# Patient Record
Sex: MALE | Race: WHITE | Employment: FULL TIME | URBAN - METROPOLITAN AREA
[De-identification: names, ages, dates, MRNs, and addresses within clinical notes are randomized per-mention and may not be internally consistent; named-entity substitution may affect disease eponyms.]

---

## 2018-01-01 ENCOUNTER — HOSPITAL ENCOUNTER (OUTPATIENT)
Dept: GENERAL RADIOLOGY | Age: 48
Discharge: HOME | End: 2018-05-14
Payer: COMMERCIAL

## 2018-01-01 DIAGNOSIS — R05.9 COUGH: ICD-10-CM

## 2018-01-01 PROCEDURE — 71046 X-RAY EXAM CHEST 2 VIEWS: CPT

## 2018-02-26 ENCOUNTER — HOSPITAL ENCOUNTER (OUTPATIENT)
Dept: CT IMAGING | Age: 48
Discharge: HOME | End: 2018-02-26
Attending: INTERNAL MEDICINE
Payer: COMMERCIAL

## 2018-02-26 DIAGNOSIS — R13.19 CONSTANT LOW-GRADE DYSPHAGIA: ICD-10-CM

## 2018-02-26 DIAGNOSIS — C16.0 ADENOCARCINOMA OF GASTROESOPHAGEAL JUNCTION (HCC): ICD-10-CM

## 2018-02-26 DIAGNOSIS — R63.4 LOSS OF WEIGHT: ICD-10-CM

## 2018-02-26 LAB — CREAT BLD-MCNC: 1.8 MG/DL (ref 0.6–1.3)

## 2018-02-26 PROCEDURE — 71260 CT THORAX DX C+: CPT

## 2018-02-26 PROCEDURE — 74177 CT ABD & PELVIS W/CONTRAST: CPT

## 2018-02-26 PROCEDURE — 82565 ASSAY OF CREATININE: CPT

## 2018-02-26 PROCEDURE — 74011636320 HC RX REV CODE- 636/320: Performed by: RADIOLOGY

## 2018-02-26 RX ADMIN — IOPAMIDOL 80 ML: 755 INJECTION, SOLUTION INTRAVENOUS at 16:28

## 2019-01-01 ENCOUNTER — HOSPITAL ENCOUNTER (INPATIENT)
Age: 49
LOS: 17 days | Discharge: HOSPICE | DRG: 329 | End: 2019-04-29
Attending: SPECIALIST | Admitting: INTERNAL MEDICINE
Payer: COMMERCIAL

## 2019-01-01 ENCOUNTER — HOME CARE VISIT (OUTPATIENT)
Dept: HOSPICE | Facility: HOSPICE | Age: 49
End: 2019-01-01
Payer: COMMERCIAL

## 2019-01-01 ENCOUNTER — ANESTHESIA (OUTPATIENT)
Dept: SURGERY | Age: 49
DRG: 329 | End: 2019-01-01
Payer: COMMERCIAL

## 2019-01-01 ENCOUNTER — APPOINTMENT (OUTPATIENT)
Dept: GENERAL RADIOLOGY | Age: 49
DRG: 329 | End: 2019-01-01
Attending: SURGERY
Payer: COMMERCIAL

## 2019-01-01 ENCOUNTER — ANESTHESIA EVENT (OUTPATIENT)
Dept: ENDOSCOPY | Age: 49
DRG: 329 | End: 2019-01-01
Payer: COMMERCIAL

## 2019-01-01 ENCOUNTER — APPOINTMENT (OUTPATIENT)
Dept: CT IMAGING | Age: 49
DRG: 329 | End: 2019-01-01
Attending: INTERNAL MEDICINE
Payer: COMMERCIAL

## 2019-01-01 ENCOUNTER — HOSPICE ADMISSION (OUTPATIENT)
Dept: HOSPICE | Facility: HOSPICE | Age: 49
End: 2019-01-01
Payer: COMMERCIAL

## 2019-01-01 ENCOUNTER — APPOINTMENT (OUTPATIENT)
Dept: GENERAL RADIOLOGY | Age: 49
DRG: 329 | End: 2019-01-01
Attending: INTERNAL MEDICINE
Payer: COMMERCIAL

## 2019-01-01 ENCOUNTER — ANESTHESIA (OUTPATIENT)
Dept: ENDOSCOPY | Age: 49
DRG: 329 | End: 2019-01-01
Payer: COMMERCIAL

## 2019-01-01 ENCOUNTER — HOSPITAL ENCOUNTER (INPATIENT)
Age: 49
LOS: 2 days | DRG: 951 | End: 2019-05-01
Attending: FAMILY MEDICINE | Admitting: FAMILY MEDICINE
Payer: OTHER MISCELLANEOUS

## 2019-01-01 ENCOUNTER — ANESTHESIA EVENT (OUTPATIENT)
Dept: SURGERY | Age: 49
DRG: 329 | End: 2019-01-01
Payer: COMMERCIAL

## 2019-01-01 ENCOUNTER — APPOINTMENT (OUTPATIENT)
Dept: ULTRASOUND IMAGING | Age: 49
DRG: 329 | End: 2019-01-01
Attending: INTERNAL MEDICINE
Payer: COMMERCIAL

## 2019-01-01 ENCOUNTER — APPOINTMENT (OUTPATIENT)
Dept: INTERVENTIONAL RADIOLOGY/VASCULAR | Age: 49
DRG: 329 | End: 2019-01-01
Attending: INTERNAL MEDICINE
Payer: COMMERCIAL

## 2019-01-01 VITALS
BODY MASS INDEX: 21.33 KG/M2 | SYSTOLIC BLOOD PRESSURE: 93 MMHG | RESPIRATION RATE: 18 BRPM | DIASTOLIC BLOOD PRESSURE: 63 MMHG | HEART RATE: 94 BPM | WEIGHT: 144 LBS | OXYGEN SATURATION: 98 % | HEIGHT: 69 IN | TEMPERATURE: 97.4 F

## 2019-01-01 VITALS
RESPIRATION RATE: 20 BRPM | OXYGEN SATURATION: 99 % | HEART RATE: 114 BPM | SYSTOLIC BLOOD PRESSURE: 144 MMHG | DIASTOLIC BLOOD PRESSURE: 91 MMHG | TEMPERATURE: 97.7 F

## 2019-01-01 DIAGNOSIS — R45.1 RESTLESSNESS AND AGITATION: ICD-10-CM

## 2019-01-01 DIAGNOSIS — Z71.89 ADVANCED CARE PLANNING/COUNSELING DISCUSSION: ICD-10-CM

## 2019-01-01 DIAGNOSIS — C18.9 ADENOCARCINOMA OF COLON (HCC): ICD-10-CM

## 2019-01-01 DIAGNOSIS — C80.0 CARCINOMATOSIS (HCC): ICD-10-CM

## 2019-01-01 DIAGNOSIS — R10.84 GENERALIZED ABDOMINAL PAIN: ICD-10-CM

## 2019-01-01 DIAGNOSIS — Z71.89 DNR (DO NOT RESUSCITATE) DISCUSSION: ICD-10-CM

## 2019-01-01 DIAGNOSIS — R06.02 SHORTNESS OF BREATH: ICD-10-CM

## 2019-01-01 DIAGNOSIS — K62.89 RECTAL OR ANAL PAIN: ICD-10-CM

## 2019-01-01 DIAGNOSIS — R06.02 SOB (SHORTNESS OF BREATH): ICD-10-CM

## 2019-01-01 DIAGNOSIS — G89.3 CANCER ASSOCIATED PAIN: ICD-10-CM

## 2019-01-01 DIAGNOSIS — Z71.89 GOALS OF CARE, COUNSELING/DISCUSSION: ICD-10-CM

## 2019-01-01 LAB
ALBUMIN SERPL-MCNC: 1.9 G/DL (ref 3.5–5)
ALBUMIN SERPL-MCNC: 2.3 G/DL (ref 3.5–5)
ALBUMIN SERPL-MCNC: 2.5 G/DL (ref 3.5–5)
ALBUMIN SERPL-MCNC: 2.8 G/DL (ref 3.5–5)
ALBUMIN/GLOB SERPL: 0.7 {RATIO} (ref 1.1–2.2)
ALBUMIN/GLOB SERPL: 0.8 {RATIO} (ref 1.1–2.2)
ALBUMIN/GLOB SERPL: 0.8 {RATIO} (ref 1.1–2.2)
ALP SERPL-CCNC: 56 U/L (ref 45–117)
ALP SERPL-CCNC: 69 U/L (ref 45–117)
ALP SERPL-CCNC: 75 U/L (ref 45–117)
ALT SERPL-CCNC: 12 U/L (ref 12–78)
ALT SERPL-CCNC: 8 U/L (ref 12–78)
ALT SERPL-CCNC: 8 U/L (ref 12–78)
ANION GAP SERPL CALC-SCNC: 12 MMOL/L (ref 5–15)
ANION GAP SERPL CALC-SCNC: 13 MMOL/L (ref 5–15)
ANION GAP SERPL CALC-SCNC: 6 MMOL/L (ref 5–15)
ANION GAP SERPL CALC-SCNC: 7 MMOL/L (ref 5–15)
ANION GAP SERPL CALC-SCNC: 8 MMOL/L (ref 5–15)
APPEARANCE FLD: ABNORMAL
APPEARANCE UR: ABNORMAL
AST SERPL-CCNC: 12 U/L (ref 15–37)
AST SERPL-CCNC: 13 U/L (ref 15–37)
AST SERPL-CCNC: 15 U/L (ref 15–37)
ATRIAL RATE: 124 BPM
BACTERIA SPEC CULT: NORMAL
BACTERIA URNS QL MICRO: ABNORMAL /HPF
BASOPHILS # BLD: 0 K/UL (ref 0–0.1)
BASOPHILS NFR BLD: 0 % (ref 0–1)
BILIRUB SERPL-MCNC: 0.3 MG/DL (ref 0.2–1)
BILIRUB SERPL-MCNC: 0.3 MG/DL (ref 0.2–1)
BILIRUB SERPL-MCNC: 0.4 MG/DL (ref 0.2–1)
BILIRUB UR QL CFM: NEGATIVE
BODY FLD TYPE: NORMAL
BUN SERPL-MCNC: 26 MG/DL (ref 6–20)
BUN SERPL-MCNC: 30 MG/DL (ref 6–20)
BUN SERPL-MCNC: 37 MG/DL (ref 6–20)
BUN SERPL-MCNC: 38 MG/DL (ref 6–20)
BUN SERPL-MCNC: 43 MG/DL (ref 6–20)
BUN SERPL-MCNC: 48 MG/DL (ref 6–20)
BUN SERPL-MCNC: 49 MG/DL (ref 6–20)
BUN SERPL-MCNC: 49 MG/DL (ref 6–20)
BUN SERPL-MCNC: 59 MG/DL (ref 6–20)
BUN/CREAT SERPL: 12 (ref 12–20)
BUN/CREAT SERPL: 13 (ref 12–20)
BUN/CREAT SERPL: 14 (ref 12–20)
BUN/CREAT SERPL: 15 (ref 12–20)
BUN/CREAT SERPL: 15 (ref 12–20)
BUN/CREAT SERPL: 18 (ref 12–20)
BUN/CREAT SERPL: 18 (ref 12–20)
BUN/CREAT SERPL: 19 (ref 12–20)
BUN/CREAT SERPL: 20 (ref 12–20)
BUN/CREAT SERPL: 20 (ref 12–20)
BUN/CREAT SERPL: 21 (ref 12–20)
BUN/CREAT SERPL: 24 (ref 12–20)
BUN/CREAT SERPL: 25 (ref 12–20)
BUN/CREAT SERPL: 26 (ref 12–20)
CALCIUM SERPL-MCNC: 8 MG/DL (ref 8.5–10.1)
CALCIUM SERPL-MCNC: 8.3 MG/DL (ref 8.5–10.1)
CALCIUM SERPL-MCNC: 8.4 MG/DL (ref 8.5–10.1)
CALCIUM SERPL-MCNC: 8.5 MG/DL (ref 8.5–10.1)
CALCIUM SERPL-MCNC: 8.7 MG/DL (ref 8.5–10.1)
CALCIUM SERPL-MCNC: 8.8 MG/DL (ref 8.5–10.1)
CALCIUM SERPL-MCNC: 8.9 MG/DL (ref 8.5–10.1)
CALCIUM SERPL-MCNC: 9 MG/DL (ref 8.5–10.1)
CALCIUM SERPL-MCNC: 9 MG/DL (ref 8.5–10.1)
CALCIUM SERPL-MCNC: 9.1 MG/DL (ref 8.5–10.1)
CALCIUM SERPL-MCNC: 9.5 MG/DL (ref 8.5–10.1)
CALCIUM SERPL-MCNC: 9.9 MG/DL (ref 8.5–10.1)
CALCULATED P AXIS, ECG09: 23 DEGREES
CALCULATED R AXIS, ECG10: 12 DEGREES
CALCULATED T AXIS, ECG11: 96 DEGREES
CHLORIDE SERPL-SCNC: 106 MMOL/L (ref 97–108)
CHLORIDE SERPL-SCNC: 109 MMOL/L (ref 97–108)
CHLORIDE SERPL-SCNC: 110 MMOL/L (ref 97–108)
CHLORIDE SERPL-SCNC: 111 MMOL/L (ref 97–108)
CHLORIDE SERPL-SCNC: 112 MMOL/L (ref 97–108)
CHLORIDE SERPL-SCNC: 113 MMOL/L (ref 97–108)
CHLORIDE SERPL-SCNC: 114 MMOL/L (ref 97–108)
CHLORIDE SERPL-SCNC: 115 MMOL/L (ref 97–108)
CO2 SERPL-SCNC: 19 MMOL/L (ref 21–32)
CO2 SERPL-SCNC: 20 MMOL/L (ref 21–32)
CO2 SERPL-SCNC: 22 MMOL/L (ref 21–32)
CO2 SERPL-SCNC: 23 MMOL/L (ref 21–32)
CO2 SERPL-SCNC: 25 MMOL/L (ref 21–32)
CO2 SERPL-SCNC: 27 MMOL/L (ref 21–32)
COLOR FLD: YELLOW
COLOR UR: ABNORMAL
CREAT SERPL-MCNC: 1.84 MG/DL (ref 0.7–1.3)
CREAT SERPL-MCNC: 1.91 MG/DL (ref 0.7–1.3)
CREAT SERPL-MCNC: 1.97 MG/DL (ref 0.7–1.3)
CREAT SERPL-MCNC: 1.97 MG/DL (ref 0.7–1.3)
CREAT SERPL-MCNC: 2.01 MG/DL (ref 0.7–1.3)
CREAT SERPL-MCNC: 2.09 MG/DL (ref 0.7–1.3)
CREAT SERPL-MCNC: 2.11 MG/DL (ref 0.7–1.3)
CREAT SERPL-MCNC: 2.31 MG/DL (ref 0.7–1.3)
CREAT SERPL-MCNC: 2.33 MG/DL (ref 0.7–1.3)
CREAT SERPL-MCNC: 2.38 MG/DL (ref 0.7–1.3)
CREAT SERPL-MCNC: 2.44 MG/DL (ref 0.7–1.3)
CREAT SERPL-MCNC: 2.44 MG/DL (ref 0.7–1.3)
CREAT SERPL-MCNC: 2.46 MG/DL (ref 0.7–1.3)
CREAT SERPL-MCNC: 2.58 MG/DL (ref 0.7–1.3)
DIAGNOSIS, 93000: NORMAL
DIFFERENTIAL METHOD BLD: ABNORMAL
EOSINOPHIL # BLD: 0 K/UL (ref 0–0.4)
EOSINOPHIL # BLD: 0.1 K/UL (ref 0–0.4)
EOSINOPHIL # BLD: 0.1 K/UL (ref 0–0.4)
EOSINOPHIL NFR BLD: 0 % (ref 0–7)
EOSINOPHIL NFR BLD: 1 % (ref 0–7)
EOSINOPHIL NFR BLD: 1 % (ref 0–7)
EPITH CASTS URNS QL MICRO: ABNORMAL /LPF
ERYTHROCYTE [DISTWIDTH] IN BLOOD BY AUTOMATED COUNT: 16.3 % (ref 11.5–14.5)
ERYTHROCYTE [DISTWIDTH] IN BLOOD BY AUTOMATED COUNT: 16.3 % (ref 11.5–14.5)
ERYTHROCYTE [DISTWIDTH] IN BLOOD BY AUTOMATED COUNT: 16.5 % (ref 11.5–14.5)
ERYTHROCYTE [DISTWIDTH] IN BLOOD BY AUTOMATED COUNT: 16.6 % (ref 11.5–14.5)
ERYTHROCYTE [DISTWIDTH] IN BLOOD BY AUTOMATED COUNT: 16.7 % (ref 11.5–14.5)
EST. AVERAGE GLUCOSE BLD GHB EST-MCNC: 140 MG/DL
GLOBULIN SER CALC-MCNC: 2.9 G/DL (ref 2–4)
GLOBULIN SER CALC-MCNC: 3.1 G/DL (ref 2–4)
GLOBULIN SER CALC-MCNC: 3.3 G/DL (ref 2–4)
GLUCOSE BLD STRIP.AUTO-MCNC: 130 MG/DL (ref 65–100)
GLUCOSE BLD STRIP.AUTO-MCNC: 133 MG/DL (ref 65–100)
GLUCOSE BLD STRIP.AUTO-MCNC: 150 MG/DL (ref 65–100)
GLUCOSE FLD-MCNC: 61 MG/DL
GLUCOSE SERPL-MCNC: 108 MG/DL (ref 65–100)
GLUCOSE SERPL-MCNC: 116 MG/DL (ref 65–100)
GLUCOSE SERPL-MCNC: 138 MG/DL (ref 65–100)
GLUCOSE SERPL-MCNC: 141 MG/DL (ref 65–100)
GLUCOSE SERPL-MCNC: 141 MG/DL (ref 65–100)
GLUCOSE SERPL-MCNC: 145 MG/DL (ref 65–100)
GLUCOSE SERPL-MCNC: 150 MG/DL (ref 65–100)
GLUCOSE SERPL-MCNC: 151 MG/DL (ref 65–100)
GLUCOSE SERPL-MCNC: 152 MG/DL (ref 65–100)
GLUCOSE SERPL-MCNC: 166 MG/DL (ref 65–100)
GLUCOSE SERPL-MCNC: 175 MG/DL (ref 65–100)
GLUCOSE SERPL-MCNC: 177 MG/DL (ref 65–100)
GLUCOSE SERPL-MCNC: 180 MG/DL (ref 65–100)
GLUCOSE SERPL-MCNC: 187 MG/DL (ref 65–100)
GLUCOSE UR STRIP.AUTO-MCNC: NEGATIVE MG/DL
GRAM STN SPEC: NORMAL
GRAM STN SPEC: NORMAL
HBA1C MFR BLD: 6.5 % (ref 4.2–6.3)
HCT VFR BLD AUTO: 29.4 % (ref 36.6–50.3)
HCT VFR BLD AUTO: 31.1 % (ref 36.6–50.3)
HCT VFR BLD AUTO: 32.7 % (ref 36.6–50.3)
HCT VFR BLD AUTO: 32.9 % (ref 36.6–50.3)
HCT VFR BLD AUTO: 33.5 % (ref 36.6–50.3)
HCT VFR BLD AUTO: 33.9 % (ref 36.6–50.3)
HCT VFR BLD AUTO: 33.9 % (ref 36.6–50.3)
HCT VFR BLD AUTO: 34.3 % (ref 36.6–50.3)
HCT VFR BLD AUTO: 34.4 % (ref 36.6–50.3)
HCT VFR BLD AUTO: 34.8 % (ref 36.6–50.3)
HCT VFR BLD AUTO: 35.8 % (ref 36.6–50.3)
HCT VFR BLD AUTO: 37.6 % (ref 36.6–50.3)
HCT VFR BLD AUTO: 39.7 % (ref 36.6–50.3)
HGB BLD-MCNC: 10.2 G/DL (ref 12.1–17)
HGB BLD-MCNC: 10.3 G/DL (ref 12.1–17)
HGB BLD-MCNC: 10.9 G/DL (ref 12.1–17)
HGB BLD-MCNC: 10.9 G/DL (ref 12.1–17)
HGB BLD-MCNC: 11.1 G/DL (ref 12.1–17)
HGB BLD-MCNC: 11.3 G/DL (ref 12.1–17)
HGB BLD-MCNC: 11.3 G/DL (ref 12.1–17)
HGB BLD-MCNC: 11.6 G/DL (ref 12.1–17)
HGB BLD-MCNC: 11.6 G/DL (ref 12.1–17)
HGB BLD-MCNC: 12.6 G/DL (ref 12.1–17)
HGB BLD-MCNC: 12.6 G/DL (ref 12.1–17)
HGB BLD-MCNC: 9.3 G/DL (ref 12.1–17)
HGB BLD-MCNC: 9.9 G/DL (ref 12.1–17)
HGB UR QL STRIP: ABNORMAL
HYALINE CASTS URNS QL MICRO: ABNORMAL /LPF (ref 0–5)
IMM GRANULOCYTES # BLD AUTO: 0 K/UL
IMM GRANULOCYTES # BLD AUTO: 0 K/UL (ref 0–0.04)
IMM GRANULOCYTES # BLD AUTO: 0.1 K/UL (ref 0–0.04)
IMM GRANULOCYTES # BLD AUTO: 0.1 K/UL (ref 0–0.04)
IMM GRANULOCYTES # BLD AUTO: 0.4 K/UL (ref 0–0.04)
IMM GRANULOCYTES NFR BLD AUTO: 0 %
IMM GRANULOCYTES NFR BLD AUTO: 0 % (ref 0–0.5)
IMM GRANULOCYTES NFR BLD AUTO: 1 % (ref 0–0.5)
IMM GRANULOCYTES NFR BLD AUTO: 1 % (ref 0–0.5)
IMM GRANULOCYTES NFR BLD AUTO: 2 % (ref 0–0.5)
KETONES UR QL STRIP.AUTO: 15 MG/DL
LDH FLD L TO P-CCNC: 2209 U/L
LEUKOCYTE ESTERASE UR QL STRIP.AUTO: ABNORMAL
LYMPHOCYTES # BLD: 0.2 K/UL (ref 0.8–3.5)
LYMPHOCYTES # BLD: 0.3 K/UL (ref 0.8–3.5)
LYMPHOCYTES # BLD: 0.3 K/UL (ref 0.8–3.5)
LYMPHOCYTES # BLD: 0.4 K/UL (ref 0.8–3.5)
LYMPHOCYTES # BLD: 0.6 K/UL (ref 0.8–3.5)
LYMPHOCYTES NFR BLD: 2 % (ref 12–49)
LYMPHOCYTES NFR BLD: 3 % (ref 12–49)
LYMPHOCYTES NFR BLD: 3 % (ref 12–49)
LYMPHOCYTES NFR BLD: 4 % (ref 12–49)
LYMPHOCYTES NFR BLD: 4 % (ref 12–49)
LYMPHOCYTES NFR FLD: 6 %
MAGNESIUM SERPL-MCNC: 1.7 MG/DL (ref 1.6–2.4)
MAGNESIUM SERPL-MCNC: 1.8 MG/DL (ref 1.6–2.4)
MCH RBC QN AUTO: 28.6 PG (ref 26–34)
MCH RBC QN AUTO: 28.9 PG (ref 26–34)
MCH RBC QN AUTO: 29.1 PG (ref 26–34)
MCH RBC QN AUTO: 29.2 PG (ref 26–34)
MCH RBC QN AUTO: 29.4 PG (ref 26–34)
MCH RBC QN AUTO: 29.4 PG (ref 26–34)
MCH RBC QN AUTO: 29.5 PG (ref 26–34)
MCH RBC QN AUTO: 29.5 PG (ref 26–34)
MCH RBC QN AUTO: 29.6 PG (ref 26–34)
MCH RBC QN AUTO: 29.8 PG (ref 26–34)
MCH RBC QN AUTO: 30 PG (ref 26–34)
MCHC RBC AUTO-ENTMCNC: 31.2 G/DL (ref 30–36.5)
MCHC RBC AUTO-ENTMCNC: 31.3 G/DL (ref 30–36.5)
MCHC RBC AUTO-ENTMCNC: 31.6 G/DL (ref 30–36.5)
MCHC RBC AUTO-ENTMCNC: 31.7 G/DL (ref 30–36.5)
MCHC RBC AUTO-ENTMCNC: 31.7 G/DL (ref 30–36.5)
MCHC RBC AUTO-ENTMCNC: 31.8 G/DL (ref 30–36.5)
MCHC RBC AUTO-ENTMCNC: 31.8 G/DL (ref 30–36.5)
MCHC RBC AUTO-ENTMCNC: 32.4 G/DL (ref 30–36.5)
MCHC RBC AUTO-ENTMCNC: 33.1 G/DL (ref 30–36.5)
MCHC RBC AUTO-ENTMCNC: 33.3 G/DL (ref 30–36.5)
MCHC RBC AUTO-ENTMCNC: 33.5 G/DL (ref 30–36.5)
MCV RBC AUTO: 88.3 FL (ref 80–99)
MCV RBC AUTO: 88.5 FL (ref 80–99)
MCV RBC AUTO: 88.5 FL (ref 80–99)
MCV RBC AUTO: 89.5 FL (ref 80–99)
MCV RBC AUTO: 89.9 FL (ref 80–99)
MCV RBC AUTO: 90.2 FL (ref 80–99)
MCV RBC AUTO: 90.5 FL (ref 80–99)
MCV RBC AUTO: 91.7 FL (ref 80–99)
MCV RBC AUTO: 91.7 FL (ref 80–99)
MCV RBC AUTO: 92 FL (ref 80–99)
MCV RBC AUTO: 92.4 FL (ref 80–99)
MCV RBC AUTO: 93 FL (ref 80–99)
MCV RBC AUTO: 94.5 FL (ref 80–99)
MESOTHL CELL NFR FLD: 34 %
MONOCYTES # BLD: 0.5 K/UL (ref 0–1)
MONOCYTES # BLD: 0.8 K/UL (ref 0–1)
MONOCYTES # BLD: 0.9 K/UL (ref 0–1)
MONOCYTES # BLD: 1 K/UL (ref 0–1)
MONOCYTES # BLD: 1.5 K/UL (ref 0–1)
MONOCYTES NFR BLD: 10 % (ref 5–13)
MONOCYTES NFR BLD: 10 % (ref 5–13)
MONOCYTES NFR BLD: 6 % (ref 5–13)
MONOCYTES NFR BLD: 7 % (ref 5–13)
MONOCYTES NFR BLD: 9 % (ref 5–13)
MONOS+MACROS NFR FLD: 44 %
NEUTROPHILS NFR FLD: 12 %
NEUTS BAND NFR BLD MANUAL: 2 % (ref 0–6)
NEUTS SEG # BLD: 10.1 K/UL (ref 1.8–8)
NEUTS SEG # BLD: 18.4 K/UL (ref 1.8–8)
NEUTS SEG # BLD: 6.9 K/UL (ref 1.8–8)
NEUTS SEG # BLD: 7.4 K/UL (ref 1.8–8)
NEUTS SEG # BLD: 8 K/UL (ref 1.8–8)
NEUTS SEG NFR BLD: 84 % (ref 32–75)
NEUTS SEG NFR BLD: 84 % (ref 32–75)
NEUTS SEG NFR BLD: 88 % (ref 32–75)
NEUTS SEG NFR BLD: 88 % (ref 32–75)
NEUTS SEG NFR BLD: 90 % (ref 32–75)
NITRITE UR QL STRIP.AUTO: POSITIVE
NRBC # BLD: 0 K/UL (ref 0–0.01)
NRBC BLD-RTO: 0 PER 100 WBC
NUC CELL # FLD: 2001 /CU MM
OTHER CELL,FOTHC: 4 %
PATH REV BLD -IMP: ABNORMAL
PH FLD: 7.2 [PH]
PH UR STRIP: 5.5 [PH] (ref 5–8)
PHOSPHATE SERPL-MCNC: 2.2 MG/DL (ref 2.6–4.7)
PHOSPHATE SERPL-MCNC: 2.5 MG/DL (ref 2.6–4.7)
PHOSPHATE SERPL-MCNC: 2.6 MG/DL (ref 2.6–4.7)
PHOSPHATE SERPL-MCNC: 3 MG/DL (ref 2.6–4.7)
PHOSPHATE SERPL-MCNC: 3.2 MG/DL (ref 2.6–4.7)
PLATELET # BLD AUTO: 182 K/UL (ref 150–400)
PLATELET # BLD AUTO: 189 K/UL (ref 150–400)
PLATELET # BLD AUTO: 193 K/UL (ref 150–400)
PLATELET # BLD AUTO: 212 K/UL (ref 150–400)
PLATELET # BLD AUTO: 224 K/UL (ref 150–400)
PLATELET # BLD AUTO: 227 K/UL (ref 150–400)
PLATELET # BLD AUTO: 231 K/UL (ref 150–400)
PLATELET # BLD AUTO: 233 K/UL (ref 150–400)
PLATELET # BLD AUTO: 243 K/UL (ref 150–400)
PLATELET # BLD AUTO: 273 K/UL (ref 150–400)
PLATELET # BLD AUTO: 274 K/UL (ref 150–400)
PLATELET # BLD AUTO: 302 K/UL (ref 150–400)
PLATELET # BLD AUTO: 317 K/UL (ref 150–400)
PMV BLD AUTO: 10 FL (ref 8.9–12.9)
PMV BLD AUTO: 10.1 FL (ref 8.9–12.9)
PMV BLD AUTO: 8.8 FL (ref 8.9–12.9)
PMV BLD AUTO: 8.9 FL (ref 8.9–12.9)
PMV BLD AUTO: 9 FL (ref 8.9–12.9)
PMV BLD AUTO: 9.1 FL (ref 8.9–12.9)
PMV BLD AUTO: 9.2 FL (ref 8.9–12.9)
PMV BLD AUTO: 9.3 FL (ref 8.9–12.9)
PMV BLD AUTO: 9.5 FL (ref 8.9–12.9)
PMV BLD AUTO: 9.5 FL (ref 8.9–12.9)
PMV BLD AUTO: 9.6 FL (ref 8.9–12.9)
PMV BLD AUTO: 9.7 FL (ref 8.9–12.9)
PMV BLD AUTO: 9.7 FL (ref 8.9–12.9)
POTASSIUM SERPL-SCNC: 2.8 MMOL/L (ref 3.5–5.1)
POTASSIUM SERPL-SCNC: 3.5 MMOL/L (ref 3.5–5.1)
POTASSIUM SERPL-SCNC: 3.8 MMOL/L (ref 3.5–5.1)
POTASSIUM SERPL-SCNC: 4 MMOL/L (ref 3.5–5.1)
POTASSIUM SERPL-SCNC: 4.1 MMOL/L (ref 3.5–5.1)
POTASSIUM SERPL-SCNC: 4.1 MMOL/L (ref 3.5–5.1)
POTASSIUM SERPL-SCNC: 4.2 MMOL/L (ref 3.5–5.1)
POTASSIUM SERPL-SCNC: 4.2 MMOL/L (ref 3.5–5.1)
POTASSIUM SERPL-SCNC: 4.3 MMOL/L (ref 3.5–5.1)
POTASSIUM SERPL-SCNC: 4.4 MMOL/L (ref 3.5–5.1)
POTASSIUM SERPL-SCNC: 4.7 MMOL/L (ref 3.5–5.1)
POTASSIUM SERPL-SCNC: 5.2 MMOL/L (ref 3.5–5.1)
POTASSIUM SERPL-SCNC: 5.3 MMOL/L (ref 3.5–5.1)
POTASSIUM SERPL-SCNC: 5.4 MMOL/L (ref 3.5–5.1)
PROT FLD-MCNC: 4.5 G/DL
PROT SERPL-MCNC: 4.8 G/DL (ref 6.4–8.2)
PROT SERPL-MCNC: 5.6 G/DL (ref 6.4–8.2)
PROT SERPL-MCNC: 6.1 G/DL (ref 6.4–8.2)
PROT UR STRIP-MCNC: 300 MG/DL
Q-T INTERVAL, ECG07: 306 MS
QRS DURATION, ECG06: 74 MS
QTC CALCULATION (BEZET), ECG08: 439 MS
RBC # BLD AUTO: 3.16 M/UL (ref 4.1–5.7)
RBC # BLD AUTO: 3.46 M/UL (ref 4.1–5.7)
RBC # BLD AUTO: 3.46 M/UL (ref 4.1–5.7)
RBC # BLD AUTO: 3.56 M/UL (ref 4.1–5.7)
RBC # BLD AUTO: 3.7 M/UL (ref 4.1–5.7)
RBC # BLD AUTO: 3.74 M/UL (ref 4.1–5.7)
RBC # BLD AUTO: 3.74 M/UL (ref 4.1–5.7)
RBC # BLD AUTO: 3.83 M/UL (ref 4.1–5.7)
RBC # BLD AUTO: 3.84 M/UL (ref 4.1–5.7)
RBC # BLD AUTO: 3.89 M/UL (ref 4.1–5.7)
RBC # BLD AUTO: 3.97 M/UL (ref 4.1–5.7)
RBC # BLD AUTO: 4.25 M/UL (ref 4.1–5.7)
RBC # BLD AUTO: 4.33 M/UL (ref 4.1–5.7)
RBC # FLD: >100 /CU MM
RBC #/AREA URNS HPF: >100 /HPF (ref 0–5)
RBC MORPH BLD: ABNORMAL
RBC MORPH BLD: ABNORMAL
SERVICE CMNT-IMP: ABNORMAL
SERVICE CMNT-IMP: NORMAL
SODIUM SERPL-SCNC: 137 MMOL/L (ref 136–145)
SODIUM SERPL-SCNC: 138 MMOL/L (ref 136–145)
SODIUM SERPL-SCNC: 138 MMOL/L (ref 136–145)
SODIUM SERPL-SCNC: 139 MMOL/L (ref 136–145)
SODIUM SERPL-SCNC: 139 MMOL/L (ref 136–145)
SODIUM SERPL-SCNC: 140 MMOL/L (ref 136–145)
SODIUM SERPL-SCNC: 140 MMOL/L (ref 136–145)
SODIUM SERPL-SCNC: 141 MMOL/L (ref 136–145)
SODIUM SERPL-SCNC: 142 MMOL/L (ref 136–145)
SODIUM SERPL-SCNC: 143 MMOL/L (ref 136–145)
SODIUM SERPL-SCNC: 144 MMOL/L (ref 136–145)
SODIUM SERPL-SCNC: 146 MMOL/L (ref 136–145)
SP GR UR REFRACTOMETRY: 1.02 (ref 1–1.03)
SPECIMEN SOURCE FLD: ABNORMAL
SPECIMEN SOURCE FLD: NORMAL
UR CULT HOLD, URHOLD: NORMAL
UROBILINOGEN UR QL STRIP.AUTO: 1 EU/DL (ref 0.2–1)
VENTRICULAR RATE, ECG03: 124 BPM
WBC # BLD AUTO: 10.8 K/UL (ref 4.1–11.1)
WBC # BLD AUTO: 10.9 K/UL (ref 4.1–11.1)
WBC # BLD AUTO: 11.5 K/UL (ref 4.1–11.1)
WBC # BLD AUTO: 11.6 K/UL (ref 4.1–11.1)
WBC # BLD AUTO: 12.8 K/UL (ref 4.1–11.1)
WBC # BLD AUTO: 20.9 K/UL (ref 4.1–11.1)
WBC # BLD AUTO: 7.6 K/UL (ref 4.1–11.1)
WBC # BLD AUTO: 7.9 K/UL (ref 4.1–11.1)
WBC # BLD AUTO: 8 K/UL (ref 4.1–11.1)
WBC # BLD AUTO: 8.2 K/UL (ref 4.1–11.1)
WBC # BLD AUTO: 8.7 K/UL (ref 4.1–11.1)
WBC # BLD AUTO: 8.8 K/UL (ref 4.1–11.1)
WBC # BLD AUTO: 8.8 K/UL (ref 4.1–11.1)
WBC URNS QL MICRO: ABNORMAL /HPF (ref 0–4)

## 2019-01-01 PROCEDURE — 65270000029 HC RM PRIVATE

## 2019-01-01 PROCEDURE — 74011636320 HC RX REV CODE- 636/320

## 2019-01-01 PROCEDURE — 94760 N-INVAS EAR/PLS OXIMETRY 1: CPT

## 2019-01-01 PROCEDURE — 74011000250 HC RX REV CODE- 250: Performed by: INTERNAL MEDICINE

## 2019-01-01 PROCEDURE — 74011250637 HC RX REV CODE- 250/637: Performed by: SURGERY

## 2019-01-01 PROCEDURE — 85027 COMPLETE CBC AUTOMATED: CPT

## 2019-01-01 PROCEDURE — 74011250636 HC RX REV CODE- 250/636: Performed by: FAMILY MEDICINE

## 2019-01-01 PROCEDURE — G0299 HHS/HOSPICE OF RN EA 15 MIN: HCPCS

## 2019-01-01 PROCEDURE — 88108 CYTOPATH CONCENTRATE TECH: CPT

## 2019-01-01 PROCEDURE — 85025 COMPLETE CBC W/AUTO DIFF WBC: CPT

## 2019-01-01 PROCEDURE — 77030026438 HC STYL ET INTUB CARD -A: Performed by: ANESTHESIOLOGY

## 2019-01-01 PROCEDURE — 99152 MOD SED SAME PHYS/QHP 5/>YRS: CPT

## 2019-01-01 PROCEDURE — 94640 AIRWAY INHALATION TREATMENT: CPT

## 2019-01-01 PROCEDURE — 76770 US EXAM ABDO BACK WALL COMP: CPT

## 2019-01-01 PROCEDURE — 77030036554

## 2019-01-01 PROCEDURE — 84157 ASSAY OF PROTEIN OTHER: CPT

## 2019-01-01 PROCEDURE — C1713 ANCHOR/SCREW BN/BN,TIS/BN: HCPCS

## 2019-01-01 PROCEDURE — 83735 ASSAY OF MAGNESIUM: CPT

## 2019-01-01 PROCEDURE — 87015 SPECIMEN INFECT AGNT CONCNTJ: CPT

## 2019-01-01 PROCEDURE — 76942 ECHO GUIDE FOR BIOPSY: CPT

## 2019-01-01 PROCEDURE — 74011000258 HC RX REV CODE- 258: Performed by: INTERNAL MEDICINE

## 2019-01-01 PROCEDURE — 74011250636 HC RX REV CODE- 250/636: Performed by: INTERNAL MEDICINE

## 2019-01-01 PROCEDURE — 80069 RENAL FUNCTION PANEL: CPT

## 2019-01-01 PROCEDURE — 77030020782 HC GWN BAIR PAWS FLX 3M -B

## 2019-01-01 PROCEDURE — 77030018719 HC DRSG PTCH ANTIMIC J&J -A

## 2019-01-01 PROCEDURE — 80053 COMPREHEN METABOLIC PANEL: CPT

## 2019-01-01 PROCEDURE — 36415 COLL VENOUS BLD VENIPUNCTURE: CPT

## 2019-01-01 PROCEDURE — 77030011640 HC PAD GRND REM COVD -A: Performed by: SURGERY

## 2019-01-01 PROCEDURE — 74011250637 HC RX REV CODE- 250/637: Performed by: FAMILY MEDICINE

## 2019-01-01 PROCEDURE — 77030034850: Performed by: SURGERY

## 2019-01-01 PROCEDURE — 77030008603 HC TRCR ENDOSC EPATH J&J -C: Performed by: SURGERY

## 2019-01-01 PROCEDURE — 77030010522

## 2019-01-01 PROCEDURE — 0656 HSPC GENERAL INPATIENT

## 2019-01-01 PROCEDURE — 74011250636 HC RX REV CODE- 250/636: Performed by: SURGERY

## 2019-01-01 PROCEDURE — 74011250637 HC RX REV CODE- 250/637: Performed by: INTERNAL MEDICINE

## 2019-01-01 PROCEDURE — P9045 ALBUMIN (HUMAN), 5%, 250 ML: HCPCS

## 2019-01-01 PROCEDURE — 80048 BASIC METABOLIC PNL TOTAL CA: CPT

## 2019-01-01 PROCEDURE — 94664 DEMO&/EVAL PT USE INHALER: CPT

## 2019-01-01 PROCEDURE — 77030011641 HC PASTE OST ADH BMS -A

## 2019-01-01 PROCEDURE — 77030010541

## 2019-01-01 PROCEDURE — 88305 TISSUE EXAM BY PATHOLOGIST: CPT

## 2019-01-01 PROCEDURE — 0DP6XUZ REMOVAL OF FEEDING DEVICE FROM STOMACH, EXTERNAL APPROACH: ICD-10-PCS | Performed by: SPECIALIST

## 2019-01-01 PROCEDURE — 83986 ASSAY PH BODY FLUID NOS: CPT

## 2019-01-01 PROCEDURE — 77030002966 HC SUT PDS J&J -A: Performed by: SURGERY

## 2019-01-01 PROCEDURE — 74011000250 HC RX REV CODE- 250: Performed by: SURGERY

## 2019-01-01 PROCEDURE — 76060000031 HC ANESTHESIA FIRST 0.5 HR: Performed by: SPECIALIST

## 2019-01-01 PROCEDURE — 89050 BODY FLUID CELL COUNT: CPT

## 2019-01-01 PROCEDURE — 84100 ASSAY OF PHOSPHORUS: CPT

## 2019-01-01 PROCEDURE — 77002 NEEDLE LOCALIZATION BY XRAY: CPT

## 2019-01-01 PROCEDURE — 74011250636 HC RX REV CODE- 250/636

## 2019-01-01 PROCEDURE — 88341 IMHCHEM/IMCYTCHM EA ADD ANTB: CPT

## 2019-01-01 PROCEDURE — 71045 X-RAY EXAM CHEST 1 VIEW: CPT

## 2019-01-01 PROCEDURE — 74011000250 HC RX REV CODE- 250: Performed by: FAMILY MEDICINE

## 2019-01-01 PROCEDURE — 77030003029 HC SUT VCRL J&J -B: Performed by: SURGERY

## 2019-01-01 PROCEDURE — 0DH63UZ INSERTION OF FEEDING DEVICE INTO STOMACH, PERCUTANEOUS APPROACH: ICD-10-PCS | Performed by: SPECIALIST

## 2019-01-01 PROCEDURE — 77030009403 HC ELECTRD ENDO MEGA -B: Performed by: SURGERY

## 2019-01-01 PROCEDURE — 74011000258 HC RX REV CODE- 258: Performed by: SURGERY

## 2019-01-01 PROCEDURE — 77030029684 HC NEB SM VOL KT MONA -A

## 2019-01-01 PROCEDURE — 0DBQ8ZX EXCISION OF ANUS, VIA NATURAL OR ARTIFICIAL OPENING ENDOSCOPIC, DIAGNOSTIC: ICD-10-PCS | Performed by: SPECIALIST

## 2019-01-01 PROCEDURE — 87205 SMEAR GRAM STAIN: CPT

## 2019-01-01 PROCEDURE — C9290 INJ, BUPIVACAINE LIPOSOME: HCPCS | Performed by: SURGERY

## 2019-01-01 PROCEDURE — 76040000007: Performed by: INTERNAL MEDICINE

## 2019-01-01 PROCEDURE — 71250 CT THORAX DX C-: CPT

## 2019-01-01 PROCEDURE — 74250 X-RAY XM SM INT 1CNTRST STD: CPT

## 2019-01-01 PROCEDURE — 77030032490 HC SLV COMPR SCD KNE COVD -B

## 2019-01-01 PROCEDURE — 74018 RADEX ABDOMEN 1 VIEW: CPT

## 2019-01-01 PROCEDURE — 74011250636 HC RX REV CODE- 250/636: Performed by: ANESTHESIOLOGY

## 2019-01-01 PROCEDURE — 77030009426 HC FCPS BIOP ENDOSC BSC -B: Performed by: SPECIALIST

## 2019-01-01 PROCEDURE — 81001 URINALYSIS AUTO W/SCOPE: CPT

## 2019-01-01 PROCEDURE — 82962 GLUCOSE BLOOD TEST: CPT

## 2019-01-01 PROCEDURE — 77030018870 HC TY PARCNT BD -B: Performed by: INTERNAL MEDICINE

## 2019-01-01 PROCEDURE — 88112 CYTOPATH CELL ENHANCE TECH: CPT

## 2019-01-01 PROCEDURE — 88309 TISSUE EXAM BY PATHOLOGIST: CPT

## 2019-01-01 PROCEDURE — 0W993ZZ DRAINAGE OF RIGHT PLEURAL CAVITY, PERCUTANEOUS APPROACH: ICD-10-PCS | Performed by: INTERNAL MEDICINE

## 2019-01-01 PROCEDURE — 77030003560 HC NDL HUBR BARD -A

## 2019-01-01 PROCEDURE — 77010033678 HC OXYGEN DAILY

## 2019-01-01 PROCEDURE — 74011000250 HC RX REV CODE- 250

## 2019-01-01 PROCEDURE — 88342 IMHCHEM/IMCYTCHM 1ST ANTB: CPT

## 2019-01-01 PROCEDURE — 76210000016 HC OR PH I REC 1 TO 1.5 HR: Performed by: SURGERY

## 2019-01-01 PROCEDURE — C1729 CATH, DRAINAGE: HCPCS

## 2019-01-01 PROCEDURE — 74011250636 HC RX REV CODE- 250/636: Performed by: SPECIALIST

## 2019-01-01 PROCEDURE — 76060000034 HC ANESTHESIA 1.5 TO 2 HR: Performed by: SURGERY

## 2019-01-01 PROCEDURE — 93005 ELECTROCARDIOGRAM TRACING: CPT

## 2019-01-01 PROCEDURE — 76040000019: Performed by: SPECIALIST

## 2019-01-01 PROCEDURE — C1894 INTRO/SHEATH, NON-LASER: HCPCS

## 2019-01-01 PROCEDURE — 77030008602 HC TRCR ENDOSC EPATH J&J -B: Performed by: SURGERY

## 2019-01-01 PROCEDURE — 77030002933 HC SUT MCRYL J&J -A: Performed by: SURGERY

## 2019-01-01 PROCEDURE — 83615 LACTATE (LD) (LDH) ENZYME: CPT

## 2019-01-01 PROCEDURE — 76010000153 HC OR TIME 1.5 TO 2 HR: Performed by: SURGERY

## 2019-01-01 PROCEDURE — 3336500001 HSPC ELECTION

## 2019-01-01 PROCEDURE — 76450000000

## 2019-01-01 PROCEDURE — 77030018809 HC RETRCTR ALXSO DISP AMR -B: Performed by: SURGERY

## 2019-01-01 PROCEDURE — 77030005537 HC CATH URETH BARD -A: Performed by: SURGERY

## 2019-01-01 PROCEDURE — 77030039266 HC ADH SKN EXOFIN S2SG -A: Performed by: SURGERY

## 2019-01-01 PROCEDURE — 77030020263 HC SOL INJ SOD CL0.9% LFCR 1000ML: Performed by: SURGERY

## 2019-01-01 PROCEDURE — 77030002986 HC SUT PROL J&J -A

## 2019-01-01 PROCEDURE — 77030015927 HC DEV TISS SEAL SURX -F: Performed by: SURGERY

## 2019-01-01 PROCEDURE — 0D1B0Z4 BYPASS ILEUM TO CUTANEOUS, OPEN APPROACH: ICD-10-PCS | Performed by: SURGERY

## 2019-01-01 PROCEDURE — 83036 HEMOGLOBIN GLYCOSYLATED A1C: CPT

## 2019-01-01 PROCEDURE — 77030039266 HC ADH SKN EXOFIN S2SG -A

## 2019-01-01 PROCEDURE — 74011636320 HC RX REV CODE- 636/320: Performed by: INTERNAL MEDICINE

## 2019-01-01 PROCEDURE — 82945 GLUCOSE OTHER FLUID: CPT

## 2019-01-01 PROCEDURE — 77030010541: Performed by: SURGERY

## 2019-01-01 PROCEDURE — 77030020747 HC TU INSUF ENDOSC TELE -A: Performed by: SURGERY

## 2019-01-01 PROCEDURE — 88313 SPECIAL STAINS GROUP 2: CPT

## 2019-01-01 PROCEDURE — 74011250636 HC RX REV CODE- 250/636: Performed by: RADIOLOGY

## 2019-01-01 RX ORDER — ACETAMINOPHEN 325 MG/1
650 TABLET ORAL
Status: DISCONTINUED | OUTPATIENT
Start: 2019-01-01 | End: 2019-01-01

## 2019-01-01 RX ORDER — PROCHLORPERAZINE EDISYLATE 5 MG/ML
5 INJECTION INTRAMUSCULAR; INTRAVENOUS
Status: DISCONTINUED | OUTPATIENT
Start: 2019-01-01 | End: 2019-01-01 | Stop reason: HOSPADM

## 2019-01-01 RX ORDER — SODIUM CHLORIDE, SODIUM LACTATE, POTASSIUM CHLORIDE, CALCIUM CHLORIDE 600; 310; 30; 20 MG/100ML; MG/100ML; MG/100ML; MG/100ML
125 INJECTION, SOLUTION INTRAVENOUS CONTINUOUS
Status: DISCONTINUED | OUTPATIENT
Start: 2019-01-01 | End: 2019-01-01 | Stop reason: HOSPADM

## 2019-01-01 RX ORDER — ONDANSETRON 2 MG/ML
4 INJECTION INTRAMUSCULAR; INTRAVENOUS AS NEEDED
Status: DISCONTINUED | OUTPATIENT
Start: 2019-01-01 | End: 2019-01-01 | Stop reason: HOSPADM

## 2019-01-01 RX ORDER — SODIUM CHLORIDE 9 MG/ML
50 INJECTION, SOLUTION INTRAVENOUS CONTINUOUS
Status: DISPENSED | OUTPATIENT
Start: 2019-01-01 | End: 2019-01-01

## 2019-01-01 RX ORDER — LORAZEPAM 2 MG/ML
1 INJECTION INTRAMUSCULAR EVERY 4 HOURS
Status: DISCONTINUED | OUTPATIENT
Start: 2019-01-01 | End: 2019-01-01

## 2019-01-01 RX ORDER — GLYCOPYRROLATE 0.2 MG/ML
INJECTION INTRAMUSCULAR; INTRAVENOUS AS NEEDED
Status: DISCONTINUED | OUTPATIENT
Start: 2019-01-01 | End: 2019-01-01 | Stop reason: HOSPADM

## 2019-01-01 RX ORDER — HALOPERIDOL 5 MG/ML
1 INJECTION INTRAMUSCULAR
Status: DISCONTINUED | OUTPATIENT
Start: 2019-01-01 | End: 2019-01-01

## 2019-01-01 RX ORDER — LORAZEPAM 2 MG/ML
1 INJECTION INTRAMUSCULAR
Status: DISCONTINUED | OUTPATIENT
Start: 2019-01-01 | End: 2019-01-01 | Stop reason: HOSPADM

## 2019-01-01 RX ORDER — LIDOCAINE HYDROCHLORIDE 10 MG/ML
10-30 INJECTION INFILTRATION; PERINEURAL
Status: DISCONTINUED | OUTPATIENT
Start: 2019-01-01 | End: 2019-01-01 | Stop reason: HOSPADM

## 2019-01-01 RX ORDER — LIDOCAINE HYDROCHLORIDE 10 MG/ML
0.1 INJECTION, SOLUTION EPIDURAL; INFILTRATION; INTRACAUDAL; PERINEURAL AS NEEDED
Status: DISCONTINUED | OUTPATIENT
Start: 2019-01-01 | End: 2019-01-01 | Stop reason: HOSPADM

## 2019-01-01 RX ORDER — ALBUMIN HUMAN 50 G/1000ML
SOLUTION INTRAVENOUS AS NEEDED
Status: DISCONTINUED | OUTPATIENT
Start: 2019-01-01 | End: 2019-01-01 | Stop reason: HOSPADM

## 2019-01-01 RX ORDER — SODIUM CHLORIDE 9 MG/ML
100 INJECTION, SOLUTION INTRAVENOUS CONTINUOUS
Status: DISCONTINUED | OUTPATIENT
Start: 2019-01-01 | End: 2019-01-01

## 2019-01-01 RX ORDER — POLYETHYLENE GLYCOL 3350 17 G/17G
17 POWDER, FOR SOLUTION ORAL DAILY
Status: DISCONTINUED | OUTPATIENT
Start: 2019-01-01 | End: 2019-01-01

## 2019-01-01 RX ORDER — LEVALBUTEROL INHALATION SOLUTION 1.25 MG/3ML
1.25 SOLUTION RESPIRATORY (INHALATION)
Status: DISCONTINUED | OUTPATIENT
Start: 2019-01-01 | End: 2019-01-01 | Stop reason: HOSPADM

## 2019-01-01 RX ORDER — POTASSIUM CHLORIDE 1.5 G/1.77G
40 POWDER, FOR SOLUTION ORAL
Status: COMPLETED | OUTPATIENT
Start: 2019-01-01 | End: 2019-01-01

## 2019-01-01 RX ORDER — DIPHENHYDRAMINE HYDROCHLORIDE 50 MG/ML
12.5 INJECTION, SOLUTION INTRAMUSCULAR; INTRAVENOUS AS NEEDED
Status: DISCONTINUED | OUTPATIENT
Start: 2019-01-01 | End: 2019-01-01 | Stop reason: HOSPADM

## 2019-01-01 RX ORDER — DEXTROSE, SODIUM CHLORIDE, AND POTASSIUM CHLORIDE 5; .45; .15 G/100ML; G/100ML; G/100ML
75 INJECTION INTRAVENOUS CONTINUOUS
Status: DISCONTINUED | OUTPATIENT
Start: 2019-01-01 | End: 2019-01-01

## 2019-01-01 RX ORDER — IPRATROPIUM BROMIDE 0.5 MG/2.5ML
0.5 SOLUTION RESPIRATORY (INHALATION)
Status: DISCONTINUED | OUTPATIENT
Start: 2019-01-01 | End: 2019-01-01

## 2019-01-01 RX ORDER — FLUMAZENIL 0.1 MG/ML
0.2 INJECTION INTRAVENOUS
Status: ACTIVE | OUTPATIENT
Start: 2019-01-01 | End: 2019-01-01

## 2019-01-01 RX ORDER — SODIUM CHLORIDE 0.9 % (FLUSH) 0.9 %
5-40 SYRINGE (ML) INJECTION AS NEEDED
Status: DISCONTINUED | OUTPATIENT
Start: 2019-01-01 | End: 2019-01-01 | Stop reason: HOSPADM

## 2019-01-01 RX ORDER — MIDAZOLAM HYDROCHLORIDE 1 MG/ML
.25-5 INJECTION, SOLUTION INTRAMUSCULAR; INTRAVENOUS AS NEEDED
Status: DISCONTINUED | OUTPATIENT
Start: 2019-01-01 | End: 2019-01-01 | Stop reason: HOSPADM

## 2019-01-01 RX ORDER — ONDANSETRON 2 MG/ML
INJECTION INTRAMUSCULAR; INTRAVENOUS AS NEEDED
Status: DISCONTINUED | OUTPATIENT
Start: 2019-01-01 | End: 2019-01-01 | Stop reason: HOSPADM

## 2019-01-01 RX ORDER — HALOPERIDOL 5 MG/ML
1 INJECTION INTRAMUSCULAR
Status: DISCONTINUED | OUTPATIENT
Start: 2019-01-01 | End: 2019-01-01 | Stop reason: HOSPADM

## 2019-01-01 RX ORDER — GLYCOPYRROLATE 0.2 MG/ML
0.2 INJECTION INTRAMUSCULAR; INTRAVENOUS
Status: DISCONTINUED | OUTPATIENT
Start: 2019-01-01 | End: 2019-01-01 | Stop reason: HOSPADM

## 2019-01-01 RX ORDER — LEVALBUTEROL INHALATION SOLUTION 1.25 MG/3ML
1.25 SOLUTION RESPIRATORY (INHALATION)
Status: DISCONTINUED | OUTPATIENT
Start: 2019-01-01 | End: 2019-01-01

## 2019-01-01 RX ORDER — MIDAZOLAM HYDROCHLORIDE 1 MG/ML
.5-1 INJECTION, SOLUTION INTRAMUSCULAR; INTRAVENOUS
Status: DISCONTINUED | OUTPATIENT
Start: 2019-01-01 | End: 2019-01-01 | Stop reason: HOSPADM

## 2019-01-01 RX ORDER — FENTANYL CITRATE 50 UG/ML
25 INJECTION, SOLUTION INTRAMUSCULAR; INTRAVENOUS AS NEEDED
Status: DISCONTINUED | OUTPATIENT
Start: 2019-01-01 | End: 2019-01-01 | Stop reason: HOSPADM

## 2019-01-01 RX ORDER — POTASSIUM CHLORIDE 750 MG/1
10 TABLET, FILM COATED, EXTENDED RELEASE ORAL
Status: COMPLETED | OUTPATIENT
Start: 2019-01-01 | End: 2019-01-01

## 2019-01-01 RX ORDER — DIPHENHYDRAMINE HYDROCHLORIDE 50 MG/ML
50 INJECTION, SOLUTION INTRAMUSCULAR; INTRAVENOUS ONCE
Status: COMPLETED | OUTPATIENT
Start: 2019-01-01 | End: 2019-01-01

## 2019-01-01 RX ORDER — DEXTROSE MONOHYDRATE AND SODIUM CHLORIDE 5; .45 G/100ML; G/100ML
100 INJECTION, SOLUTION INTRAVENOUS CONTINUOUS
Status: DISCONTINUED | OUTPATIENT
Start: 2019-01-01 | End: 2019-01-01 | Stop reason: HOSPADM

## 2019-01-01 RX ORDER — ALBUTEROL SULFATE 0.83 MG/ML
2.5 SOLUTION RESPIRATORY (INHALATION) AS NEEDED
Status: DISCONTINUED | OUTPATIENT
Start: 2019-01-01 | End: 2019-01-01 | Stop reason: HOSPADM

## 2019-01-01 RX ORDER — DEXAMETHASONE SODIUM PHOSPHATE 4 MG/ML
4 INJECTION, SOLUTION INTRA-ARTICULAR; INTRALESIONAL; INTRAMUSCULAR; INTRAVENOUS; SOFT TISSUE EVERY 8 HOURS
Status: DISCONTINUED | OUTPATIENT
Start: 2019-01-01 | End: 2019-01-01

## 2019-01-01 RX ORDER — POTASSIUM CHLORIDE 7.45 MG/ML
10 INJECTION INTRAVENOUS
Status: DISPENSED | OUTPATIENT
Start: 2019-01-01 | End: 2019-01-01

## 2019-01-01 RX ORDER — PROPOFOL 10 MG/ML
INJECTION, EMULSION INTRAVENOUS AS NEEDED
Status: DISCONTINUED | OUTPATIENT
Start: 2019-01-01 | End: 2019-01-01 | Stop reason: HOSPADM

## 2019-01-01 RX ORDER — PROCHLORPERAZINE EDISYLATE 5 MG/ML
10 INJECTION INTRAMUSCULAR; INTRAVENOUS
Status: DISCONTINUED | OUTPATIENT
Start: 2019-01-01 | End: 2019-01-01 | Stop reason: HOSPADM

## 2019-01-01 RX ORDER — MIDAZOLAM HYDROCHLORIDE 1 MG/ML
INJECTION, SOLUTION INTRAMUSCULAR; INTRAVENOUS AS NEEDED
Status: DISCONTINUED | OUTPATIENT
Start: 2019-01-01 | End: 2019-01-01 | Stop reason: HOSPADM

## 2019-01-01 RX ORDER — HALOPERIDOL 5 MG/ML
2 INJECTION INTRAMUSCULAR
Status: DISCONTINUED | OUTPATIENT
Start: 2019-01-01 | End: 2019-01-01 | Stop reason: HOSPADM

## 2019-01-01 RX ORDER — SODIUM CHLORIDE 0.9 % (FLUSH) 0.9 %
5-40 SYRINGE (ML) INJECTION EVERY 8 HOURS
Status: DISCONTINUED | OUTPATIENT
Start: 2019-01-01 | End: 2019-01-01 | Stop reason: HOSPADM

## 2019-01-01 RX ORDER — SERTRALINE HYDROCHLORIDE 50 MG/1
50 TABLET, FILM COATED ORAL DAILY
Status: DISCONTINUED | OUTPATIENT
Start: 2019-01-01 | End: 2019-01-01

## 2019-01-01 RX ORDER — LEVOFLOXACIN 5 MG/ML
750 INJECTION, SOLUTION INTRAVENOUS EVERY 24 HOURS
Status: DISCONTINUED | OUTPATIENT
Start: 2019-01-01 | End: 2019-01-01

## 2019-01-01 RX ORDER — NALOXONE HYDROCHLORIDE 0.4 MG/ML
0.4 INJECTION, SOLUTION INTRAMUSCULAR; INTRAVENOUS; SUBCUTANEOUS
Status: ACTIVE | OUTPATIENT
Start: 2019-01-01 | End: 2019-01-01

## 2019-01-01 RX ORDER — NEOSTIGMINE METHYLSULFATE 1 MG/ML
INJECTION INTRAVENOUS AS NEEDED
Status: DISCONTINUED | OUTPATIENT
Start: 2019-01-01 | End: 2019-01-01 | Stop reason: HOSPADM

## 2019-01-01 RX ORDER — DEXAMETHASONE SODIUM PHOSPHATE 4 MG/ML
INJECTION, SOLUTION INTRA-ARTICULAR; INTRALESIONAL; INTRAMUSCULAR; INTRAVENOUS; SOFT TISSUE AS NEEDED
Status: DISCONTINUED | OUTPATIENT
Start: 2019-01-01 | End: 2019-01-01 | Stop reason: HOSPADM

## 2019-01-01 RX ORDER — HEPARIN SODIUM 200 [USP'U]/100ML
500 INJECTION, SOLUTION INTRAVENOUS ONCE
Status: COMPLETED | OUTPATIENT
Start: 2019-01-01 | End: 2019-01-01

## 2019-01-01 RX ORDER — PROPOFOL 10 MG/ML
INJECTION, EMULSION INTRAVENOUS
Status: DISCONTINUED | OUTPATIENT
Start: 2019-01-01 | End: 2019-01-01 | Stop reason: HOSPADM

## 2019-01-01 RX ORDER — HYDROMORPHONE HYDROCHLORIDE 2 MG/ML
1 INJECTION, SOLUTION INTRAMUSCULAR; INTRAVENOUS; SUBCUTANEOUS
Status: DISCONTINUED | OUTPATIENT
Start: 2019-01-01 | End: 2019-01-01 | Stop reason: HOSPADM

## 2019-01-01 RX ORDER — MORPHINE SULFATE 4 MG/ML
2 INJECTION INTRAVENOUS
Status: DISCONTINUED | OUTPATIENT
Start: 2019-01-01 | End: 2019-01-01

## 2019-01-01 RX ORDER — HEPARIN SODIUM 5000 [USP'U]/ML
5000 INJECTION, SOLUTION INTRAVENOUS; SUBCUTANEOUS EVERY 8 HOURS
Status: DISCONTINUED | OUTPATIENT
Start: 2019-01-01 | End: 2019-01-01

## 2019-01-01 RX ORDER — SODIUM CHLORIDE, SODIUM LACTATE, POTASSIUM CHLORIDE, CALCIUM CHLORIDE 600; 310; 30; 20 MG/100ML; MG/100ML; MG/100ML; MG/100ML
150 INJECTION, SOLUTION INTRAVENOUS CONTINUOUS
Status: DISCONTINUED | OUTPATIENT
Start: 2019-01-01 | End: 2019-01-01 | Stop reason: HOSPADM

## 2019-01-01 RX ORDER — ROCURONIUM BROMIDE 10 MG/ML
INJECTION, SOLUTION INTRAVENOUS AS NEEDED
Status: DISCONTINUED | OUTPATIENT
Start: 2019-01-01 | End: 2019-01-01 | Stop reason: HOSPADM

## 2019-01-01 RX ORDER — MORPHINE SULFATE 4 MG/ML
4 INJECTION INTRAVENOUS
Status: DISCONTINUED | OUTPATIENT
Start: 2019-01-01 | End: 2019-01-01 | Stop reason: HOSPADM

## 2019-01-01 RX ORDER — HYDROMORPHONE HYDROCHLORIDE 2 MG/ML
1 INJECTION, SOLUTION INTRAMUSCULAR; INTRAVENOUS; SUBCUTANEOUS EVERY 4 HOURS
Status: DISCONTINUED | OUTPATIENT
Start: 2019-01-01 | End: 2019-01-01

## 2019-01-01 RX ORDER — ONDANSETRON 2 MG/ML
4 INJECTION INTRAMUSCULAR; INTRAVENOUS
Status: DISCONTINUED | OUTPATIENT
Start: 2019-01-01 | End: 2019-01-01 | Stop reason: HOSPADM

## 2019-01-01 RX ORDER — CEFAZOLIN SODIUM/WATER 2 G/20 ML
2 SYRINGE (ML) INTRAVENOUS ONCE
Status: COMPLETED | OUTPATIENT
Start: 2019-01-01 | End: 2019-01-01

## 2019-01-01 RX ORDER — OXYCODONE HCL 20 MG/ML
5 CONCENTRATE, ORAL ORAL
Status: DISCONTINUED | OUTPATIENT
Start: 2019-01-01 | End: 2019-01-01 | Stop reason: HOSPADM

## 2019-01-01 RX ORDER — SENNOSIDES 8.6 MG/1
1 TABLET ORAL DAILY
Status: DISCONTINUED | OUTPATIENT
Start: 2019-01-01 | End: 2019-01-01

## 2019-01-01 RX ORDER — HYDROMORPHONE HYDROCHLORIDE 1 MG/ML
0.5 INJECTION, SOLUTION INTRAMUSCULAR; INTRAVENOUS; SUBCUTANEOUS
Status: DISCONTINUED | OUTPATIENT
Start: 2019-01-01 | End: 2019-01-01 | Stop reason: HOSPADM

## 2019-01-01 RX ORDER — SODIUM CHLORIDE 9 MG/ML
INJECTION, SOLUTION INTRAVENOUS
Status: DISCONTINUED | OUTPATIENT
Start: 2019-01-01 | End: 2019-01-01 | Stop reason: HOSPADM

## 2019-01-01 RX ORDER — HALOPERIDOL 5 MG/ML
2 INJECTION INTRAMUSCULAR
Status: DISCONTINUED | OUTPATIENT
Start: 2019-01-01 | End: 2019-01-01

## 2019-01-01 RX ORDER — METOCLOPRAMIDE HYDROCHLORIDE 5 MG/ML
5 INJECTION INTRAMUSCULAR; INTRAVENOUS ONCE
Status: COMPLETED | OUTPATIENT
Start: 2019-01-01 | End: 2019-01-01

## 2019-01-01 RX ORDER — SODIUM CHLORIDE, SODIUM LACTATE, POTASSIUM CHLORIDE, CALCIUM CHLORIDE 600; 310; 30; 20 MG/100ML; MG/100ML; MG/100ML; MG/100ML
INJECTION, SOLUTION INTRAVENOUS
Status: DISCONTINUED | OUTPATIENT
Start: 2019-01-01 | End: 2019-01-01 | Stop reason: HOSPADM

## 2019-01-01 RX ORDER — IPRATROPIUM BROMIDE AND ALBUTEROL SULFATE 2.5; .5 MG/3ML; MG/3ML
3 SOLUTION RESPIRATORY (INHALATION)
Status: DISCONTINUED | OUTPATIENT
Start: 2019-01-01 | End: 2019-01-01

## 2019-01-01 RX ORDER — HYDROMORPHONE HYDROCHLORIDE 2 MG/ML
2 INJECTION, SOLUTION INTRAMUSCULAR; INTRAVENOUS; SUBCUTANEOUS EVERY 4 HOURS
Status: DISCONTINUED | OUTPATIENT
Start: 2019-01-01 | End: 2019-01-01 | Stop reason: HOSPADM

## 2019-01-01 RX ORDER — SERTRALINE HYDROCHLORIDE 50 MG/1
50 TABLET, FILM COATED ORAL DAILY
COMMUNITY
End: 2019-01-01

## 2019-01-01 RX ORDER — POTASSIUM CHLORIDE 7.45 MG/ML
10 INJECTION INTRAVENOUS
Status: DISCONTINUED | OUTPATIENT
Start: 2019-01-01 | End: 2019-01-01

## 2019-01-01 RX ORDER — DEXTROMETHORPHAN/PSEUDOEPHED 2.5-7.5/.8
1.2 DROPS ORAL
Status: DISCONTINUED | OUTPATIENT
Start: 2019-01-01 | End: 2019-01-01 | Stop reason: HOSPADM

## 2019-01-01 RX ORDER — LORAZEPAM 2 MG/ML
2 INJECTION INTRAMUSCULAR ONCE
Status: COMPLETED | OUTPATIENT
Start: 2019-01-01 | End: 2019-01-01

## 2019-01-01 RX ORDER — LIDOCAINE HYDROCHLORIDE 20 MG/ML
INJECTION, SOLUTION EPIDURAL; INFILTRATION; INTRACAUDAL; PERINEURAL AS NEEDED
Status: DISCONTINUED | OUTPATIENT
Start: 2019-01-01 | End: 2019-01-01 | Stop reason: HOSPADM

## 2019-01-01 RX ORDER — FACIAL-BODY WIPES
10 EACH TOPICAL DAILY PRN
Status: DISCONTINUED | OUTPATIENT
Start: 2019-01-01 | End: 2019-01-01 | Stop reason: HOSPADM

## 2019-01-01 RX ORDER — LEVOFLOXACIN 5 MG/ML
750 INJECTION, SOLUTION INTRAVENOUS
Status: COMPLETED | OUTPATIENT
Start: 2019-01-01 | End: 2019-01-01

## 2019-01-01 RX ORDER — SODIUM CHLORIDE 9 MG/ML
75 INJECTION, SOLUTION INTRAVENOUS CONTINUOUS
Status: DISCONTINUED | OUTPATIENT
Start: 2019-01-01 | End: 2019-01-01

## 2019-01-01 RX ORDER — ENOXAPARIN SODIUM 100 MG/ML
40 INJECTION SUBCUTANEOUS EVERY 24 HOURS
Status: DISCONTINUED | OUTPATIENT
Start: 2019-01-01 | End: 2019-01-01

## 2019-01-01 RX ORDER — FENTANYL CITRATE 50 UG/ML
INJECTION, SOLUTION INTRAMUSCULAR; INTRAVENOUS AS NEEDED
Status: DISCONTINUED | OUTPATIENT
Start: 2019-01-01 | End: 2019-01-01 | Stop reason: HOSPADM

## 2019-01-01 RX ORDER — FENTANYL CITRATE 50 UG/ML
25-200 INJECTION, SOLUTION INTRAMUSCULAR; INTRAVENOUS
Status: DISCONTINUED | OUTPATIENT
Start: 2019-01-01 | End: 2019-01-01 | Stop reason: HOSPADM

## 2019-01-01 RX ORDER — KETOROLAC TROMETHAMINE 30 MG/ML
30 INJECTION, SOLUTION INTRAMUSCULAR; INTRAVENOUS
Status: DISCONTINUED | OUTPATIENT
Start: 2019-01-01 | End: 2019-01-01 | Stop reason: HOSPADM

## 2019-01-01 RX ADMIN — DEXAMETHASONE SODIUM PHOSPHATE 4 MG: 4 INJECTION, SOLUTION INTRAMUSCULAR; INTRAVENOUS at 15:24

## 2019-01-01 RX ADMIN — LORAZEPAM 1 MG: 2 INJECTION INTRAMUSCULAR; INTRAVENOUS at 05:42

## 2019-01-01 RX ADMIN — Medication 10 ML: at 15:25

## 2019-01-01 RX ADMIN — OXYCODONE HYDROCHLORIDE 5 MG: 100 SOLUTION ORAL at 05:48

## 2019-01-01 RX ADMIN — LEVALBUTEROL HYDROCHLORIDE 1.25 MG: 1.25 SOLUTION RESPIRATORY (INHALATION) at 01:22

## 2019-01-01 RX ADMIN — Medication 10 ML: at 14:00

## 2019-01-01 RX ADMIN — SERTRALINE HYDROCHLORIDE 50 MG: 50 TABLET ORAL at 08:00

## 2019-01-01 RX ADMIN — POLYETHYLENE GLYCOL 3350 17 G: 17 POWDER, FOR SOLUTION ORAL at 09:50

## 2019-01-01 RX ADMIN — MORPHINE SULFATE 4 MG: 4 INJECTION, SOLUTION INTRAMUSCULAR; INTRAVENOUS at 20:30

## 2019-01-01 RX ADMIN — ONDANSETRON 4 MG: 2 INJECTION INTRAMUSCULAR; INTRAVENOUS at 03:37

## 2019-01-01 RX ADMIN — ACETAMINOPHEN 650 MG: 325 TABLET ORAL at 10:00

## 2019-01-01 RX ADMIN — LORAZEPAM 1 MG: 2 INJECTION INTRAMUSCULAR; INTRAVENOUS at 17:00

## 2019-01-01 RX ADMIN — Medication 10 ML: at 16:31

## 2019-01-01 RX ADMIN — Medication 10 ML: at 19:32

## 2019-01-01 RX ADMIN — LORAZEPAM 1 MG: 2 INJECTION INTRAMUSCULAR; INTRAVENOUS at 14:10

## 2019-01-01 RX ADMIN — MIDAZOLAM HYDROCHLORIDE 1 MG: 1 INJECTION, SOLUTION INTRAMUSCULAR; INTRAVENOUS at 09:57

## 2019-01-01 RX ADMIN — LEVALBUTEROL HYDROCHLORIDE 1.25 MG: 1.25 SOLUTION RESPIRATORY (INHALATION) at 13:35

## 2019-01-01 RX ADMIN — LEVALBUTEROL HYDROCHLORIDE 1.25 MG: 1.25 SOLUTION RESPIRATORY (INHALATION) at 02:05

## 2019-01-01 RX ADMIN — LEVALBUTEROL HYDROCHLORIDE 1.25 MG: 1.25 SOLUTION RESPIRATORY (INHALATION) at 08:29

## 2019-01-01 RX ADMIN — ONDANSETRON 4 MG: 2 INJECTION INTRAMUSCULAR; INTRAVENOUS at 07:08

## 2019-01-01 RX ADMIN — ONDANSETRON 4 MG: 2 INJECTION INTRAMUSCULAR; INTRAVENOUS at 01:16

## 2019-01-01 RX ADMIN — IPRATROPIUM BROMIDE AND ALBUTEROL SULFATE 3 ML: .5; 3 SOLUTION RESPIRATORY (INHALATION) at 15:39

## 2019-01-01 RX ADMIN — LEVALBUTEROL HYDROCHLORIDE 1.25 MG: 1.25 SOLUTION RESPIRATORY (INHALATION) at 07:18

## 2019-01-01 RX ADMIN — ACETAMINOPHEN 650 MG: 325 TABLET ORAL at 04:40

## 2019-01-01 RX ADMIN — LEVALBUTEROL HYDROCHLORIDE 1.25 MG: 1.25 SOLUTION RESPIRATORY (INHALATION) at 20:20

## 2019-01-01 RX ADMIN — Medication 10 ML: at 03:38

## 2019-01-01 RX ADMIN — LEVALBUTEROL HYDROCHLORIDE 1.25 MG: 1.25 SOLUTION RESPIRATORY (INHALATION) at 13:28

## 2019-01-01 RX ADMIN — LORAZEPAM 1 MG: 2 INJECTION INTRAMUSCULAR; INTRAVENOUS at 20:41

## 2019-01-01 RX ADMIN — Medication 10 ML: at 21:25

## 2019-01-01 RX ADMIN — Medication 10 ML: at 14:52

## 2019-01-01 RX ADMIN — Medication 10 ML: at 21:58

## 2019-01-01 RX ADMIN — LEVALBUTEROL HYDROCHLORIDE 1.25 MG: 1.25 SOLUTION RESPIRATORY (INHALATION) at 13:26

## 2019-01-01 RX ADMIN — LORAZEPAM 1 MG: 2 INJECTION INTRAMUSCULAR; INTRAVENOUS at 10:27

## 2019-01-01 RX ADMIN — LEVALBUTEROL HYDROCHLORIDE 1.25 MG: 1.25 SOLUTION RESPIRATORY (INHALATION) at 02:08

## 2019-01-01 RX ADMIN — DEXTROSE MONOHYDRATE, SODIUM CHLORIDE, AND POTASSIUM CHLORIDE 75 ML/HR: 50; 4.5; 1.49 INJECTION, SOLUTION INTRAVENOUS at 19:56

## 2019-01-01 RX ADMIN — ONDANSETRON 4 MG: 2 INJECTION INTRAMUSCULAR; INTRAVENOUS at 07:36

## 2019-01-01 RX ADMIN — Medication 10 ML: at 05:24

## 2019-01-01 RX ADMIN — MORPHINE SULFATE 4 MG: 4 INJECTION, SOLUTION INTRAMUSCULAR; INTRAVENOUS at 14:51

## 2019-01-01 RX ADMIN — HALOPERIDOL LACTATE 1 MG: 5 INJECTION, SOLUTION INTRAMUSCULAR at 20:19

## 2019-01-01 RX ADMIN — ONDANSETRON 4 MG: 2 INJECTION INTRAMUSCULAR; INTRAVENOUS at 06:34

## 2019-01-01 RX ADMIN — Medication 10 ML: at 06:47

## 2019-01-01 RX ADMIN — OXYCODONE HYDROCHLORIDE 5 MG: 100 SOLUTION ORAL at 06:34

## 2019-01-01 RX ADMIN — Medication 10 ML: at 21:28

## 2019-01-01 RX ADMIN — ALUMINUM HYDROXIDE AND MAGNESIUM HYDROXIDE 30 ML: 200; 200 SUSPENSION ORAL at 03:04

## 2019-01-01 RX ADMIN — HYDROMORPHONE HYDROCHLORIDE 2 MG: 2 INJECTION INTRAMUSCULAR; INTRAVENOUS; SUBCUTANEOUS at 05:41

## 2019-01-01 RX ADMIN — Medication 2 G: at 10:45

## 2019-01-01 RX ADMIN — LEVALBUTEROL HYDROCHLORIDE 1.25 MG: 1.25 SOLUTION RESPIRATORY (INHALATION) at 13:38

## 2019-01-01 RX ADMIN — OXYCODONE HYDROCHLORIDE 5 MG: 100 SOLUTION ORAL at 12:49

## 2019-01-01 RX ADMIN — HYDROMORPHONE HYDROCHLORIDE 1 MG: 2 INJECTION INTRAMUSCULAR; INTRAVENOUS; SUBCUTANEOUS at 05:02

## 2019-01-01 RX ADMIN — DEXTROSE MONOHYDRATE AND SODIUM CHLORIDE 100 ML/HR: 5; .45 INJECTION, SOLUTION INTRAVENOUS at 15:24

## 2019-01-01 RX ADMIN — MORPHINE SULFATE 2 MG: 4 INJECTION, SOLUTION INTRAMUSCULAR; INTRAVENOUS at 00:31

## 2019-01-01 RX ADMIN — SODIUM CHLORIDE: 9 INJECTION, SOLUTION INTRAVENOUS at 08:13

## 2019-01-01 RX ADMIN — LORAZEPAM 1 MG: 2 INJECTION INTRAMUSCULAR; INTRAVENOUS at 20:02

## 2019-01-01 RX ADMIN — Medication 10 ML: at 21:37

## 2019-01-01 RX ADMIN — LEVALBUTEROL HYDROCHLORIDE 1.25 MG: 1.25 SOLUTION RESPIRATORY (INHALATION) at 20:45

## 2019-01-01 RX ADMIN — ONDANSETRON 4 MG: 2 INJECTION INTRAMUSCULAR; INTRAVENOUS at 21:37

## 2019-01-01 RX ADMIN — LEVALBUTEROL HYDROCHLORIDE 1.25 MG: 1.25 SOLUTION RESPIRATORY (INHALATION) at 14:12

## 2019-01-01 RX ADMIN — LEVALBUTEROL HYDROCHLORIDE 1.25 MG: 1.25 SOLUTION RESPIRATORY (INHALATION) at 21:10

## 2019-01-01 RX ADMIN — SERTRALINE HYDROCHLORIDE 50 MG: 50 TABLET ORAL at 10:10

## 2019-01-01 RX ADMIN — LEVALBUTEROL HYDROCHLORIDE 1.25 MG: 1.25 SOLUTION RESPIRATORY (INHALATION) at 13:54

## 2019-01-01 RX ADMIN — SODIUM CHLORIDE 75 ML/HR: 900 INJECTION, SOLUTION INTRAVENOUS at 16:43

## 2019-01-01 RX ADMIN — IPRATROPIUM BROMIDE AND ALBUTEROL SULFATE 3 ML: .5; 3 SOLUTION RESPIRATORY (INHALATION) at 19:49

## 2019-01-01 RX ADMIN — LEVALBUTEROL HYDROCHLORIDE 1.25 MG: 1.25 SOLUTION RESPIRATORY (INHALATION) at 13:43

## 2019-01-01 RX ADMIN — DEXTROSE MONOHYDRATE AND SODIUM CHLORIDE 100 ML/HR: 5; .45 INJECTION, SOLUTION INTRAVENOUS at 09:43

## 2019-01-01 RX ADMIN — Medication 10 ML: at 05:23

## 2019-01-01 RX ADMIN — HYDROMORPHONE HYDROCHLORIDE 1 MG: 2 INJECTION INTRAMUSCULAR; INTRAVENOUS; SUBCUTANEOUS at 00:27

## 2019-01-01 RX ADMIN — Medication 10 ML: at 05:04

## 2019-01-01 RX ADMIN — MORPHINE SULFATE 4 MG: 4 INJECTION, SOLUTION INTRAMUSCULAR; INTRAVENOUS at 01:30

## 2019-01-01 RX ADMIN — Medication 10 ML: at 13:05

## 2019-01-01 RX ADMIN — ONDANSETRON 4 MG: 2 INJECTION INTRAMUSCULAR; INTRAVENOUS at 18:48

## 2019-01-01 RX ADMIN — SERTRALINE HYDROCHLORIDE 50 MG: 50 TABLET ORAL at 09:52

## 2019-01-01 RX ADMIN — SERTRALINE HYDROCHLORIDE 50 MG: 50 TABLET ORAL at 09:17

## 2019-01-01 RX ADMIN — ONDANSETRON 4 MG: 2 INJECTION INTRAMUSCULAR; INTRAVENOUS at 11:09

## 2019-01-01 RX ADMIN — HEPARIN SODIUM 5000 UNITS: 5000 INJECTION INTRAVENOUS; SUBCUTANEOUS at 05:42

## 2019-01-01 RX ADMIN — LEVALBUTEROL HYDROCHLORIDE 1.25 MG: 1.25 SOLUTION RESPIRATORY (INHALATION) at 08:14

## 2019-01-01 RX ADMIN — DEXTROSE MONOHYDRATE, SODIUM CHLORIDE, AND POTASSIUM CHLORIDE 75 ML/HR: 50; 4.5; 1.49 INJECTION, SOLUTION INTRAVENOUS at 03:53

## 2019-01-01 RX ADMIN — GLYCOPYRROLATE 0.2 MG: 0.2 INJECTION, SOLUTION INTRAMUSCULAR; INTRAVENOUS at 01:07

## 2019-01-01 RX ADMIN — MORPHINE SULFATE 4 MG: 4 INJECTION, SOLUTION INTRAMUSCULAR; INTRAVENOUS at 22:17

## 2019-01-01 RX ADMIN — LIDOCAINE HYDROCHLORIDE 60 MG: 20 INJECTION, SOLUTION EPIDURAL; INFILTRATION; INTRACAUDAL; PERINEURAL at 10:37

## 2019-01-01 RX ADMIN — LORAZEPAM: 2 INJECTION INTRAMUSCULAR; INTRAVENOUS at 18:30

## 2019-01-01 RX ADMIN — LEVALBUTEROL HYDROCHLORIDE 1.25 MG: 1.25 SOLUTION RESPIRATORY (INHALATION) at 20:28

## 2019-01-01 RX ADMIN — Medication 10 ML: at 21:02

## 2019-01-01 RX ADMIN — OXYCODONE HYDROCHLORIDE 5 MG: 100 SOLUTION ORAL at 13:45

## 2019-01-01 RX ADMIN — SERTRALINE HYDROCHLORIDE 50 MG: 50 TABLET ORAL at 09:49

## 2019-01-01 RX ADMIN — ONDANSETRON 4 MG: 2 INJECTION INTRAMUSCULAR; INTRAVENOUS at 11:48

## 2019-01-01 RX ADMIN — LEVALBUTEROL HYDROCHLORIDE 1.25 MG: 1.25 SOLUTION RESPIRATORY (INHALATION) at 12:21

## 2019-01-01 RX ADMIN — Medication 10 ML: at 20:04

## 2019-01-01 RX ADMIN — ONDANSETRON 4 MG: 2 INJECTION INTRAMUSCULAR; INTRAVENOUS at 20:30

## 2019-01-01 RX ADMIN — DEXTROSE MONOHYDRATE AND SODIUM CHLORIDE 75 ML/HR: 5; .45 INJECTION, SOLUTION INTRAVENOUS at 01:16

## 2019-01-01 RX ADMIN — HYDROMORPHONE HYDROCHLORIDE 2 MG: 2 INJECTION INTRAMUSCULAR; INTRAVENOUS; SUBCUTANEOUS at 16:13

## 2019-01-01 RX ADMIN — Medication 10 ML: at 13:23

## 2019-01-01 RX ADMIN — DEXTROSE MONOHYDRATE, SODIUM CHLORIDE, AND POTASSIUM CHLORIDE 75 ML/HR: 50; 4.5; 1.49 INJECTION, SOLUTION INTRAVENOUS at 14:54

## 2019-01-01 RX ADMIN — ACETAMINOPHEN 650 MG: 325 TABLET ORAL at 11:11

## 2019-01-01 RX ADMIN — SODIUM CHLORIDE 500 ML: 900 INJECTION, SOLUTION INTRAVENOUS at 07:41

## 2019-01-01 RX ADMIN — OXYCODONE HYDROCHLORIDE 5 MG: 100 SOLUTION ORAL at 21:27

## 2019-01-01 RX ADMIN — ONDANSETRON 4 MG: 2 INJECTION INTRAMUSCULAR; INTRAVENOUS at 19:59

## 2019-01-01 RX ADMIN — Medication 10 ML: at 05:20

## 2019-01-01 RX ADMIN — OXYCODONE HYDROCHLORIDE 5 MG: 100 SOLUTION ORAL at 03:37

## 2019-01-01 RX ADMIN — LEVALBUTEROL HYDROCHLORIDE 1.25 MG: 1.25 SOLUTION RESPIRATORY (INHALATION) at 13:09

## 2019-01-01 RX ADMIN — PROCHLORPERAZINE EDISYLATE 5 MG: 5 INJECTION INTRAMUSCULAR; INTRAVENOUS at 15:24

## 2019-01-01 RX ADMIN — LEVOFLOXACIN 750 MG: 5 INJECTION, SOLUTION INTRAVENOUS at 15:11

## 2019-01-01 RX ADMIN — Medication 10 ML: at 20:31

## 2019-01-01 RX ADMIN — HYDROMORPHONE HYDROCHLORIDE 2 MG: 2 INJECTION INTRAMUSCULAR; INTRAVENOUS; SUBCUTANEOUS at 01:05

## 2019-01-01 RX ADMIN — OXYCODONE HYDROCHLORIDE 5 MG: 100 SOLUTION ORAL at 07:59

## 2019-01-01 RX ADMIN — OXYCODONE HYDROCHLORIDE 5 MG: 100 SOLUTION ORAL at 01:16

## 2019-01-01 RX ADMIN — DIPHENHYDRAMINE HYDROCHLORIDE 50 MG: 50 INJECTION, SOLUTION INTRAMUSCULAR; INTRAVENOUS at 09:34

## 2019-01-01 RX ADMIN — SENNOSIDES 8.6 MG: 8.6 TABLET, FILM COATED ORAL at 09:24

## 2019-01-01 RX ADMIN — ACETAMINOPHEN 650 MG: 325 TABLET ORAL at 03:45

## 2019-01-01 RX ADMIN — Medication 10 ML: at 21:20

## 2019-01-01 RX ADMIN — ONDANSETRON 4 MG: 2 INJECTION INTRAMUSCULAR; INTRAVENOUS at 01:09

## 2019-01-01 RX ADMIN — SODIUM CHLORIDE 1000 ML: 900 INJECTION, SOLUTION INTRAVENOUS at 16:00

## 2019-01-01 RX ADMIN — DEXTROSE MONOHYDRATE AND SODIUM CHLORIDE 100 ML/HR: 5; .45 INJECTION, SOLUTION INTRAVENOUS at 04:42

## 2019-01-01 RX ADMIN — ONDANSETRON 4 MG: 2 INJECTION INTRAMUSCULAR; INTRAVENOUS at 08:18

## 2019-01-01 RX ADMIN — HYDROMORPHONE HYDROCHLORIDE 1 MG: 2 INJECTION INTRAMUSCULAR; INTRAVENOUS; SUBCUTANEOUS at 08:31

## 2019-01-01 RX ADMIN — LEVALBUTEROL HYDROCHLORIDE 1.25 MG: 1.25 SOLUTION RESPIRATORY (INHALATION) at 01:26

## 2019-01-01 RX ADMIN — LIDOCAINE HYDROCHLORIDE 10 ML: 10 INJECTION, SOLUTION INFILTRATION; PERINEURAL at 09:57

## 2019-01-01 RX ADMIN — DEXTROSE MONOHYDRATE AND SODIUM CHLORIDE 100 ML/HR: 5; .45 INJECTION, SOLUTION INTRAVENOUS at 20:03

## 2019-01-01 RX ADMIN — LORAZEPAM 1 MG: 2 INJECTION INTRAMUSCULAR; INTRAVENOUS at 05:02

## 2019-01-01 RX ADMIN — Medication 10 ML: at 22:14

## 2019-01-01 RX ADMIN — LEVALBUTEROL HYDROCHLORIDE 1.25 MG: 1.25 SOLUTION RESPIRATORY (INHALATION) at 13:20

## 2019-01-01 RX ADMIN — LEVALBUTEROL HYDROCHLORIDE 1.25 MG: 1.25 SOLUTION RESPIRATORY (INHALATION) at 08:27

## 2019-01-01 RX ADMIN — MORPHINE SULFATE 4 MG: 4 INJECTION, SOLUTION INTRAMUSCULAR; INTRAVENOUS at 10:10

## 2019-01-01 RX ADMIN — Medication 10 ML: at 05:16

## 2019-01-01 RX ADMIN — LEVALBUTEROL HYDROCHLORIDE 1.25 MG: 1.25 SOLUTION RESPIRATORY (INHALATION) at 20:00

## 2019-01-01 RX ADMIN — Medication 10 ML: at 15:27

## 2019-01-01 RX ADMIN — MORPHINE SULFATE 4 MG: 4 INJECTION, SOLUTION INTRAMUSCULAR; INTRAVENOUS at 19:58

## 2019-01-01 RX ADMIN — SENNOSIDES 8.6 MG: 8.6 TABLET, FILM COATED ORAL at 09:52

## 2019-01-01 RX ADMIN — MIDAZOLAM HYDROCHLORIDE 5 MG: 1 INJECTION, SOLUTION INTRAMUSCULAR; INTRAVENOUS at 10:29

## 2019-01-01 RX ADMIN — FENTANYL CITRATE 50 MCG: 50 INJECTION, SOLUTION INTRAMUSCULAR; INTRAVENOUS at 11:15

## 2019-01-01 RX ADMIN — MORPHINE SULFATE 2 MG: 4 INJECTION, SOLUTION INTRAMUSCULAR; INTRAVENOUS at 16:27

## 2019-01-01 RX ADMIN — Medication 2 G: at 09:45

## 2019-01-01 RX ADMIN — ACETAMINOPHEN 650 MG: 325 TABLET ORAL at 21:53

## 2019-01-01 RX ADMIN — MORPHINE SULFATE 2 MG: 4 INJECTION, SOLUTION INTRAMUSCULAR; INTRAVENOUS at 14:32

## 2019-01-01 RX ADMIN — DEXTROSE MONOHYDRATE, SODIUM CHLORIDE, AND POTASSIUM CHLORIDE 75 ML/HR: 50; 4.5; 1.49 INJECTION, SOLUTION INTRAVENOUS at 20:49

## 2019-01-01 RX ADMIN — ONDANSETRON 4 MG: 2 INJECTION INTRAMUSCULAR; INTRAVENOUS at 20:05

## 2019-01-01 RX ADMIN — GLYCOPYRROLATE 0.3 MG: 0.2 INJECTION INTRAMUSCULAR; INTRAVENOUS at 11:51

## 2019-01-01 RX ADMIN — MORPHINE SULFATE 4 MG: 4 INJECTION, SOLUTION INTRAMUSCULAR; INTRAVENOUS at 09:17

## 2019-01-01 RX ADMIN — ACETAMINOPHEN 650 MG: 325 TABLET ORAL at 11:59

## 2019-01-01 RX ADMIN — MORPHINE SULFATE 2 MG: 4 INJECTION, SOLUTION INTRAMUSCULAR; INTRAVENOUS at 06:46

## 2019-01-01 RX ADMIN — OXYCODONE HYDROCHLORIDE 5 MG: 100 SOLUTION ORAL at 14:48

## 2019-01-01 RX ADMIN — OXYCODONE HYDROCHLORIDE 5 MG: 100 SOLUTION ORAL at 00:20

## 2019-01-01 RX ADMIN — OXYCODONE HYDROCHLORIDE 5 MG: 100 SOLUTION ORAL at 15:16

## 2019-01-01 RX ADMIN — OXYCODONE HYDROCHLORIDE 5 MG: 100 SOLUTION ORAL at 18:10

## 2019-01-01 RX ADMIN — ROCURONIUM BROMIDE 50 MG: 10 INJECTION, SOLUTION INTRAVENOUS at 10:56

## 2019-01-01 RX ADMIN — MORPHINE SULFATE 4 MG: 4 INJECTION, SOLUTION INTRAMUSCULAR; INTRAVENOUS at 17:22

## 2019-01-01 RX ADMIN — PROPOFOL 110 MCG/KG/MIN: 10 INJECTION, EMULSION INTRAVENOUS at 08:10

## 2019-01-01 RX ADMIN — FENTANYL CITRATE 50 MCG: 50 INJECTION, SOLUTION INTRAMUSCULAR; INTRAVENOUS at 09:45

## 2019-01-01 RX ADMIN — HALOPERIDOL LACTATE 1 MG: 5 INJECTION, SOLUTION INTRAMUSCULAR at 10:46

## 2019-01-01 RX ADMIN — ONDANSETRON 4 MG: 2 INJECTION INTRAMUSCULAR; INTRAVENOUS at 16:14

## 2019-01-01 RX ADMIN — HYDROMORPHONE HYDROCHLORIDE 1 MG: 2 INJECTION INTRAMUSCULAR; INTRAVENOUS; SUBCUTANEOUS at 20:40

## 2019-01-01 RX ADMIN — LORAZEPAM: 2 INJECTION INTRAMUSCULAR; INTRAVENOUS at 19:38

## 2019-01-01 RX ADMIN — ONDANSETRON 4 MG: 2 INJECTION INTRAMUSCULAR; INTRAVENOUS at 01:29

## 2019-01-01 RX ADMIN — Medication 10 ML: at 05:42

## 2019-01-01 RX ADMIN — LEVALBUTEROL HYDROCHLORIDE 1.25 MG: 1.25 SOLUTION RESPIRATORY (INHALATION) at 20:41

## 2019-01-01 RX ADMIN — LEVALBUTEROL HYDROCHLORIDE 1.25 MG: 1.25 SOLUTION RESPIRATORY (INHALATION) at 01:50

## 2019-01-01 RX ADMIN — LEVALBUTEROL HYDROCHLORIDE 1.25 MG: 1.25 SOLUTION RESPIRATORY (INHALATION) at 07:40

## 2019-01-01 RX ADMIN — LORAZEPAM 1 MG: 2 INJECTION INTRAMUSCULAR; INTRAVENOUS at 00:27

## 2019-01-01 RX ADMIN — Medication 10 ML: at 07:08

## 2019-01-01 RX ADMIN — Medication 10 ML: at 06:00

## 2019-01-01 RX ADMIN — LEVALBUTEROL HYDROCHLORIDE 1.25 MG: 1.25 SOLUTION RESPIRATORY (INHALATION) at 20:01

## 2019-01-01 RX ADMIN — MORPHINE SULFATE 2 MG: 4 INJECTION, SOLUTION INTRAMUSCULAR; INTRAVENOUS at 13:04

## 2019-01-01 RX ADMIN — MORPHINE SULFATE 4 MG: 4 INJECTION, SOLUTION INTRAMUSCULAR; INTRAVENOUS at 01:29

## 2019-01-01 RX ADMIN — HEPARIN SODIUM 1000 UNITS: 200 INJECTION, SOLUTION INTRAVENOUS at 09:47

## 2019-01-01 RX ADMIN — LEVALBUTEROL HYDROCHLORIDE 1.25 MG: 1.25 SOLUTION RESPIRATORY (INHALATION) at 08:09

## 2019-01-01 RX ADMIN — SERTRALINE HYDROCHLORIDE 50 MG: 50 TABLET ORAL at 09:36

## 2019-01-01 RX ADMIN — MORPHINE SULFATE 2 MG: 4 INJECTION, SOLUTION INTRAMUSCULAR; INTRAVENOUS at 04:41

## 2019-01-01 RX ADMIN — SODIUM CHLORIDE 75 ML/HR: 900 INJECTION, SOLUTION INTRAVENOUS at 08:46

## 2019-01-01 RX ADMIN — DEXTROSE MONOHYDRATE AND SODIUM CHLORIDE 100 ML/HR: 5; .45 INJECTION, SOLUTION INTRAVENOUS at 19:31

## 2019-01-01 RX ADMIN — MORPHINE SULFATE 4 MG: 4 INJECTION, SOLUTION INTRAMUSCULAR; INTRAVENOUS at 09:38

## 2019-01-01 RX ADMIN — Medication 10 ML: at 21:27

## 2019-01-01 RX ADMIN — IPRATROPIUM BROMIDE 0.5 MG: 0.5 SOLUTION RESPIRATORY (INHALATION) at 12:21

## 2019-01-01 RX ADMIN — DEXTROSE MONOHYDRATE, SODIUM CHLORIDE, AND POTASSIUM CHLORIDE 75 ML/HR: 50; 4.5; 1.49 INJECTION, SOLUTION INTRAVENOUS at 09:23

## 2019-01-01 RX ADMIN — GLYCOPYRROLATE 0.2 MG: 0.2 INJECTION, SOLUTION INTRAMUSCULAR; INTRAVENOUS at 20:01

## 2019-01-01 RX ADMIN — OXYCODONE HYDROCHLORIDE 5 MG: 100 SOLUTION ORAL at 07:36

## 2019-01-01 RX ADMIN — FENTANYL CITRATE 50 MCG: 50 INJECTION, SOLUTION INTRAMUSCULAR; INTRAVENOUS at 10:33

## 2019-01-01 RX ADMIN — LORAZEPAM: 2 INJECTION INTRAMUSCULAR; INTRAVENOUS at 07:27

## 2019-01-01 RX ADMIN — DEXTROSE MONOHYDRATE, SODIUM CHLORIDE, AND POTASSIUM CHLORIDE 75 ML/HR: 50; 4.5; 1.49 INJECTION, SOLUTION INTRAVENOUS at 11:17

## 2019-01-01 RX ADMIN — SERTRALINE HYDROCHLORIDE 50 MG: 50 TABLET ORAL at 08:42

## 2019-01-01 RX ADMIN — LORAZEPAM 1 MG: 2 INJECTION INTRAMUSCULAR; INTRAVENOUS at 08:56

## 2019-01-01 RX ADMIN — ONDANSETRON 4 MG: 2 INJECTION INTRAMUSCULAR; INTRAVENOUS at 10:45

## 2019-01-01 RX ADMIN — MORPHINE SULFATE 4 MG: 4 INJECTION, SOLUTION INTRAMUSCULAR; INTRAVENOUS at 15:26

## 2019-01-01 RX ADMIN — HYDROMORPHONE HYDROCHLORIDE 2 MG: 2 INJECTION INTRAMUSCULAR; INTRAVENOUS; SUBCUTANEOUS at 20:02

## 2019-01-01 RX ADMIN — GLYCOPYRROLATE 0.2 MG: 0.2 INJECTION, SOLUTION INTRAMUSCULAR; INTRAVENOUS at 12:38

## 2019-01-01 RX ADMIN — Medication 20 ML: at 22:00

## 2019-01-01 RX ADMIN — DEXTROSE MONOHYDRATE, SODIUM CHLORIDE, AND POTASSIUM CHLORIDE 75 ML/HR: 50; 4.5; 1.49 INJECTION, SOLUTION INTRAVENOUS at 14:33

## 2019-01-01 RX ADMIN — DEXTROSE MONOHYDRATE AND SODIUM CHLORIDE 100 ML/HR: 5; .45 INJECTION, SOLUTION INTRAVENOUS at 01:03

## 2019-01-01 RX ADMIN — LEVALBUTEROL HYDROCHLORIDE 1.25 MG: 1.25 SOLUTION RESPIRATORY (INHALATION) at 07:56

## 2019-01-01 RX ADMIN — MORPHINE SULFATE 4 MG: 4 INJECTION, SOLUTION INTRAMUSCULAR; INTRAVENOUS at 15:10

## 2019-01-01 RX ADMIN — POTASSIUM CHLORIDE 40 MEQ: 1.5 POWDER, FOR SOLUTION ORAL at 09:24

## 2019-01-01 RX ADMIN — MORPHINE SULFATE 2 MG: 4 INJECTION, SOLUTION INTRAMUSCULAR; INTRAVENOUS at 20:05

## 2019-01-01 RX ADMIN — FENTANYL CITRATE 50 MCG: 50 INJECTION, SOLUTION INTRAMUSCULAR; INTRAVENOUS at 10:37

## 2019-01-01 RX ADMIN — MORPHINE SULFATE 4 MG: 4 INJECTION, SOLUTION INTRAMUSCULAR; INTRAVENOUS at 21:27

## 2019-01-01 RX ADMIN — Medication 10 ML: at 06:45

## 2019-01-01 RX ADMIN — FENTANYL CITRATE 50 MCG: 50 INJECTION, SOLUTION INTRAMUSCULAR; INTRAVENOUS at 08:58

## 2019-01-01 RX ADMIN — MORPHINE SULFATE 4 MG: 4 INJECTION, SOLUTION INTRAMUSCULAR; INTRAVENOUS at 09:35

## 2019-01-01 RX ADMIN — SODIUM CHLORIDE 100 ML/HR: 900 INJECTION, SOLUTION INTRAVENOUS at 03:15

## 2019-01-01 RX ADMIN — MORPHINE SULFATE 4 MG: 4 INJECTION, SOLUTION INTRAMUSCULAR; INTRAVENOUS at 13:20

## 2019-01-01 RX ADMIN — OXYCODONE HYDROCHLORIDE 5 MG: 100 SOLUTION ORAL at 01:03

## 2019-01-01 RX ADMIN — MORPHINE SULFATE 2 MG: 4 INJECTION, SOLUTION INTRAMUSCULAR; INTRAVENOUS at 11:47

## 2019-01-01 RX ADMIN — OXYCODONE HYDROCHLORIDE 5 MG: 100 SOLUTION ORAL at 09:46

## 2019-01-01 RX ADMIN — ONDANSETRON 4 MG: 2 INJECTION INTRAMUSCULAR; INTRAVENOUS at 00:31

## 2019-01-01 RX ADMIN — ONDANSETRON 4 MG: 2 INJECTION INTRAMUSCULAR; INTRAVENOUS at 04:41

## 2019-01-01 RX ADMIN — SODIUM CHLORIDE 500 ML: 900 INJECTION, SOLUTION INTRAVENOUS at 08:47

## 2019-01-01 RX ADMIN — Medication 10 ML: at 20:48

## 2019-01-01 RX ADMIN — ACETAMINOPHEN 650 MG: 325 TABLET ORAL at 21:28

## 2019-01-01 RX ADMIN — ONDANSETRON 4 MG: 2 INJECTION INTRAMUSCULAR; INTRAVENOUS at 14:48

## 2019-01-01 RX ADMIN — SERTRALINE HYDROCHLORIDE 50 MG: 50 TABLET ORAL at 09:53

## 2019-01-01 RX ADMIN — OXYCODONE HYDROCHLORIDE 5 MG: 100 SOLUTION ORAL at 11:09

## 2019-01-01 RX ADMIN — Medication 10 ML: at 05:09

## 2019-01-01 RX ADMIN — IPRATROPIUM BROMIDE AND ALBUTEROL SULFATE 3 ML: .5; 3 SOLUTION RESPIRATORY (INHALATION) at 08:56

## 2019-01-01 RX ADMIN — Medication 10 ML: at 01:29

## 2019-01-01 RX ADMIN — ROCURONIUM BROMIDE 50 MG: 10 INJECTION, SOLUTION INTRAVENOUS at 10:38

## 2019-01-01 RX ADMIN — LORAZEPAM 1 MG: 2 INJECTION INTRAMUSCULAR; INTRAVENOUS at 22:57

## 2019-01-01 RX ADMIN — SODIUM CHLORIDE 75 ML/HR: 900 INJECTION, SOLUTION INTRAVENOUS at 04:41

## 2019-01-01 RX ADMIN — GLYCOPYRROLATE 0.2 MG: 0.2 INJECTION, SOLUTION INTRAMUSCULAR; INTRAVENOUS at 16:14

## 2019-01-01 RX ADMIN — ROCURONIUM BROMIDE 10 MG: 10 INJECTION, SOLUTION INTRAVENOUS at 11:19

## 2019-01-01 RX ADMIN — POTASSIUM CHLORIDE 10 MEQ: 750 TABLET, EXTENDED RELEASE ORAL at 18:38

## 2019-01-01 RX ADMIN — ONDANSETRON 4 MG: 2 INJECTION INTRAMUSCULAR; INTRAVENOUS at 08:31

## 2019-01-01 RX ADMIN — LEVALBUTEROL HYDROCHLORIDE 1.25 MG: 1.25 SOLUTION RESPIRATORY (INHALATION) at 01:16

## 2019-01-01 RX ADMIN — LEVALBUTEROL HYDROCHLORIDE 1.25 MG: 1.25 SOLUTION RESPIRATORY (INHALATION) at 07:31

## 2019-01-01 RX ADMIN — DEXTROSE MONOHYDRATE AND SODIUM CHLORIDE 100 ML/HR: 5; .45 INJECTION, SOLUTION INTRAVENOUS at 03:02

## 2019-01-01 RX ADMIN — PROPOFOL 100 MG: 10 INJECTION, EMULSION INTRAVENOUS at 10:37

## 2019-01-01 RX ADMIN — LEVALBUTEROL HYDROCHLORIDE 1.25 MG: 1.25 SOLUTION RESPIRATORY (INHALATION) at 13:42

## 2019-01-01 RX ADMIN — MORPHINE SULFATE 2 MG: 4 INJECTION, SOLUTION INTRAMUSCULAR; INTRAVENOUS at 22:13

## 2019-01-01 RX ADMIN — LEVALBUTEROL HYDROCHLORIDE 1.25 MG: 1.25 SOLUTION RESPIRATORY (INHALATION) at 08:31

## 2019-01-01 RX ADMIN — LEVALBUTEROL HYDROCHLORIDE 1.25 MG: 1.25 SOLUTION RESPIRATORY (INHALATION) at 07:42

## 2019-01-01 RX ADMIN — LEVALBUTEROL HYDROCHLORIDE 1.25 MG: 1.25 SOLUTION RESPIRATORY (INHALATION) at 21:17

## 2019-01-01 RX ADMIN — Medication 10 ML: at 21:53

## 2019-01-01 RX ADMIN — SERTRALINE HYDROCHLORIDE 50 MG: 50 TABLET ORAL at 08:55

## 2019-01-01 RX ADMIN — Medication 10 ML: at 14:49

## 2019-01-01 RX ADMIN — SODIUM CHLORIDE, SODIUM LACTATE, POTASSIUM CHLORIDE, CALCIUM CHLORIDE: 600; 310; 30; 20 INJECTION, SOLUTION INTRAVENOUS at 11:50

## 2019-01-01 RX ADMIN — IPRATROPIUM BROMIDE 0.5 MG: 0.5 SOLUTION RESPIRATORY (INHALATION) at 07:57

## 2019-01-01 RX ADMIN — ONDANSETRON 4 MG: 2 INJECTION INTRAMUSCULAR; INTRAVENOUS at 14:51

## 2019-01-01 RX ADMIN — PROPOFOL 70 MG: 10 INJECTION, EMULSION INTRAVENOUS at 08:07

## 2019-01-01 RX ADMIN — ONDANSETRON 4 MG: 2 INJECTION INTRAMUSCULAR; INTRAVENOUS at 17:46

## 2019-01-01 RX ADMIN — LORAZEPAM 1 MG: 2 INJECTION INTRAMUSCULAR; INTRAVENOUS at 01:06

## 2019-01-01 RX ADMIN — SERTRALINE HYDROCHLORIDE 50 MG: 50 TABLET ORAL at 08:40

## 2019-01-01 RX ADMIN — LEVALBUTEROL HYDROCHLORIDE 1.25 MG: 1.25 SOLUTION RESPIRATORY (INHALATION) at 07:57

## 2019-01-01 RX ADMIN — Medication 10 ML: at 15:55

## 2019-01-01 RX ADMIN — ALBUMIN HUMAN 12.5 G: 50 SOLUTION INTRAVENOUS at 11:38

## 2019-01-01 RX ADMIN — SODIUM CHLORIDE 75 ML/HR: 900 INJECTION, SOLUTION INTRAVENOUS at 00:00

## 2019-01-01 RX ADMIN — IOHEXOL 50 ML: 350 INJECTION, SOLUTION INTRAVENOUS at 11:13

## 2019-01-01 RX ADMIN — HYDROMORPHONE HYDROCHLORIDE 2 MG: 2 INJECTION INTRAMUSCULAR; INTRAVENOUS; SUBCUTANEOUS at 08:56

## 2019-01-01 RX ADMIN — NEOSTIGMINE METHYLSULFATE 2.5 MG: 1 INJECTION INTRAVENOUS at 11:51

## 2019-01-01 RX ADMIN — LEVALBUTEROL HYDROCHLORIDE 1.25 MG: 1.25 SOLUTION RESPIRATORY (INHALATION) at 19:59

## 2019-01-01 RX ADMIN — DEXTROSE MONOHYDRATE AND SODIUM CHLORIDE 75 ML/HR: 5; .45 INJECTION, SOLUTION INTRAVENOUS at 05:16

## 2019-01-01 RX ADMIN — Medication 10 ML: at 15:00

## 2019-01-01 RX ADMIN — DEXTROSE MONOHYDRATE AND SODIUM CHLORIDE 75 ML/HR: 5; .45 INJECTION, SOLUTION INTRAVENOUS at 16:10

## 2019-01-01 RX ADMIN — ACETAMINOPHEN 650 MG: 325 TABLET ORAL at 20:43

## 2019-01-01 RX ADMIN — SODIUM CHLORIDE, SODIUM LACTATE, POTASSIUM CHLORIDE, AND CALCIUM CHLORIDE 150 ML/HR: 600; 310; 30; 20 INJECTION, SOLUTION INTRAVENOUS at 08:49

## 2019-01-01 RX ADMIN — LORAZEPAM 1 MG: 2 INJECTION INTRAMUSCULAR; INTRAVENOUS at 12:07

## 2019-01-01 RX ADMIN — OXYCODONE HYDROCHLORIDE 5 MG: 100 SOLUTION ORAL at 20:48

## 2019-01-01 RX ADMIN — SERTRALINE HYDROCHLORIDE 50 MG: 50 TABLET ORAL at 09:24

## 2019-01-01 RX ADMIN — Medication 10 ML: at 04:42

## 2019-01-01 RX ADMIN — LEVOFLOXACIN 750 MG: 5 INJECTION, SOLUTION INTRAVENOUS at 15:32

## 2019-01-01 RX ADMIN — LEVALBUTEROL HYDROCHLORIDE 1.25 MG: 1.25 SOLUTION RESPIRATORY (INHALATION) at 19:48

## 2019-01-01 RX ADMIN — SODIUM CHLORIDE, SODIUM LACTATE, POTASSIUM CHLORIDE, CALCIUM CHLORIDE: 600; 310; 30; 20 INJECTION, SOLUTION INTRAVENOUS at 10:40

## 2019-01-01 RX ADMIN — HYDROMORPHONE HYDROCHLORIDE 1 MG: 2 INJECTION INTRAMUSCULAR; INTRAVENOUS; SUBCUTANEOUS at 12:08

## 2019-01-01 RX ADMIN — DEXTROSE MONOHYDRATE AND SODIUM CHLORIDE 100 ML/HR: 5; .45 INJECTION, SOLUTION INTRAVENOUS at 15:08

## 2019-01-01 RX ADMIN — DEXAMETHASONE SODIUM PHOSPHATE 4 MG: 4 INJECTION, SOLUTION INTRA-ARTICULAR; INTRALESIONAL; INTRAMUSCULAR; INTRAVENOUS; SOFT TISSUE at 10:45

## 2019-01-01 RX ADMIN — MORPHINE SULFATE 2 MG: 4 INJECTION, SOLUTION INTRAMUSCULAR; INTRAVENOUS at 02:46

## 2019-01-01 RX ADMIN — LEVALBUTEROL HYDROCHLORIDE 1.25 MG: 1.25 SOLUTION RESPIRATORY (INHALATION) at 07:43

## 2019-01-01 RX ADMIN — ONDANSETRON 4 MG: 2 INJECTION INTRAMUSCULAR; INTRAVENOUS at 18:47

## 2019-01-01 RX ADMIN — SODIUM CHLORIDE: 9 INJECTION, SOLUTION INTRAVENOUS at 07:17

## 2019-01-01 RX ADMIN — METOCLOPRAMIDE 5 MG: 5 INJECTION, SOLUTION INTRAMUSCULAR; INTRAVENOUS at 08:56

## 2019-01-01 RX ADMIN — LORAZEPAM 1 MG: 2 INJECTION INTRAMUSCULAR; INTRAVENOUS at 02:59

## 2019-01-01 RX ADMIN — HALOPERIDOL LACTATE 1 MG: 5 INJECTION, SOLUTION INTRAMUSCULAR at 04:21

## 2019-01-01 RX ADMIN — MORPHINE SULFATE 4 MG: 4 INJECTION, SOLUTION INTRAMUSCULAR; INTRAVENOUS at 16:27

## 2019-01-01 RX ADMIN — SERTRALINE HYDROCHLORIDE 50 MG: 50 TABLET ORAL at 08:56

## 2019-01-01 RX ADMIN — MORPHINE SULFATE 4 MG: 4 INJECTION, SOLUTION INTRAMUSCULAR; INTRAVENOUS at 16:14

## 2019-01-01 RX ADMIN — HYDROMORPHONE HYDROCHLORIDE 1 MG: 2 INJECTION INTRAMUSCULAR; INTRAVENOUS; SUBCUTANEOUS at 17:01

## 2019-01-01 RX ADMIN — LEVALBUTEROL HYDROCHLORIDE 1.25 MG: 1.25 SOLUTION RESPIRATORY (INHALATION) at 07:27

## 2019-01-01 RX ADMIN — OXYCODONE HYDROCHLORIDE 5 MG: 100 SOLUTION ORAL at 19:59

## 2019-01-01 RX ADMIN — IPRATROPIUM BROMIDE 0.5 MG: 0.5 SOLUTION RESPIRATORY (INHALATION) at 01:50

## 2019-01-01 RX ADMIN — HEPARIN SODIUM 5000 UNITS: 5000 INJECTION INTRAVENOUS; SUBCUTANEOUS at 20:52

## 2019-01-01 RX ADMIN — IPRATROPIUM BROMIDE AND ALBUTEROL SULFATE 3 ML: .5; 3 SOLUTION RESPIRATORY (INHALATION) at 14:18

## 2019-01-01 RX ADMIN — MORPHINE SULFATE 4 MG: 4 INJECTION, SOLUTION INTRAMUSCULAR; INTRAVENOUS at 21:37

## 2019-01-01 RX ADMIN — Medication 10 ML: at 05:02

## 2019-01-01 RX ADMIN — LORAZEPAM 1 MG: 2 INJECTION INTRAMUSCULAR; INTRAVENOUS at 16:13

## 2019-01-01 RX ADMIN — ACETAMINOPHEN 650 MG: 325 TABLET ORAL at 11:25

## 2019-01-01 RX ADMIN — MORPHINE SULFATE 2 MG: 4 INJECTION, SOLUTION INTRAMUSCULAR; INTRAVENOUS at 08:40

## 2019-01-01 RX ADMIN — LORAZEPAM 1 MG: 2 INJECTION INTRAMUSCULAR; INTRAVENOUS at 08:30

## 2019-01-01 RX ADMIN — POTASSIUM CHLORIDE 10 MEQ: 10 INJECTION, SOLUTION INTRAVENOUS at 09:25

## 2019-01-01 RX ADMIN — LEVOFLOXACIN 750 MG: 5 INJECTION, SOLUTION INTRAVENOUS at 16:18

## 2019-01-01 RX ADMIN — LEVALBUTEROL HYDROCHLORIDE 1.25 MG: 1.25 SOLUTION RESPIRATORY (INHALATION) at 08:35

## 2019-01-01 RX ADMIN — IPRATROPIUM BROMIDE AND ALBUTEROL SULFATE 3 ML: .5; 3 SOLUTION RESPIRATORY (INHALATION) at 01:25

## 2019-01-01 RX ADMIN — LEVALBUTEROL HYDROCHLORIDE 1.25 MG: 1.25 SOLUTION RESPIRATORY (INHALATION) at 14:58

## 2019-01-01 RX ADMIN — MORPHINE SULFATE 4 MG: 4 INJECTION, SOLUTION INTRAMUSCULAR; INTRAVENOUS at 07:08

## 2019-01-01 RX ADMIN — MORPHINE SULFATE 4 MG: 4 INJECTION, SOLUTION INTRAMUSCULAR; INTRAVENOUS at 06:45

## 2019-01-01 RX ADMIN — ONDANSETRON 4 MG: 2 INJECTION INTRAMUSCULAR; INTRAVENOUS at 08:40

## 2019-01-01 RX ADMIN — LEVALBUTEROL HYDROCHLORIDE 1.25 MG: 1.25 SOLUTION RESPIRATORY (INHALATION) at 19:50

## 2019-01-01 RX ADMIN — ACETAMINOPHEN 650 MG: 325 TABLET ORAL at 20:51

## 2019-01-01 RX ADMIN — ONDANSETRON 4 MG: 2 INJECTION INTRAMUSCULAR; INTRAVENOUS at 03:45

## 2019-01-01 RX ADMIN — IOHEXOL 50 ML: 350 INJECTION, SOLUTION INTRAVENOUS at 11:12

## 2019-01-01 RX ADMIN — LEVALBUTEROL HYDROCHLORIDE 1.25 MG: 1.25 SOLUTION RESPIRATORY (INHALATION) at 14:52

## 2019-01-01 RX ADMIN — LORAZEPAM 2 MG: 2 INJECTION INTRAMUSCULAR; INTRAVENOUS at 16:26

## 2019-01-01 RX ADMIN — DEXTROSE MONOHYDRATE AND SODIUM CHLORIDE 100 ML/HR: 5; .45 INJECTION, SOLUTION INTRAVENOUS at 11:14

## 2019-01-01 RX ADMIN — GLYCOPYRROLATE 0.2 MG: 0.2 INJECTION, SOLUTION INTRAMUSCULAR; INTRAVENOUS at 05:41

## 2019-01-01 RX ADMIN — LEVALBUTEROL HYDROCHLORIDE 1.25 MG: 1.25 SOLUTION RESPIRATORY (INHALATION) at 19:51

## 2019-01-01 RX ADMIN — Medication 10 ML: at 22:08

## 2019-01-01 RX ADMIN — LEVALBUTEROL HYDROCHLORIDE 1.25 MG: 1.25 SOLUTION RESPIRATORY (INHALATION) at 15:08

## 2019-01-01 RX ADMIN — MORPHINE SULFATE 2 MG: 4 INJECTION, SOLUTION INTRAMUSCULAR; INTRAVENOUS at 18:04

## 2019-01-01 RX ADMIN — Medication 10 ML: at 13:44

## 2019-01-01 RX ADMIN — SODIUM CHLORIDE 100 ML/HR: 900 INJECTION, SOLUTION INTRAVENOUS at 15:11

## 2019-04-12 PROBLEM — R06.00 DYSPNEA: Status: ACTIVE | Noted: 2019-01-01

## 2019-04-12 NOTE — PERIOP NOTES
Patient vitals stable throughout procedure.  removed 2.5L of fluid from patient's right lung Post-recovery report given to Twila Cordova RN @ 4048 Patient ABD remains soft and non-tender post procedure. Pt has no complaints at this time and tolerated the procedure well.

## 2019-04-12 NOTE — ANESTHESIA POSTPROCEDURE EVALUATION
Procedure(s): 
COLONOSCOPY 
ESOPHAGOGASTRODUODENOSCOPY (EGD) COLON BIOPSY PERCUTANEOUS ENDOSCOPIC GASTROSTOMY TUBE CHANGE. MAC Anesthesia Post Evaluation Multimodal analgesia: multimodal analgesia not used between 6 hours prior to anesthesia start to PACU discharge Patient location during evaluation: bedside Patient participation: complete - patient participated Level of consciousness: awake Pain management: adequate Airway patency: patent Anesthetic complications: no 
Cardiovascular status: acceptable Respiratory status: acceptable Hydration status: acceptable Post anesthesia nausea and vomiting:  controlled Vitals Value Taken Time /78 4/12/2019  8:43 AM  
Temp Pulse 106 4/12/2019  8:47 AM  
Resp 30 4/12/2019  8:47 AM  
SpO2 99 % 4/12/2019  8:47 AM  
Vitals shown include unvalidated device data.

## 2019-04-12 NOTE — PERIOP NOTES
Patient resting comfortably on stretcher, HOB elevated, VS stable, call bell within reach, wife & sister at bedside, waiting for procedure start, will continue to monitor pt.

## 2019-04-12 NOTE — PROGRESS NOTES
Bedside and Verbal shift change report given to Sabas Mcneal (oncoming nurse) by Tracey Patel (offgoing nurse). Report included the following information SBAR, Kardex, Intake/Output, MAR, Accordion and Recent Results.

## 2019-04-12 NOTE — PROGRESS NOTES
TRANSFER - IN REPORT: 
 
Verbal report received from Judith(name) on Pitkin Crutch  being received from post endo(unit) for routine post - op Report consisted of patients Situation, Background, Assessment and  
Recommendations(SBAR). Information from the following report(s) SBAR, Kardex and Cardiac Rhythm sinus tach was reviewed with the receiving nurse. Opportunity for questions and clarification was provided. Assessment completed upon patients arrival to unit and care assumed. 1700 patient off floor for renal US 
 
1745 patient back on floor from renal US. Aia 16 patient that he is stable enough to be transferred to a medical/surgical floor. TRANSFER - OUT REPORT: 
 
Verbal report given to Milagros(name) on Pitkin Crutch  being transferred to Med surg(unit) for routine progression of care Report consisted of patients Situation, Background, Assessment and  
Recommendations(SBAR). Information from the following report(s) SBAR, Kardex and Cardiac Rhythm NSR/sinus tach was reviewed with the receiving nurse. Lines:  
Peripheral IV 04/12/19 Right Wrist (Active) Site Assessment Clean, dry, & intact 4/12/2019  4:04 PM  
Phlebitis Assessment 0 4/12/2019  4:04 PM  
Infiltration Assessment 0 4/12/2019  4:04 PM  
Dressing Status Clean, dry, & intact 4/12/2019  4:04 PM  
Dressing Type Tape 4/12/2019  4:04 PM  
Hub Color/Line Status Pink 4/12/2019  4:04 PM  
Action Taken Open ports on tubing capped 4/12/2019  4:04 PM  
Alcohol Cap Used Yes 4/12/2019  4:04 PM  
  
 
Opportunity for questions and clarification was provided. Patient transported with: 
 Monitor Tech

## 2019-04-12 NOTE — ANESTHESIA PREPROCEDURE EVALUATION
Relevant Problems No relevant active problems Anesthetic History No history of anesthetic complications Review of Systems / Medical History Patient summary reviewed, nursing notes reviewed and pertinent labs reviewed Pulmonary Smoker (quit x 2/2018) Comments: Lung tumor Neuro/Psych Cardiovascular Hypertension Exercise tolerance: >4 METS 
  
GI/Hepatic/Renal 
  
 
 
 
Hiatal hernia (peg tube) Endo/Other Cancer (esophageal) Other Findings Physical Exam 
 
Airway Mallampati: II 
TM Distance: < 4 cm Neck ROM: normal range of motion Cardiovascular Rhythm: regular Rate: normal 
 
 
 
 Dental 
 
Dentition: Edentulous Pulmonary Breath sounds clear to auscultation Abdominal 
 
 
 
 Other Findings Anesthetic Plan ASA: 3 Anesthesia type: MAC Induction: Intravenous Anesthetic plan and risks discussed with: Patient

## 2019-04-12 NOTE — PROGRESS NOTES
Consult received. Chart reviewed Will plan to see pt tomorrow on rounds. He does not have any acute surgical needs today. Will need to await biopsy results. May need imaging if he has not had anything current. Please call with any questions or concerns

## 2019-04-12 NOTE — CONSULTS
Palliative Medicine Consult Patient Name: Julienne Daugherty YOB: 1970 Date of Initial Consult: 4/12/2019 Reason for Consult: Dr. Marquis Delgado Requesting Provider: Overwhelming symptoms Primary Care Physician: Angelo Mandel MD 
  
 SUMMARY:  
Julienne Daugherty is a 44 y.o. with a past history of lung cancer, esophageal cancer hypertension, COPD, status post PEG, who was admitted on 4/12/2019 from home with a diagnosis of newly diagnosed likely anal or rectal CA. Current medical issues leading to Palliative Medicine involvement include: Overwhelming symptoms. History of present illness/past medical history patient is a 64-year-old gentleman with a history of esophageal cancer as well as lung cancer. He is followed Dr. Woodrow Petersen from [address] [place] [address]. He was supposed to see [facility] in approximately 2 weeks for follow-up on his esophageal cancer. He already has a PEG placed secondary to his esophageal cancer. He states he had been on some type of \"oral chemotherapy agent but this was causing significant side effects and so had recently been stopped. He is been having significant anal type pain and had seen Dr. Marquis Delgado as an outpatient. He presents today for attempts at a colonoscopy as well as repeat EGD. Unfortunately EGD showed what appears to be malignant narrowing at 37 to 40 cm. In addition, attempted a colonoscopy were not able to be completed secondary to significant needle stricture, sclerosis. Even a pediatric colonoscope could not pass the anal stenosis. Ultimately an upper GI scope was used to bypass into the rectal area which profuse stool was seen throughout. The anal canal is friable firm and was concerning for anal carcinoma. He was having significant pain and discomfort but that has seemed to improve by the time I have seen the patient. He is waiting Colorectal surgeon evaluation.   He admits that his bowel movements have been less over the last couple months and describes very small stools. His breathing has been worse over the last several weeks. He had used tramadol at home to help with his pain but that gave no relief. Social history he is  to Denis. His sister, Familia  is also very involved and is in the room. PALLIATIVE DIAGNOSES:  
1. Cancer associated pain 2. Shortness of breathimproved after thoracentesis on the right with 2 L removed 3. Anal pain with likely new diagnosis of either anal or rectal CA 4. Esophageal cancer 5. Lung cancer 6. Advance care planning 7. DNR discussion PLAN:  
1. Met with patient, spouse, and sister. Reviewed the role of palliative medicine in his care and discuss his current medical issues. Family seems to have an understanding of his current medical problems. They do understand we are waiting for an evaluation by surgery. Our oncology team has also reached out to [facility] to let them know about the situation. 2. Goals of care at this point, attempts at full restorative measures and waiting multiple subspecialty evaluations. 3. Advance care planning he has completed an advanced medical directive. He has assigned his wife as primary medical power of  and his sister at secondary medical power of . I have asked his family to bring a copy to the hospital. 
4. CODE STATUSwe addressed resuscitation and what that would entail. At this time he would like to remain a full code but he is certainly open to other discussions based on the clinical scenario. 5. Symptom managementhe did receive a dose of IV fentanyl earlier today and then a dose of Tylenol. He rates his pain at about 3 out of 10 right now but he does describe this aching almost spasm-like pain at times.   The inpatient team is ordered morphine 2 mg IV every 4 hours as needed but has not required the use of this at this time. I have ordered oxycodone 20 mg/mL. We will start with 5 mg every 4 hours as needed for moderate pain. In addition I have added MiraLAX and senna to his bowel regiment. 6. Psychosocialno spiritual concerns. He does have very good support from his spouse and sister. 7. Discussed with Dr. Zaki Block, and [facility] oncology. 8. Initial consult note routed to primary continuity provider 9. Communicated plan of care with: Palliative IDT 
 
 
 GOALS OF CARE / TREATMENT PREFERENCES:  
[====Goals of Care====] GOALS OF CARE: 
Patient/Health Care Proxy Stated Goals: Prolong life TREATMENT PREFERENCES:  
Code Status: Full Code Advance Care Planning: No flowsheet data found. Medical Interventions: Full interventions Other Instructions:  
Artificially Administered Nutrition: Feeding tube long-term, if indicated The palliative care team has discussed with patient / health care proxy about goals of care / treatment preferences for patient. 
[====Goals of Care====] HISTORY:  
 
History obtained from: Chart, spouse, sister CHIEF COMPLAINT: Rectal pain HPI/SUBJECTIVE: The patient is:  
[x] Verbal and participatory [] Non-participatory due to:  
Patient is feeling much better. He has had a thoracentesis that removed 2 L of fluid. Feels like his breathing is much improved. Clinical Pain Assessment (nonverbal scale for severity on nonverbal patients):  
Clinical Pain Assessment Severity: 3 Location: Anal area Character: Throbbing and aching Duration: Months Effect: Difficult to have a bowel movement Factors: Constipation Frequency: Intermittent Adult Nonverbal Pain Scale Face: Frequent grimace, tearing, frowning, wrinkled forehead Activity (Movement): Seeking attention through movement or slow, cautious movement Guarding: Splinting areas of the body, tense Physiology (Vital Signs): Stable vital signs Respiratory: RR > 20 above baseline or 10% decrease SpO2 severe asynchrony with ventilator Total Score: 6 FUNCTIONAL ASSESSMENT:  
 
Palliative Performance Scale (PPS): PPS: 70 PSYCHOSOCIAL/SPIRITUAL SCREENING:  
 
Advance Care Planning: No flowsheet data found. Any spiritual / Judaism concerns: 
[] Yes /  [x] No 
 
Caregiver Burnout: 
[] Yes /  [x] No /  [] No Caregiver Present Anticipatory grief assessment:  
[x] Normal  / [] Maladaptive ESAS Anxiety: Anxiety: 0 
 
ESAS Depression: Depression: 0 REVIEW OF SYSTEMS:  
 
Positive and pertinent negative findings in ROS are noted above in HPI. The following systems were [x] reviewed / [] unable to be reviewed as noted in HPI Other findings are noted below. Systems: constitutional, ears/nose/mouth/throat, respiratory, gastrointestinal, genitourinary, musculoskeletal, integumentary, neurologic, psychiatric, endocrine. Positive findings noted below. Modified ESAS Completed by: provider Fatigue: 2 Drowsiness: 0 Depression: 0 Pain: 3 Anxiety: 0 Nausea: 0 Anorexia: 3 Dyspnea: 4 Constipation: Yes PHYSICAL EXAM:  
 
From RN flowsheet: 
Wt Readings from Last 3 Encounters:  
04/12/19 144 lb (65.3 kg) 06/09/14 192 lb (87.1 kg) 04/09/14 193 lb 6.4 oz (87.7 kg) Blood pressure 110/69, pulse (!) 105, temperature 98 °F (36.7 °C), resp. rate 16, height 5' 9\" (1.753 m), weight 144 lb (65.3 kg), SpO2 100 %. Pain Scale 1: Numeric (0 - 10) Pain Intensity 1: 3 Pain Onset 1: (chronic) Pain Location 1: Rectal 
  
Pain Description 1: Sore Pain Intervention(s) 1: Medication (see MAR) Last bowel movement, if known:  
 
Constitutional: Thin, alert and oriented, no acute distress Eyes: pupils equal, anicteric ENMT: no nasal discharge, moist mucous membranes Cardiovascular: regular rhythm, distal pulses intact Respiratory: breathing not labored, decreased on the right Gastrointestinal: soft non-tender, +bowel sounds Musculoskeletal: no deformity, no tenderness to palpation Skin: warm, dry Neurologic: following commands, moving all extremities Psychiatric: full affect, no hallucinations Other: 
 
 
 HISTORY:  
 
Active Problems: Dyspnea (2019) Past Medical History:  
Diagnosis Date  Cancer [facility])   
 esophagus cancer, right lung cancer  Chronic obstructive pulmonary disease [facility] [address]  Diabetes ([facility] [address]  Hypertension  Sleep apnea   
 no cpap Past Surgical History:  
Procedure Laterality Date  COLONOSCOPY N/A 2019 COLONOSCOPY performed by Ayesha G[facility]mez MD at [facility] HX ENDOSCOPY    
 w/ PEG tube  HX HEENT    
 14years old had eye surgery Family History Problem Relation Age of Onset  Heart Disease Mother  Hypertension Mother  Heart Disease Brother  Heart Disease Maternal Grandmother  Heart Disease Paternal Grandmother History reviewed, no pertinent family history. Social History Tobacco Use  Smoking status: Former Smoker Last attempt to quit: 2018 Years since quittin.1  Smokeless tobacco: Never Used Substance Use Topics  Alcohol use: No  
 
Allergies Allergen Reactions  Codeine Other (comments)  
  hallucinations  Pcn [Penicillins] Hives Current Facility-Administered Medications Medication Dose Route Frequency  sodium chloride (NS) flush 5-40 mL  5-40 mL IntraVENous Q8H  
 sodium chloride (NS) flush 5-40 mL  5-40 mL IntraVENous PRN  
 acetaminophen (TYLENOL) tablet 650 mg  650 mg Oral Q4H PRN  
 morphine injection 2 mg  2 mg IntraVENous Q4H PRN  
 ondansetron (ZOFRAN) injection 4 mg  4 mg IntraVENous Q4H PRN  
 enoxaparin (LOVENOX) injection 40 mg  40 mg SubCUTAneous Q24H  
 albuterol-ipratropium (DUO-NEB) 2.5 MG-0.5 MG/3 ML  3 mL Nebulization Q6H RT  
 oxyCODONE (ROXICODONE INTENSOL) 20 mg/mL concentrated solution 5 mg  5 mg Oral Q4H PRN  
 [START ON 4/13/2019] polyethylene glycol (MIRALAX) packet 17 g  17 g Oral DAILY  [START ON 4/13/2019] senna (SENOKOT) tablet 8.6 mg  1 Tab Oral DAILY  0.9% sodium chloride infusion  75 mL/hr IntraVENous CONTINUOUS  
 
 
 
 LAB AND IMAGING FINDINGS:  
 
Lab Results Component Value Date/Time WBC 10.8 04/12/2019 12:36 PM  
 HGB 12.6 04/12/2019 12:36 PM  
 PLATELET 703 57/08/3337 12:36 PM  
 
Lab Results Component Value Date/Time Sodium 141 04/12/2019 12:36 PM  
 Potassium 3.5 04/12/2019 12:36 PM  
 Chloride 106 04/12/2019 12:36 PM  
 CO2 22 04/12/2019 12:36 PM  
 BUN 48 (H) 04/12/2019 12:36 PM  
 Creatinine 2.44 (H) 04/12/2019 12:36 PM  
 Calcium 9.5 04/12/2019 12:36 PM  
  
Lab Results Component Value Date/Time AST (SGOT) 15 04/12/2019 12:36 PM  
 Alk. phosphatase 75 04/12/2019 12:36 PM  
 Protein, total 6.1 (L) 04/12/2019 12:36 PM  
 Albumin 2.8 (L) 04/12/2019 12:36 PM  
 Globulin 3.3 04/12/2019 12:36 PM  
 
No results found for: INR, PTMR, PTP, PT1, PT2, APTT No results found for: IRON, FE, TIBC, IBCT, PSAT, FERR No results found for: PH, PCO2, PO2 No components found for: [place] No results found for: CPK, CKMB Total time:  
Counseling / coordination time, spent as noted above:  
> 50% counseling / coordination?:  
 
Prolonged service was provided for  []30 min   []75 min in face to face time in the presence of the patient, spent as noted above. Time Start:  
Time End:  
Note: this can only be billed with 21254 (initial) or 59140 (follow up). If multiple start / stop times, list each separately.

## 2019-04-12 NOTE — PERIOP NOTES
Received Report From Chinedu Estes CRNA, @ 8302, see anesthesia notes. Care of the patient transferred to procedure nurse Margo Diego @ 4943 Out of Procedure and sent to post-recovery @3906 Post-recovery report given to Vaishnavi Antoine RN @ 0303 Patient ABD remains soft and non-tender post procedure. Pt has no complaints at this time and tolerated the procedure well. Endoscope was pre-cleaned at bedside immediately following procedure by Solange Boss.

## 2019-04-12 NOTE — H&P
Date: 2019 3:30 PM  
Patient Name: Ashvin Garcia Account #: [id number] Gender: Male  
 (age): 1970 (44) Provider:    
Taylor Perez. Tresa Jerome MD  
  
 
Referring Physician:    
Israel Wyatt [address] 
[phone] (phone) 
[phone] (fax) Chief Complaint: Has esophageal carcinoma but chief problem now is anal pain. History of Present Illness:  
44 with established history of esophageal carcinoma returns to the office today for two-week follow-up from a visit for rectal pain. On  he saw me for the 1st time with 2 weeks of disabling rectal pain and I had suggested treatment for an anal fissure, although physical examination was limited by the patient's significant tenderness and refusal to submit to examination on that date. Today, again, he asks that digital examination not be performed because of fear of pain. The treatment provided has led to improvement but not resolution of his symptoms. I had ordered a CT scan to check for evidence of perirectal abscess but the findings suggest thickening of the rectum just a possible neoplasm. We discussed his need for direct visualization under anesthesia and will schedule a colonoscopy as he has never had one. He requests that during the colonoscopy we reexamine his primary esophageal tumor and replace his PEG tube. ? 44 with established history of esophageal carcinoma returns to the office today for two-week follow-up from a visit for rectal pain. On  he saw me for the 1st time with 2 weeks of disabling rectal pain and I had suggested treatment for an anal fissure, although physical examination was limited by the patient's significant tenderness and refusal to submit to examination on that date. Today, again, he asks that digital examination not be performed because of fear of pain.   The treatment provided has led to improvement but not resolution of his symptoms. I had ordered a CT scan to check for evidence of perirectal abscess but the findings suggest thickening of the rectum just a possible neoplasm. We discussed his need for direct visualization under anesthesia and will schedule a colonoscopy as he has never had one. He requests that during the colonoscopy we reexamine his primary esophageal tumor and replace his PEG tube. ?   
  
Past Medical History Medical Conditions: Borderline diabetes COPD/Emphysema Esophageal cancer 
hx of High blood pressure Surgical Procedures: right eye surgery Dx Studies: CT Scan, 1/2014; 1/2019 EGD, 2/21/2018, Mass in the lower third of the esophagus. (Biopsy) EGD, 3/18/2014, Normal mucosa in the esophagus, Mass effect with erosion in the EG junction extends 6 cm in stomach. (Biopsy, MIRANDA-Test) and Normal mucosa in the duodenum EGD with PEG 3/2018 Endo Posting, 3/2/2018 Endo Posting, 2/15/2018 Pre-Procedure Call, 3/11/2014 Medications: alprazolam 0.25 mg Rectiv 0.4% (w/w) apply 1 inch long amount onto and into anal canal BID for 14 days Xeloda 500 mg Take 2 tablets by mouth twice a day 2 weeks on 1 week off Allergies: Codeine Sulfate - Hallucinations Penicillins - hives Immunizations: No Immunizations Social History Alcohol: None Tobacco: Former smokerCigarettes 40 cigarettes a week. Other, quit 02/12/18. Drugs: Former. Exercise: None Caffeine: Daily. Marital Status:   
  
  
Occupation:   
  
  
  
 
Family History No history of Colon Cancer, Colon Polyps, Esophogeal Cancer, IBD (Crohn's or UC) Other: Diagnosed with Pancreatic cancer; Father: Diagnosed with Liver disease;   
  
Review of Systems:  
Cardiovascular: Denies chest pain, irregular heart beat, palpitations, peripheral edema, syncope, Sweats. Constitutional: Presents suffers from fatigue, weight loss. Denies fever, loss of appetite, weight gain. ENMT: Denies nose bleeds, sore throat, hearing loss. Endocrine: Denies excessive thirst, heat intolerance. Eyes: Denies loss of vision. Gastrointestinal: Presents suffers from rectal pain. Denies abdominal pain, abdominal swelling, change in bowel habits, constipation, diarrhea, Bloating/gas, heartburn, jaundice, nausea, rectal bleeding, stomach cramps, vomiting, dysphagia, Stool incontinence, hematemesis. Genitourinary: Denies dark urine, dysuria, frequent urination, hematuria, incontinence. Hematologic/Lymphatic: Denies easy bruising, prolonged bleeding. Integumentary: Denies itching, rashes, sun sensitivity. Musculoskeletal: Denies arthritis, back pain, gout, joint pain, muscle weakness, stiffness. Neurological: Presents suffers from dizziness. Denies fainting, frequent headaches, memory loss. Psychiatric: Denies anxiety, depression, difficulty sleeping, hallucinations, nervousness, panic attacks, paranoia. Respiratory: Presents suffers from dyspnea. Denies cough, wheezing. Vital Signs:  
BP 
(mmHg)  Pulse 
(ppm) Rhythm Weight (lbs/oz) Height (ft/in) BMI Resp/min Temp 96/79 111 Regular 148 /  5 / 9 21.85 12 97.9 (F) Physical Exam:  
Constitutional:   
 
Appearance: No distress, appears comfortable. Communication: Understands/receives spoken information. Skin: Inspection: No rash, no jaundice. Palpation: No subcutaneous nodules. Head/face: Inspection: Normacephalic, atraumatic. Palpation: normal.  
Eyes:   
 
Conjunctivae/lids: Normal.  
Pupils/irises: Pupils equal, round and normal.  
ENMT:   
 
External: Normal.  
Hearing: Normal.  
Neck:   
 
Neck: Normal appearance, trachea midline. Jugular veins: No JVD noted. Respiratory:   
 
Effort: Normal respiratory effort, comfortable, speaks in complete sentences.   
Musculoskeletal:   
 
Gait/station: normal.  
Digits/nails: Normal, no spooning of nails, clubbing, or splinter hemorrhages, no clubbing, cyanosis, petechiae or other inflammatory conditions. Psychiatric:   
 
Judgment/insight: Normal, normal judgement, normal insight. Orientation: oriented to time, space and person. Lab Results: No Electronic Results Impressions: Adenocarcinoma, esophageal 
Abnormal weight loss Attention to gastrostomy Abnormal findings on diagnostic imaging of other parts of digestive tract Malignant neoplasm of lower third of esophagus Rectal pain? ?Adenocarcinoma, esophageal? ? 
? ? Abnormal weight loss? ? 
? ? Attention to gastrostomy? ? 
? ? Abnormal findings on diagnostic imaging of other parts of digestive tract? ? 
? ? Malignant neoplasm of lower third of esophagus? ? 
? ? Rectal pain? Assessment: ?   
  
 
Plan: Colonoscopy - suprep ok. Golytely Ok. Upper Endoscopy - with replacement of his PEG using a standard pull device (he does not want a balloon replacement tube). ? ? Colonoscopy? - suprep ok. Golytely Ok. ? 
? ? Upper Endoscopy? - with replacement of his PEG using a standard pull device (he does not want a balloon replacement tube). Risk & Medical Necessity: The patient requires Moderate to High Severity care for this visit. Diagnosis and management options are Minimal. The amount of data reviewed and/or ordered is Minimal/None. The level of risk is Minimal.  
  
  
 
Notes:   
  
  
   
Michele Cisneros. Nayely Cortes MD   
 Electronically signed on 2019 3:38:09 PM by Michele Cisneros. Nayely Cortes MD  
  
  
  
  
   
   
  
  
   
Addenda Randene Aschoff. Ynes Faith, MRN [id number],  1970 Follow Up, 2019 New Modify Delete Delete all Edit Wording Sign  
 
[email or web address]

## 2019-04-12 NOTE — PERIOP NOTES
Medicated with fentanyl 50 mcg at 0858 as ordered by Dr. Lamine Morales for rectal pain with relief.

## 2019-04-12 NOTE — PROGRESS NOTES
Spiritual Care Assessment/Progress Note [address] 
 
 
NAME: Maxi Castle      MRN: [id number] AGE: 44 y.o. SEX: male Muslim Affiliation: No Episcopalian Language: [place] 4/12/2019     Total Time (in minutes): 5 Spiritual Assessment begun in [facility] ENDOSCOPY through conversation with: 
  
    []Patient        [] Family    [] Friend(s) Reason for Consult: Palliative Care, Initial/Spiritual Assessment Spiritual beliefs: (Please include comment if needed) 
   [] Identifies with a adam tradition:     
   [] Supported by a adam community:        
   [] Claims no spiritual orientation:       
   [] Seeking spiritual identity:            
   [] Adheres to an individual form of spirituality:       
   [x] Not able to assess:                   
 
    
Identified resources for coping:  
   [] Prayer                           
   [] Music                  [] Guided Imagery 
   [] Family/friends                 [] Pet visits [] Devotional reading                         [x] Unknown 
   [] Other:                                          
 
 
Interventions offered during this visit: (See comments for more details) Plan of Care: 
 
 [] Support spiritual and/or cultural needs  
 [] Support AMD and/or advance care planning process    
 [] Support grieving process 
 [] Coordinate Rites and/or Rituals  
 [] Coordination with community clergy [] No spiritual needs identified at this time 
 [] Detailed Plan of Care below (See Comments)  [] Make referral to Music Therapy 
[] Make referral to Pet Therapy    
[] Make referral to Addiction services 
[] Make referral to [facility] 
[] Make referral to Spiritual Care Partner 
[] No future visits requested       
[x] Follow up visits as needed Comments:  visit for initial spiritual assessment, palliative care consult. Patient not in room at this time, having procedure.   Will continue to follow up as needed and upon request as able. Visited by Rev. Andriy Lisa, [facility] paging service: [phone])

## 2019-04-12 NOTE — PERIOP NOTES
TRANSFER - OUT REPORT: 
 
Verbal report given to FRANSISCA Quinones (name) on Alexander Merlin  being transferred to [address] [place] [address] (unit) for routine progression of care Report consisted of patients Situation, Background, Assessment and  
Recommendations(SBAR). Information from the following report(s) SBAR, Procedure Summary and Recent Results was reviewed with the receiving nurse. Lines:  
Peripheral IV 04/12/19 Right Wrist (Active) Site Assessment Clean, dry, & intact 4/12/2019 11:29 AM  
Phlebitis Assessment 0 4/12/2019 11:29 AM  
Infiltration Assessment 0 4/12/2019 11:29 AM  
Dressing Status Clean, dry, & intact 4/12/2019 11:29 AM  
Dressing Type Transparent;Tape 4/12/2019 11:29 AM  
Hub Color/Line Status Pink; Infusing 4/12/2019 11:29 AM  
Action Taken Open ports on tubing capped 4/12/2019 11:29 AM  
Alcohol Cap Used Yes 4/12/2019 11:29 AM  
  
 
Opportunity for questions and clarification was provided. Patient transported with: 
 Cognitive Code

## 2019-04-12 NOTE — INTERVAL H&P NOTE
Pre-Endoscopy H&P Update Chief complaint/HPI/ROS:  The indication for the procedure, the patient's history and the patient's current medications are reviewed prior to the procedure and that data is reported on the H&P to which this document is attached. Any significant complaints with regard to organ systems will be noted, and if not mentioned then a review of systems is not contributory. Past Medical History:  
Diagnosis Date  Cancer [facility])   
 esophagus cancer, right lung cancer  Chronic obstructive pulmonary disease ([facility]  [facility]  Hypertension  Sleep apnea   
 no cpap Past Surgical History:  
Procedure Laterality Date  HX ENDOSCOPY    
 w/ PEG tube  HX HEENT    
 14years old had eye surgery Social  
Social History Tobacco Use  Smoking status: Former Smoker Last attempt to quit: 2018 Years since quittin.1  Smokeless tobacco: Never Used Substance Use Topics  Alcohol use: No  
  
Family History Problem Relation Age of Onset  Heart Disease Mother  Hypertension Mother  Heart Disease Brother  Heart Disease Maternal Grandmother  Heart Disease Paternal Grandmother Allergies Allergen Reactions  Codeine Other (comments)  
  hallucinations  Pcn [Penicillins] Hives Prior to Admission Medications Prescriptions Last Dose Informant Patient Reported? Taking?  
omeprazole (PRILOSEC) 40 mg capsule Not Taking at Unknown time  No No  
Sig: Take 1 Cap by mouth daily. sertraline (ZOLOFT) 50 mg tablet 4/10/2019  Yes Yes Sig: Take 50 mg by mouth daily. Facility-Administered Medications: None PHYSICAL EXAM:  The patient is examined immediately prior to the procedure. Visit Vitals /71 Pulse (!) 113 Temp 97.8 °F (36.6 °C) Resp 29 Ht 5' 9\" (1.753 m) Wt 65.3 kg (144 lb) SpO2 97% BMI 21.27 kg/m² Gen: Appears comfortable, no distress. Pulm: comfortable respirations with no abnormal audible breath sounds HEART: well perfused, no abnormal audible heart sounds GI: abdomen flat. PLAN:  Informed consent discussion held, patient afforded an opportunity to ask questions and all questions answered. After being advised of the risks, benefits, and alternatives, the patient requested that we proceed and indicated so on a written consent form. Will proceed with procedure as planned.  
Ian Rivera MD

## 2019-04-12 NOTE — CONSULTS
Name: [address] [place] [address] Dr: [facility]  
: 1970 Admit Date: 2019 Phone:   Room: ENDO/PL  
PCP: Perry Trujillo MD  MRN: [id number] Date: 2019  Code: Full Code Chart and notes reviewed. Data reviewed. I review the patient's current medications in the medical record at each encounter. I have evaluated and examined the patient. HPI: 
 
11:24 AM    
 
History was obtained from patient and spouse/SO. I was asked by Gloria Mehta MD to see Boris Pillai in consultation for a chief complaint of pleural effusion. History of Present Illness: 
Mr. Gab Byrd is a 43 yo gentleman with a history esophageal cancer, lung cancer, LIZETH (does not wear CPAP), COPD, and new suspected anal/rectal cancer. Found to have a large right sided pleural effusion. Underwent colonoscopy and EGD today. Had his PEG replaced and found to have significant anal/rectal stenosis, suspicious for malignancy. He had a chest x-ray for shortness of breath and found to have a right sided effusion. He tells me has been short of breath for quite some time and attributed it to his COPD. No cough, chest pain, f/c. 
 
Labs reviewed: no new labs today other than a glucose of 130 Images: CXR with large right sided effusion, nearly completely collapsing the right lung Past Medical History:  
Diagnosis Date  Cancer [facility])   
 esophagus cancer, right lung cancer  Chronic obstructive pulmonary disease ([facility]  Diabetes ([facility]  Hypertension  Sleep apnea   
 no cpap Past Surgical History:  
Procedure Laterality Date  HX ENDOSCOPY    
 w/ PEG tube  HX HEENT    
 14years old had eye surgery Family History Problem Relation Age of Onset  Heart Disease Mother  Hypertension Mother  Heart Disease Brother  Heart Disease Maternal Grandmother  Heart Disease Paternal Grandmother Social History Tobacco Use  
  Smoking status: Former Smoker Last attempt to quit: 2018 Years since quittin.1  Smokeless tobacco: Never Used Substance Use Topics  Alcohol use: No  
 
 
Allergies Allergen Reactions  Codeine Other (comments)  
  hallucinations  Pcn [Penicillins] Hives Current Facility-Administered Medications Medication Dose Route Frequency  midazolam (VERSED) injection 0.25-5 mg  0.25-5 mg IntraVENous PRN  
 fentaNYL citrate (PF) injection 25 mcg  25 mcg IntraVENous PRN  
 simethicone (MYLICON) 19XX/4.0QC oral drops 80 mg  1.2 mL Oral Multiple  sodium chloride (NS) flush 5-40 mL  5-40 mL IntraVENous Q8H  
 sodium chloride (NS) flush 5-40 mL  5-40 mL IntraVENous PRN  
 acetaminophen (TYLENOL) tablet 650 mg  650 mg Oral Q4H PRN  
 morphine injection 2 mg  2 mg IntraVENous Q4H PRN  
 ondansetron (ZOFRAN) injection 4 mg  4 mg IntraVENous Q4H PRN  
 enoxaparin (LOVENOX) injection 40 mg  40 mg SubCUTAneous Q24H REVIEW OF SYSTEMS  
12 point ROS negative except as stated in the HPI. Physical Exam:  
Visit Vitals /78 Pulse (!) 107 Temp 97.6 °F (36.4 °C) Resp 26 Ht 5' 9\" (1.753 m) Wt 65.3 kg (144 lb) SpO2 97% BMI 21.27 kg/m² General:  Alert, cooperative, no distress, appears stated age. Head:  Normocephalic, without obvious abnormality, atraumatic. Eyes:  Conjunctivae/corneas clear. Nose: Nares normal. Septum midline. Mucosa normal.   
Throat: Lips, mucosa, and tongue normal.   
Neck: Supple, symmetrical, trachea midline, no adenopathy. Lungs:   Absent on the right and diminished the left, but clear, respirations non-labored at rest.  
Chest wall:  No tenderness or deformity. Heart:  Regular rate and rhythm, S1, S2 normal, no murmur, click, rub or gallop. Abdomen:   Soft, non-tender. PEG in place Extremities: Extremities normal, atraumatic, no cyanosis or edema. Pulses: 2+ and symmetric all extremities. Skin: Skin color, texture, turgor normal. No rashes or lesions Lymph nodes: Cervical, supraclavicular nodes normal.  
Neurologic: Grossly nonfocal  
 
 
Lab Results Component Value Date/Time Sodium 137 01/30/2014 04:50 PM  
 Potassium 4.1 01/30/2014 04:50 PM  
 Chloride 99 01/30/2014 04:50 PM  
 CO2 20 01/30/2014 04:50 PM  
 BUN 29 (H) 01/30/2014 04:50 PM  
 Creatinine 1.63 (H) 01/30/2014 04:50 PM  
 Glucose 159 (H) 01/30/2014 04:50 PM  
 Calcium 9.9 01/30/2014 04:50 PM  
 
 
No results found for: WBC, HGB, PLT, MCV, HGBEXT, PLTEXT Lab Results Component Value Date/Time AST (SGOT) 33 01/30/2014 04:50 PM  
 Alk. phosphatase 80 01/30/2014 04:50 PM  
 Protein, total 7.5 01/30/2014 04:50 PM  
 Albumin 4.5 01/30/2014 04:50 PM  
 
 
No results found for: IRON, FE, TIBC, IBCT, PSAT, FERR No results found for: SR, CRP, JOS, ANAIGG, RA, RPR, RPRT, VDRLT, VDRLS, TSH, TSHEXT No results found for: PH, PHI, PCO2, PCO2I, PO2, PO2I, HCO3, HCO3I, FIO2, FIO2I No results found for: CPK, RCK1, RCK2, RCK3, RCK4, CKNDX, CKND1, TROPT, TROIQ, BNPP, BNP No results found for: CULT No results found for: TOXA1, RPR, HBCM, HBSAG, HAAB, HCAB1, HAAT, G6PD, CRYAC, HIVGT, HIVR, HIV1, HIV12, HIVPC, HIVRPI No results found for: VANCT, CPK No results found for: COLOR, APPRN, SPGRU, HILDA, PROTU, GLUCU, KETU, BILU, BLDU, UROU, TRISH, LEUKU, WBCU, RBCU, UEPI, BACTU, CASTS, UCRY IMPRESSION 
· Pleural effusion · Esophageal cancer · Lung cancer · Suspected jos/rectal cancer · COPD · Sleep apnea (does not wear CPAP) PLAN 
· Goal sats >90% · Will do a diagnostic and therapeutic thoracentesis today · Will start duonebs, had been given a sample of anoro at last clinic visit but that was in May and does not appear to have continued on any inhalers according to his med list 
· Oncology consulted, follows with Dr. Angela Lyon as an outpatient · Colorectal surgery consulted · Palliative consulted Thank you for allowing us to participate in the care of this patient. We will be happy to follow along in his/her progress with you. Richard Delgado MD  
 
 
Addendum 4:28pm: CXR with residual pleural effusion despite draining off approximately 2L of fluid. CT chest from 1/2019 reviewed and had small to moderate effusion on the R and trace on the left. Suspect a component of trapped lung given fluid stopped easily flowing and it appears the fluid has been accumulating at least since January.

## 2019-04-12 NOTE — ROUTINE PROCESS
Chris Nestor 1970 
[id number] Situation: 
Verbal report received from: Shawna Olmos RN Procedure: Procedure(s): 
COLONOSCOPY 
ESOPHAGOGASTRODUODENOSCOPY (EGD) COLON BIOPSY PERCUTANEOUS ENDOSCOPIC GASTROSTOMY TUBE CHANGE Background: 
 
Preoperative diagnosis: ADENOCARCINOMA ESOPHAGEAL 
ABNORMAL WEIGHT LOSS 
ATTENTION TO GASTROSTOMY MAILIGNANT NEOPLASM OF LOWER THIRD OF ESOPHAGUS Postoperative diagnosis: * No post-op diagnosis entered * :  Dr. Rose Hanna Assistant(s): Endoscopy Technician-1: Javi Bain 
Endoscopy RN-1: Charlotte Sapp RN Specimens:  
ID Type Source Tests Collected by Time Destination 1 : anal stenosis biopsy Preservative   Lissett Grace MD 4/12/2019 0524 Pathology H. Pylori  no Assessment: 
Intra-procedure medications Anesthesia gave intra-procedure sedation and medications, see anesthesia flow sheet yes Intravenous fluids: NS@ Debbie Cheese Vital signs stable Abdominal assessment: round and soft Recommendation: 
Discharge patient per MD order. To be admitted Permission to share finding with family or friend yes

## 2019-04-12 NOTE — ROUTINE PROCESS
Ghazala Breauxkin 1970 
[id number] Situation: 
Verbal report received from: Kobi Parikh RN Procedure: Procedure(s): 
THORACENTESIS Background: 
 
Preoperative diagnosis: Pleural Effusion Postoperative diagnosis: Thoracentesis performed :  Dr. Nadya Aparicio Assistant(s): Endoscopy Technician-1: Martine Whitman Endoscopy RN-1: Joseph Bauer RN Specimens: * No specimens in log * H. Pylori  no Assessment: 
Intra-procedure medications Intravenous fluids: NS@ [facility] Vital signs stable Bandaid on back clean and dry Recommendation: 
Discharge patient per MD order. Return to floor Family or Friend Permission to share finding with family or friend yes

## 2019-04-12 NOTE — PROGRESS NOTES
GI note Discussed results with family/patient He reports significant anal pain and is dyspneic and tachypneic. Will give IV fentanyl and request admission, consultation with palliative care, consultation with oncology (he sees Dr. Amber Miles), and consultation with colorectal surgery. Clearly, final plans can't be made until biopsies are back but he is not eating, has uncontrolled pain, and is having signs of anal stenosis with poor elimination and I fear if he is sent back home he will not do well.  
Dorene Browning MD

## 2019-04-12 NOTE — PERIOP NOTES
Patient resting comfortably on stretcher, HOB elevated, VS stable, call bell within reach,familyat bedside, waiting for procedure start, will continue to monitor pt.

## 2019-04-12 NOTE — PROCEDURES
[address] [facility] [address] [place] [address], [address] [place] [address] 
[phone] Amber Iniguez 1970 
[id number] Date of Procedure: 4/12/2019 Preoperative diagnosis: Pleural Effusion Procedure: Procedure(s): 
THORACENTESIS Indication: pleural effusion :  Daly Nina MD 
 
Assistant(s): Endoscopy Technician-1: Claudene Fee Endoscopy RN-1: Iliana Alan RN Anesthesia/Sedation:  None Procedure Details: After infomed consent was obtained for the procedure, with all risks and benefits of procedure explained the patient was placed in the left lateral position as he could not tolerate sitting up due to rectal pain. US used to localize and li a pocket of fluid amenable to thoracentesis. Cleaned, prepped, and numbed in the usual fashion. Thora catheter advanced over the needle into pleural space under continuous aspiration until serous fluid was aspirated. Catheter was fully advanced as the needle was retracted. 2100cc removed before fluid stopped flowing. No complications. Fluid sent for routine studies Complications:   None noted; patient tolerated the procedure well. EBL:  Minimal 
        
Impression:    Thoracentesis performed Recommendations:  
Await CXR and fluid results Pavel Rios MD 
4/12/2019  12:31 PM

## 2019-04-12 NOTE — H&P
[address] [facility] 
[phone] Admission History and Physical 
 
 
NAME:  Julienne Daugherty :   1970 MRN:  [id number] PCP:  Angelo Mandel MD  
 
Date/Time:  2019 Subjective: CHIEF COMPLAINT: pain HISTORY OF PRESENT ILLNESS:    
Mr. Beronica Moon is a 44 y.o. with h/o esophageal cancer, dm, htn who presents with pain. Pt arrived for colonoscopy and EGD. After the procedures he developed dyspnea and severe rectal pain. He did not have severe pain previously. His shortness of breath has now resolved. Past Medical History:  
Diagnosis Date  Cancer [facility])   
 esophagus cancer, right lung cancer  Chronic obstructive pulmonary disease ([facility]  Diabetes ([facility]  Hypertension  Sleep apnea   
 no cpap Past Surgical History:  
Procedure Laterality Date  COLONOSCOPY N/A 2019 COLONOSCOPY performed by Jeremi Peters MD at [address] [facility] HX ENDOSCOPY    
 w/ PEG tube  HX HEENT    
 14years old had eye surgery Social History Tobacco Use  Smoking status: Former Smoker Last attempt to quit: 2018 Years since quittin.1  Smokeless tobacco: Never Used Substance Use Topics  Alcohol use: No  
  
 
Family History Problem Relation Age of Onset  Heart Disease Mother  Hypertension Mother  Heart Disease Brother  Heart Disease Maternal Grandmother  Heart Disease Paternal Grandmother Allergies Allergen Reactions  Codeine Other (comments)  
  hallucinations  Pcn [Penicillins] Hives Prior to Admission medications Medication Sig Start Date End Date Taking? Authorizing Provider  
sertraline (ZOLOFT) 50 mg tablet Take 50 mg by mouth daily. Yes Provider, Historical  
 
 
 
Review of Systems: 
  
Gen:  Eyes:  ENT:  CVS:  Pulm:  dyspneaGI:   
:   
MS:  Skin:  Psych:  Endo:   
Hem:  Renal:   
Neuro: Objective: VITALS:   
Vital signs reviewed; most recent are: 
 
Visit Vitals /64 (BP 1 Location: Left arm) Pulse (!) 102 Temp 97.9 °F (36.6 °C) Resp 16 Ht 5' 9\" (1.753 m) Wt 65.3 kg (144 lb) SpO2 100% BMI 21.27 kg/m² SpO2 Readings from Last 6 Encounters:  
04/12/19 100% 06/09/14 99% 04/09/14 97% 01/30/14 97% 01/18/14 99% O2 Flow Rate (L/min): 4 l/min Intake/Output Summary (Last 24 hours) at 4/12/2019 1551 Last data filed at 4/12/2019 1756 Gross per 24 hour Intake 500 ml Output  Net 500 ml Exam:  
 
Physical Exam: 
 
Gen:  Well-developed, well-nourished, in no acute distress HEENT:  Pink conjunctivae, PERRL, hearing intact to voice, moist mucous membranes Neck:  Supple, without masses, thyroid non-tender Resp:  No accessory muscle use, decreased breath sounds Card:  No murmurs, normal S1, S2 without thrills, bruits or peripheral edema Abd:  Soft, non-tender, non-distended, normoactive bowel sounds are present, no palpable organomegaly Lymph:  No cervical adenopathy Musc:  No cyanosis or clubbing Skin:  No rashes or ulcers, skin turgor is good Neuro:  Cranial nerves 3-12 are grossly intact,  strength is 5/5 bilaterally, dorsi / plantarflexion strength is 5/5 bilaterally, follows commands appropriately Psych:  Alert with good insight. Oriented to person, place, and time Labs: 
 
Recent Labs 04/12/19 
1236 WBC 10.8 HGB 12.6 HCT 37.6  Recent Labs 04/12/19 
1236   
K 3.5  CO2 22 * BUN 48* CREA 2.44* CA 9.5 ALB 2.8* SGOT 15 ALT 12 No components found for: [place] No results for input(s): PH, PCO2, PO2, HCO3, FIO2 in the last 72 hours. No results for input(s): INR in the last 72 hours. No lab exists for component: INREXT Chest Xray: right pleural effusion Assessment/Plan:   
  
Rectal pain / ?anorectal carcinoma: biopsies taken awaiting results. Suspect increased pain is related to colonoscopy. Order IV morphine. Colorectal surgery consulted Dyspnea: resolving after colonoscopy. STAT CXR ordered. Images personally visualized pt noted to have large pleural effusion. ?pna but he has no symptoms consistent with this. Will hold off on abx for now. Consult pulm for thoracentesis. HERMINIA: suspect IVVD due to poor po intake. Start IVF. Order renal ultrasound to r/o obstruction given h/o cancer. Esophageal cancer: stricture noted on EGD 
 
PEG tube: not currently in use. If pt unable to sustain adequate oral intake could consider resuming tube feeds Total time spent with patient: 70 Minutes Care Plan discussed with: Patient and Family Discussed:  Care Plan Prophylaxis:  Lovenox Probable Disposition:  Home w/Family 
        
___________________________________________________ Attending Physician: Kandi Almanza MD

## 2019-04-12 NOTE — PROGRESS NOTES
[facility]: MED RECONCILIATION Comments/Recommendations:  
Patient is awake, alert, and knowledgeable about home medications Verifies allergies and preferred pharmacy listed. Pharmacist reviewed prescription refill history with Rx Query. The patient has a PEG but reports he takes all of his medications by mouth. The patient has not had his sertraline filled recently. It is reported he was taking the medication, stopped it, and then recently resumed the medication. Medications added:  
 
None Medications removed: 
 
Omeprazole Medications adjusted: 
 
None Allergies: Codeine and Pcn [penicillins] Prior to Admission Medications:  
Prior to Admission Medications Prescriptions Last Dose Informant Patient Reported? Taking?  
sertraline (ZOLOFT) 50 mg tablet 4/10/2019  Yes Yes Sig: Take 50 mg by mouth daily. Facility-Administered Medications: None Thank you, Hallie Carmona, PharmD, BCPS

## 2019-04-12 NOTE — CONSULTS
Heme/Onc Consult noted for discovery of anorectal carcinoma by Dr Wesley Guzmán. Chart reviewed. Mr Oseas Delgado is an already established pt of Dr Coombs [address] [place]. Caimital Bajo at [facility] and would like to continue care with Dr Coombs [address] [place]. Caim[place] Bajo. Discussed with Dr Wesley Guzmán; will await final path and re-consult if needed. Spoke with Dr Rolando Tyler office nurse, Mario Ram to advise of admission and new finding. Dr Coombs [address] [place]. Caimital Bajo is out of the office today but she will notify him when he returns on Monday. Will hold on formal consult for now but are happy to assist in the future for any questions or concerns. Plan reviewed with Dr Ju Ritchie.   
 
Janina Castillo, LILIANA

## 2019-04-12 NOTE — PROCEDURES
[address] [facility]. Vargas Gottlieb, [address] [facility] / [facility] 
[phone] 2019 Esophagogastroduodenoscopy & Colonoscopy Procedure Note Maren Garcia : 1970 [address] [place] [address] Record Number: [id number] Indications:    Esophageal carcinoma, under treatment, with PEG in place and needs replacement Screening colonoscopy and anal pain Referring Physician:  Isai Seals MD 
Anesthesia/Sedation: Conscious Sedation/Moderate Sedation/MAC Endoscopist:  Dr. Scott Baca Complications:  None Estimated Blood Loss:  None Permit: The indications, risks, benefits and alternatives were reviewed with the patient or their decision maker who was provided an opportunity to ask questions and all questions were answered. The specific risks of esophagogastroduodenoscopy with conscious sedation were reviewed, including but not limited to anesthetic complication, bleeding, adverse drug reaction, missed lesion, infection, IV site reactions, and intestinal perforation which would lead to the need for surgical repair. Alternatives to EGD and colonoscopy including radiographic imaging, observation without testing, or laboratory testing were reviewed as well as the limitations of those alternatives discussed. After considering the options and having all their questions answered, the patient or their decision maker provided both verbal and written consent to proceed. -----------EGD------------ Procedure in Detail: After obtaining informed consent, positioning of the patient in the left lateral decubitus position, and conduction of a pre-procedure pause or \"time out\" the endoscope was introduced into the mouth and advanced to the duodenum. A careful inspection was made, and findings or interventions are described below.  
 
Findings:  
Esophagus:Granular mucosa and malignant appearing stricture 37-40 cm in esophagus. Stomach: PEG in place; this is removed and replaced with a new 20Fr device. Duodenum/jejunum: normal 
 
 
 
----------Colonoscopy----------- Procedure in Detail: After obtaining informed consent, positioning of the patient in the left lateral decubitus position, and conduction of a pre-procedure pause or \"time out\" the endoscope was introduced into the anus and advanced to the sigmoid colon. The quality of the colonic preparation was inadequate. A careful inspection was made as the colonoscope was withdrawn, findings and interventions are described below. Findings: The anal opening is firm, sclerotic, stricutred and narrowed to less than 11mm in size. The pediatric colonoscope would not pass into the anal stenosis. We had to use an upper GI scope and use significant pressure to get into the anus. Once we got into the rectum- profuse stool is seen throughout. The anal canal is friable, villiform, firm, fixed, and the endoscopic impression is that of anal carcinoma. I took numerous biopsies for histology. ------------------------------ Specimens:  
 See above Complications:  
None; patient tolerated the procedure well. Impressions: 
EGD:  PEG replaced Colonoscopy: Anal stenosis and probable anal or rectal carcinoma. Recommendations: - Await pathology. 
-Will discuss with patient his desires in regards continued outpatient care versus admission for consultation with oncology and colorectal surgery Thank you for entrusting me with this patient's care. Please do not hesitate to contact me with any questions or if I can be of assistance with any of your other patients' GI needs. Signed By: Natanael Gaitan MD 
                      April 12, 2019 Surgical assistant none. Implants none unless specified.

## 2019-04-13 NOTE — PROGRESS NOTES
Bedside and Verbal shift change report given to Nicolas Hong (oncoming nurse) by Horace Leyden (offgoing nurse). Report included the following information SBAR, Kardex, Intake/Output, MAR, Accordion and Recent Results.

## 2019-04-13 NOTE — PROGRESS NOTES
Bedside and Verbal shift change report given to Radha Wilson RN (oncoming nurse) by Silva Ferrara RN (offgoing nurse). Report included the following information SBAR, Kardex, Intake/Output, MAR and Recent Results. Left a message for Dr. John Ramsey concerning morning labs.

## 2019-04-13 NOTE — CONSULTS
Assessment:  
 
45 yo man with hx of esophageal ca and now with rectal pain and stricture concerning for cancer Plan:  
Awaiting biopsy results May need EUS for further eval 
Pt reports that he had a CT at [facility] on Friday - will review Currently not fully obstructed and abd soft may need diverting ostomy if he becomes obstructed Linda Hirsch for discharge home with office follow up if medically cleared He has been given our contact information for follow up Signed By: Abdulaziz Mtz MD  
 April 13, 2019 General Surgery Consult Subjective:  
  
Ilan Pruett is a 44 y.o.  male with esophageal cancer and new concern for rectal cancer. He reports a 20 pound weight loos in the past 2 months. He reports that he has had episodes of pain and \"swelling\" of his rectum for the last several months that would seem to resolve after several days. He reports that this current episode has lasted a month and he has had worsening pain and pressure. He reports that he has stool incontinence recently. He has had a good appetite and denies any nausea or vomiting. He had an attempted colonoscopy yesterday but due to pain and stricturing they were unable to complete the study. Biopsies are pending. He reports that he was diagnosed with esophageal cancer in Feb 2018. He has had chemo for this and reports a recent lung mass that was noted. He is not currently on chem due to intolerance of the last regimen. He is scheduled to see his oncologist in 3 weeks. Past Medical History:  
Diagnosis Date  Cancer [facility])   
 esophagus cancer, right lung cancer  Chronic obstructive pulmonary disease ([facility] [address]  Diabetes ([facility] [address]  Hypertension  Sleep apnea   
 no cpap Past Surgical History:  
Procedure Laterality Date  COLONOSCOPY N/A 4/12/2019 COLONOSCOPY performed by Christian Paulino MD at [facility] HX ENDOSCOPY    
 w/ PEG tube  HX HEENT    
 14years old had eye surgery Family History Problem Relation Age of Onset  Heart Disease Mother  Hypertension Mother  Heart Disease Brother  Heart Disease Maternal Grandmother  Heart Disease Paternal Grandmother Social History Socioeconomic History  Marital status: SINGLE Spouse name: Not on file  Number of children: Not on file  Years of education: Not on file  Highest education level: Not on file Tobacco Use  Smoking status: Former Smoker Last attempt to quit: 2018 Years since quittin.1  Smokeless tobacco: Never Used Substance and Sexual Activity  Alcohol use: No  
 Sexual activity: Yes  
  Partners: Female Current Facility-Administered Medications Medication Dose Route Frequency  dextrose 5% - 0.45% NaCl with KCl 20 mEq/L infusion  75 mL/hr IntraVENous CONTINUOUS  
 potassium chloride 10 mEq in 100 ml IVPB  10 mEq IntraVENous Q1H  
 sodium chloride (NS) flush 5-40 mL  5-40 mL IntraVENous Q8H  
 sodium chloride (NS) flush 5-40 mL  5-40 mL IntraVENous PRN  
 acetaminophen (TYLENOL) tablet 650 mg  650 mg Oral Q4H PRN  
 morphine injection 2 mg  2 mg IntraVENous Q4H PRN  
 ondansetron (ZOFRAN) injection 4 mg  4 mg IntraVENous Q4H PRN  
 enoxaparin (LOVENOX) injection 40 mg  40 mg SubCUTAneous Q24H  
 albuterol-ipratropium (DUO-NEB) 2.5 MG-0.5 MG/3 ML  3 mL Nebulization Q6H RT  
 oxyCODONE (ROXICODONE INTENSOL) 20 mg/mL concentrated solution 5 mg  5 mg Oral Q4H PRN  polyethylene glycol (MIRALAX) packet 17 g  17 g Oral DAILY  senna (SENOKOT) tablet 8.6 mg  1 Tab Oral DAILY  sertraline (ZOLOFT) tablet 50 mg  50 mg Oral DAILY Allergies Allergen Reactions  Codeine Other (comments)  
  hallucinations  Pcn [Penicillins] Hives Review of Systems:   
 []     Unable to obtain  ROS due to  []    mental status change  []    sedated   []    intubated [x]    Total of 12 system negative, unless specified below or in HPI: 
Constitutional: negative fever, negative chills, negative weight loss Eyes:   negative visual changes ENT:   negative sore throat, tongue or lip swelling Respiratory:  negative cough, negative dyspnea Cards:  negative for chest pain, palpitations, lower extremity edema GI:   negative for nausea, vomiting, diarrhea, and abdominal pain :  negative for frequency, dysuria Integument:  negative for rash and pruritus Heme:  negative for easy bruising and gum/nose bleeding Musculoskel: negative for myalgias,  back pain and muscle weakness Neuro:  negative for headaches, dizziness, vertigo Psych:  negative for feelings of anxiety, depression Objective:  
 
  
Patient Vitals for the past 8 hrs: 
 BP Temp Pulse Resp SpO2  
19 0754 105/70 97.9 °F (36.6 °C) 95 18 94 % 19 0420 92/60 98.8 °F (37.1 °C) (!) 102 18 93 % Temp (24hrs), Av.1 °F (36.7 °C), Min:97.7 °F (36.5 °C), Max:98.8 °F (37.1 °C) Physical Exam: 
General:  Alert, cooperative, no distress, appears stated age. Eyes:  Conjunctivae clear. PERRL, EOMs intact. Nose: Nares normal. Septum midline. Mucosa normal. No drainage or sinus tenderness. Mouth/Throat: Lips, mucosa, and tongue normal. Teeth and gums normal.  
Neck: Supple, symmetrical, trachea midline Back:   Symmetric, no curvature. ROM normal. No CVA tenderness. Lungs:   Clear to auscultation bilaterally. Heart:  Regular rate and rhythm Abdomen:   Soft, non-tender. Bowel sounds normal. No masses,  No organomegaly. PEG in place Rectal: thickened tissue, extremely tender no evidence of bleeding Extremities: Extremities normal, atraumatic, no cyanosis or edema. Pulses: 2+ and symmetric all extremities. Skin: Skin color, texture, turgor normal. No rashes or lesions Labs:  
Recent Labs 19 
5428 WBC 8.8 HGB 11.3* HCT 33.9*  
 Recent Labs 04/13/19 
0433 04/12/19 
1236  141  
K 2.8* 3.5 * 106 CO2 19* 22 * 151* BUN 43* 48* CREA 2.38* 2.44* CA 8.5 9.5 MG 1.8  --   
PHOS 3.2  --   
ALB 2.5* 2.8* TBILI  --  0.4 SGOT  --  15 ALT  --  12 No results for input(s): INR in the last 72 hours.  
 
No lab exists for component: INREXT

## 2019-04-13 NOTE — PROGRESS NOTES
[facility] 
[address] [facility], [address] [place] [address] 
[phone] Medical Progress Note NAME: Julienne Daugherty :  1970 MRM:  [id number] Date/Time: 2019 Assessment / Plan:  
 
Rectal pain / ?anorectal carcinoma: biopsies taken colonoscopy. Pathology pending. Will advance diet and DC home when able to eat. Appreciate CRS.   
Dyspnea: improved, due to large pleural effusion, s/p 2.1L thoracentesis. Seen by pulmonology Hypokalemia: moderately severe. Replace IV. Monitor HERMINIA: presumed. However last Cr in system from  (1.6). Continue IVF, may be 2/2 IVVD due to poor po intake. Renal US showed no evidence of obstruction Hypotension: borderline. No fever or leukocytosis to suggest infection. Monitor off abx, and on IVF 
  
Esophageal cancer: stricture noted on EGD. Outpatient follow up, s/p XRT. ? Lung CA: s/p chemo. Outpatient follow up 
  
PEG tube: not currently in use. If pt unable to maintain adequate PO intake could consider resuming tube feeds 
  
 
Total time spent:  35 minutes Time spent in the care of this patient including reviewing records, discussing with nursing and/or other providers on the treatment team, obtaining history and examining the patient, and discussing treatment plans. Care Plan discussed with: Patient, Nursing Staff and >50% of time spent in counseling and coordination of care Discussed:  Care Plan and D/C Planning Prophylaxis:  Change Lovenox to heparin SQ due to renal dysfxn Disposition:  Home w/Family Subjective: Chief Complaint:  Follow up weakness Chart/notes/labs/studies reviewed, patient examined at bedside. Feels okay. Less rectal pain. Wants to advance diet. No fevers Objective:  
 
 
Vitals:  
 
  
Last 24hrs VS reviewed since prior progress note. Most recent are: 
 
Visit Vitals BP 99/69 Pulse 95 Temp 98.4 °F (36.9 °C) Resp 18  5' 9\" (1.753 m) Wt 65.3 kg (144 lb) SpO2 95% BMI 21.27 kg/m² SpO2 Readings from Last 6 Encounters:  
04/13/19 95% 06/09/14 99% 04/09/14 97% 01/30/14 97% 01/18/14 99% O2 Flow Rate (L/min): 4 l/min No intake or output data in the 24 hours ending 04/13/19 1721 Exam:  
 
Physical Exam: 
 
Gen:  Chronically ill-appearing. NAD. Pleasant HEENT:  Sclerae nonicteric, hearing intact to voice, mucous membranes moist 
Neck:  Supple, without masses. Resp:  No accessory muscle use, diminished in bases, R>L. No wheezing or rhonchi 
Card: distant, RRR, without m/r/g. No LE edema. Abd:  +bowel sounds, soft, NTTP, nondistended. No HSM. PEG in place. Neuro: Face symmetric, tongue midline, speech fluent, follows commands appropriately Psych:  Alert, oriented x 3. Fair insight Medications Reviewed: (see below) Lab Data Reviewed: (see below) 
 
______________________________________________________________________ Medications:  
 
Current Facility-Administered Medications Medication Dose Route Frequency  dextrose 5% - 0.45% NaCl with KCl 20 mEq/L infusion  75 mL/hr IntraVENous CONTINUOUS  
 potassium chloride 10 mEq in 100 ml IVPB  10 mEq IntraVENous Q1H  
 potassium chloride SR (KLOR-CON 10) tablet 10 mEq  10 mEq Oral NOW  sodium chloride (NS) flush 5-40 mL  5-40 mL IntraVENous Q8H  
 sodium chloride (NS) flush 5-40 mL  5-40 mL IntraVENous PRN  
 acetaminophen (TYLENOL) tablet 650 mg  650 mg Oral Q4H PRN  
 morphine injection 2 mg  2 mg IntraVENous Q4H PRN  
 ondansetron (ZOFRAN) injection 4 mg  4 mg IntraVENous Q4H PRN  
 enoxaparin (LOVENOX) injection 40 mg  40 mg SubCUTAneous Q24H  
 albuterol-ipratropium (DUO-NEB) 2.5 MG-0.5 MG/3 ML  3 mL Nebulization Q6H RT  
 oxyCODONE (ROXICODONE INTENSOL) 20 mg/mL concentrated solution 5 mg  5 mg Oral Q4H PRN  polyethylene glycol (MIRALAX) packet 17 g  17 g Oral DAILY  senna (SENOKOT) tablet 8.6 mg  1 Tab Oral DAILY  sertraline (ZOLOFT) tablet 50 mg  50 mg Oral DAILY Lab Review:  
 
Recent Labs 04/13/19 
0433 04/12/19 
1236 WBC 8.8 10.8 HGB 11.3* 12.6 HCT 33.9* 37.6  231 Recent Labs 04/13/19 
0433 04/12/19 
1236  141  
K 2.8* 3.5 * 106 CO2 19* 22 * 151* BUN 43* 48* CREA 2.38* 2.44* CA 8.5 9.5 MG 1.8  --   
PHOS 3.2  --   
ALB 2.5* 2.8* SGOT  --  15 ALT  --  12 No components found for: [place] No results for input(s): PH, PCO2, PO2, HCO3, FIO2 in the last 72 hours. No results for input(s): INR in the last 72 hours. No lab exists for component: INREXT No results found for: SDES Lab Results Component Value Date/Time Culture result: NO GROWTH AFTER 16 HOURS 04/12/2019 12:27 PM  
 
        
___________________________________________________ Attending Physician: Maria Elena Cano MD

## 2019-04-13 NOTE — PROGRESS NOTES
Spiritual Care Assessment/Progress Note [address] 
 
 
NAME: Pito Finn      MRN: [id number] AGE: 44 y.o. SEX: male Anabaptist Affiliation: No Baptism Language: [place] 4/13/2019     Total Time (in minutes): 5 Spiritual Assessment begun in [facility]  MED SURG 2 through conversation with: 
  
    [x]Patient        [] Family    [] Friend(s) Reason for Consult: Palliative Care, Initial/Spiritual Assessment Spiritual beliefs: (Please include comment if needed) [x] Identifies with a daam tradition:     
   [] Supported by a adam community:        
   [] Claims no spiritual orientation:       
   [] Seeking spiritual identity:            
   [] Adheres to an individual form of spirituality:       
   [] Not able to assess:                   
 
    
Identified resources for coping:  
   [] Prayer                           
   [] Music                  [] Guided Imagery [x] Family/friends                 [] Pet visits [] Devotional reading                         [] Unknown 
   [] Other:                                          
 
 
Interventions offered during this visit: (See comments for more details) Patient Interventions: Affirmation of emotions/emotional suffering, Affirmation of adam, Catharsis/review of pertinent events in supportive environment, Coping skills reviewed/reinforced, Iconic (affirming the presence of God/Higher Power) Plan of Care: 
 
 [] Support spiritual and/or cultural needs  
 [] Support AMD and/or advance care planning process    
 [] Support grieving process 
 [] Coordinate Rites and/or Rituals  
 [] Coordination with community clergy [] No spiritual needs identified at this time 
 [] Detailed Plan of Care below (See Comments)  [] Make referral to Music Therapy 
[] Make referral to Pet Therapy    
[] Make referral to Addiction services 
[] Make referral to [facility] 
[] Make referral to Spiritual Care Partner [] No future visits requested       
[] Follow up visits as needed Comments:  visit for initial spiritual assessment, palliative care consult. Patient lying in bed watching television. Good eye contact, personable, friendly. Says he is feeling pretty good considering. Provided spiritual presence and listening as he spoke of his present thoughts, feelings, and concerns. Spoke about having been diagnosed with throat cancer in February 2018 and, then, later, lung cancer. He says he is awaiting biopsy results on a mass in his colon. He spoke of how all of this has effected his life, his emotions, and his health, but he remains positive and says he tries to make the best of it. 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 He says he is not in need of anything in particular at this time. He appeared encouraged as a result of this visit and expressed gratitude for this visit. Visited by Rev. Andriy Lisa, [facility]  paging service: [phone])

## 2019-04-14 NOTE — PROGRESS NOTES
[address] 
[phone] Medical Progress Note NAME: Sanchez Troy :  1970 MRM:  [id number] Date/Time: 2019 Assessment / Plan:  
 
Rectal pain / ?anorectal carcinoma: biopsies taken colonoscopy. Pathology pending. Not passing stool, just mucus. May need diverting ostomy due to the significant obstruction. CRS following 
  Dyspnea: improved, due to large pleural effusion, s/p 2.1L thoracentesis. Seen by pulmonology Hypokalemia: moderately severe POA, corrected with IV repletion. Follow BMP, Mg, phos HERMINIA: presumed. However last Cr in system from  (1.6). Continue IVF, may be 2/2 IVVD due to poor po intake. Renal US showed no evidence of obstruction. Cr improving, follow BMP Hypotension: borderline. No fever or leukocytosis to suggest infection. Monitor off abx, and cont IVF 
  
Esophageal cancer: stricture noted on EGD. Outpatient follow up, s/p XRT. ? Lung CA: s/p chemo. Outpatient follow up 
  
PEG tube: not currently in use. If pt unable to maintain adequate PO intake could consider resuming tube feeds 
  
 
Total time spent:  25 minutes Time spent in the care of this patient including reviewing records, discussing with nursing and/or other providers on the treatment team, obtaining history and examining the patient, and discussing treatment plans. Care Plan discussed with: Patient, Nursing Staff and >50% of time spent in counseling and coordination of care Discussed:  Care Plan and D/C Planning Prophylaxis:  Change Lovenox to heparin SQ due to renal dysfxn Disposition:  Home w/Family Subjective: Chief Complaint:  Follow up weakness Chart/notes/labs/studies reviewed, patient examined at bedside. Eating but not passing stool, just mucus. No significant abd pain or nausea. No F/C Objective:  
 
 
Vitals: Last 24hrs VS reviewed since prior progress note. Most recent are: 
 
Visit Vitals /68 (BP 1 Location: Left arm, BP Patient Position: At rest) Pulse 95 Temp 98 °F (36.7 °C) Resp 18 Ht 5' 9\" (1.753 m) Wt 65.3 kg (144 lb) SpO2 93% BMI 21.27 kg/m² SpO2 Readings from Last 6 Encounters:  
04/14/19 93% 06/09/14 99% 04/09/14 97% 01/30/14 97% 01/18/14 99% O2 Flow Rate (L/min): 4 l/min Intake/Output Summary (Last 24 hours) at 4/14/2019 1616 Last data filed at 4/14/2019 0700 Gross per 24 hour Intake 0 ml Output  Net 0 ml Exam:  
 
Physical Exam: 
 
Gen:  Chronically ill-appearing. NAD. Pleasant HEENT:  Sclerae nonicteric, hearing intact to voice, mucous membranes moist 
Neck:  Supple, without masses. Resp:  No accessory muscle use, diminished in bases, R>L. No wheezing or rhonchi 
Card: distant, RRR, without m/r/g. No LE edema. Abd:  +bowel sounds, soft, NTTP, nondistended. No HSM. PEG in place. Neuro: Face symmetric, tongue midline, speech fluent, follows commands appropriately Psych:  Alert, oriented x 3. Fair insight Medications Reviewed: (see below) Lab Data Reviewed: (see below) 
 
______________________________________________________________________ Medications:  
 
Current Facility-Administered Medications Medication Dose Route Frequency  levalbuterol (XOPENEX) nebulizer soln 1.25 mg/3 mL  1.25 mg Nebulization Q6H RT  
 ipratropium (ATROVENT) 0.02 % nebulizer solution 0.5 mg  0.5 mg Nebulization Q6H RT  
 dextrose 5% - 0.45% NaCl with KCl 20 mEq/L infusion  75 mL/hr IntraVENous CONTINUOUS  
 heparin (porcine) injection 5,000 Units  5,000 Units SubCUTAneous Q8H  
 sodium chloride (NS) flush 5-40 mL  5-40 mL IntraVENous Q8H  
 sodium chloride (NS) flush 5-40 mL  5-40 mL IntraVENous PRN  
 acetaminophen (TYLENOL) tablet 650 mg  650 mg Oral Q4H PRN  
 morphine injection 2 mg  2 mg IntraVENous Q4H PRN  
  ondansetron (ZOFRAN) injection 4 mg  4 mg IntraVENous Q4H PRN  
 oxyCODONE (ROXICODONE INTENSOL) 20 mg/mL concentrated solution 5 mg  5 mg Oral Q4H PRN  polyethylene glycol (MIRALAX) packet 17 g  17 g Oral DAILY  senna (SENOKOT) tablet 8.6 mg  1 Tab Oral DAILY  sertraline (ZOLOFT) tablet 50 mg  50 mg Oral DAILY Lab Review:  
 
Recent Labs 04/14/19 
0206 04/13/19 
0433 04/12/19 
1236 WBC 8.7 8.8 10.8 HGB 11.3* 11.3* 12.6 HCT 33.9* 33.9* 37.6  212 231 Recent Labs 04/14/19 
0206 04/13/19 
0433 04/12/19 
1236  140 141  
K 3.8 2.8* 3.5 * 109* 106 CO2 20* 19* 22 * 116* 151* BUN 38* 43* 48* CREA 2.09* 2.38* 2.44* CA 9.0 8.5 9.5 MG 1.7 1.8  --   
PHOS 2.2* 3.2  --   
ALB 2.5* 2.5* 2.8* SGOT 13*  --  15 ALT 8*  --  12 No components found for: [place] No results for input(s): PH, PCO2, PO2, HCO3, FIO2 in the last 72 hours. No results for input(s): INR in the last 72 hours. No lab exists for component: INREXT, INREXT No results found for: SDES Lab Results Component Value Date/Time Culture result: NO GROWTH 2 DAYS 04/12/2019 12:27 PM  
 
        
___________________________________________________ Attending Physician: Xiao Odell MD

## 2019-04-14 NOTE — PROGRESS NOTES
Bedside and Verbal shift change report given to FRANSISCA Messer (oncoming nurse) by Mackenzie Macias RN (offgoing nurse). Report included the following information SBAR, Kardex, MAR and Accordion.

## 2019-04-14 NOTE — PROGRESS NOTES
Bedside and Verbal shift change report given to Rashid Chatman (oncoming nurse) by Horace Leyden (offgoing nurse). Report included the following information SBAR, ED Summary, Intake/Output, MAR, Accordion and Recent Results.

## 2019-04-14 NOTE — PROGRESS NOTES
SURGERY PROGRESS NOTE Admit Date: 2019 POD 2 Days Post-Op Procedure: Procedure(s): 
THORACENTESIS Subjective:  
Pain improved Tolerating diet Passing mucus only no stool Objective:  
 
Visit Vitals /68 (BP 1 Location: Left arm, BP Patient Position: At rest) Pulse 95 Temp 98 °F (36.7 °C) Resp 18 Ht 5' 9\" (1.753 m) Wt 65.3 kg (144 lb) SpO2 93% BMI 21.27 kg/m² Temp (24hrs), Av.9 °F (36.6 °C), Min:97.7 °F (36.5 °C), Max:98.1 °F (36.7 °C) Physical Exam: Abdomen:  Soft. Non-tender, non-distended. Lab Results Component Value Date/Time WBC 8.7 2019 02:06 AM  
 HGB 11.3 (L) 2019 02:06 AM  
 Hemoglobin (POC) 17.0 2014 02:22 PM  
 HCT 33.9 (L) 2019 02:06 AM  
 Hematocrit (POC) 50 2014 02:22 PM  
 PLATELET 846  02:06 AM  
 MCV 88.5 2019 02:06 AM  
 
Lab Results Component Value Date/Time GFR est non-AA 34 (L) 2019 02:06 AM  
 GFRNA, POC 40 (L) 2018 04:15 PM  
 GFR est AA 41 (L) 2019 02:06 AM  
 GFRAA, POC 49 (L) 2018 04:15 PM  
 Creatinine 2.09 (H) 2019 02:06 AM  
 Creatinine (POC) 1.8 (H) 2018 04:15 PM  
 BUN 38 (H) 2019 02:06 AM  
 BUN (POC) 25 (H) 2014 02:22 PM  
 Sodium 137 2019 02:06 AM  
 Sodium (POC) 137 2014 02:22 PM  
 Potassium 3.8 2019 02:06 AM  
 Potassium (POC) 3.8 2014 02:22 PM  
 Chloride 109 (H) 2019 02:06 AM  
 Chloride (POC) 105 2014 02:22 PM  
 CO2 20 (L) 2019 02:06 AM  
 Magnesium 1.7 2019 02:06 AM  
 Phosphorus 2.2 (L) 2019 02:06 AM  
 
 
Assessment:  
 
Active Problems: Dyspnea (2019) Plan/Recommendations/Medical Decision Making: Tolerating diet but not passing stool - mucus only He will likely need a diverting ostomy due to the significant obstruction Await pathology for further planning Would keep in house until path returns

## 2019-04-15 NOTE — PROGRESS NOTES
Bedside and Verbal shift change report given to Joanne RN (oncoming nurse) by Nicole Simmons RN (offgoing nurse). Report included the following information SBAR, Kardex, MAR, Accordion and Recent Results.

## 2019-04-15 NOTE — PROGRESS NOTES
Radiologist called to RN following CT scan and suggested that pulmonary be updated on reaccumulating pleural fluid in case the patient needs repeat thoracentesis. On call physician Dr. Sharron Ko contacted and updated on patient's status and test results. She will review the chart and determine if any additional orders are required today.

## 2019-04-15 NOTE — CDMP QUERY
Good Afternoon The pt was admitted and found to have a large pleural effusion. Pulmonary has been consulted, and has noted this effusion to be \"likely malignant pleural effusion\". Can you please further specify the type of Pleural effusion and If possible, please document in progress notes and d/c summary if you are evaluating and/or treating any of the following:  
 
=>  Yes, Malignant pleural effusion (POA) 
=> Other explanation of clinical findings  
=> Clinically Undetermined (no explanation for clinical findings) The medical record reflects the following: 
   Risk Factors: Esophageal Ca, hx. of Lung cancer s/p chemo Clinical Indicators: CXR + Large Right pleural  effusion, pt sob, pt had 2.1 liters of fluid removed from the right lung. Treatment: Stat CXR, Pulmonary consulted, Thoracentesis Thank you, TONY Arriolan, RN, [address] [facility] 
[phone]

## 2019-04-15 NOTE — PROGRESS NOTES
[facility] [place] [address] 
[address] [facility], [address] [place] [address] 
[phone] Medical Progress Note NAME: Pierre Cervantes :  1970 MRM:  [id number] Date/Time: 4/15/2019 Assessment / Plan: Dyspnea: improved, due to large pleural effusion, s/p 2.1L thoracentesis on admission however fluid reaccumulating. Exudative and likely malignant per pulm. Follow up cytology. Pulmonology following, to consider repeat thoracentesis vs PleurX catheter. Noted CT chest findings. No fevers or leukocytosis or cough to suggest PNA. Defer to pulm on need for abx, but consolidation but be malignancy. Monitor closely. Rectal pain / ?anorectal carcinoma: noted gen surg's plan for diverting ostomy however biopsy taken colonoscopy showed benign colonic mucosa. Will need to discuss with general surgery and GI. May need to repeat biopsy.  
  
Esophageal cancer: stricture noted on EGD. Outpatient follow up, s/p XRT. CT showed mild ascites. There is suggestion of peritoneal carcinomatosis in the left upper abdomen. Poor prognosis ? Lung CA: s/p chemo. Outpatient follow up. Now with malignant effusion. Hypokalemia: moderately severe POA, corrected with IV repletion. Follow BMP, Mg, phos HERMINIA: presumed. However last Cr in system from  (1.6). Continue IVF, may be 2/2 IVVD due to poor PO intake. Renal US showed no evidence of obstruction. Cr improving, monitor BMP Hypotension: borderline, better today. No fever or leukocytosis to suggest infection. Monitor off abx, and cont IVF 
  
PEG tube: not currently in use. If pt unable to maintain adequate PO intake could consider resuming tube feeds 
  
Total time spent:  35 minutes, d/w pulm, CM Time spent in the care of this patient including reviewing records, discussing with nursing and/or other providers on the treatment team, obtaining history and examining the patient, and discussing treatment plans. Care Plan discussed with: Patient, Nursing Staff and >50% of time spent in counseling and coordination of care Discussed:  Care Plan and D/C Planning Prophylaxis:  Change Lovenox to heparin SQ due to renal dysfxn Disposition:  Home w/Family Subjective: Chief Complaint:  Follow up weakness Chart/notes/labs/studies reviewed, patient examined at bedside. Denies worsening SOB. Had small, thin amount of stool. No abd pain. No f/c 
 
 
  
Objective:  
 
 
Vitals:  
 
  
Last 24hrs VS reviewed since prior progress note. Most recent are: 
 
Visit Vitals /68 (BP 1 Location: Left arm, BP Patient Position: At rest) Pulse 97 Temp 98.5 °F (36.9 °C) Resp 16 Ht 5' 9\" (1.753 m) Wt 65.3 kg (144 lb) SpO2 92% BMI 21.27 kg/m² SpO2 Readings from Last 6 Encounters:  
04/15/19 92% 06/09/14 99% 04/09/14 97% 01/30/14 97% 01/18/14 99% O2 Flow Rate (L/min): 4 l/min Intake/Output Summary (Last 24 hours) at 4/15/2019 1719 Last data filed at 4/14/2019 1930 Gross per 24 hour Intake 891.25 ml Output  Net 891.25 ml Exam:  
 
Physical Exam: 
 
Gen:  Chronically ill-appearing. NAD. Pleasant HEENT:  Sclerae nonicteric, hearing intact to voice, mucous membranes moist 
Neck:  Supple, without masses. Resp:  No accessory muscle use, diminished in bases, R>L. No wheezing or rhonchi 
Card: distant, RRR, without m/r/g. No LE edema. Abd:  +bowel sounds, soft, NTTP, nondistended. No HSM. PEG in place. Neuro: Face symmetric, tongue midline, speech fluent, follows commands appropriately Psych:  Alert, oriented x 3. Fair insight Medications Reviewed: (see below) Lab Data Reviewed: (see below) 
 
______________________________________________________________________ Medications:  
 
Current Facility-Administered Medications Medication Dose Route Frequency  levalbuterol (XOPENEX) nebulizer soln 1.25 mg/3 mL  1.25 mg Nebulization Q6H RT  
  dextrose 5% - 0.45% NaCl with KCl 20 mEq/L infusion  75 mL/hr IntraVENous CONTINUOUS  
 sodium chloride (NS) flush 5-40 mL  5-40 mL IntraVENous Q8H  
 sodium chloride (NS) flush 5-40 mL  5-40 mL IntraVENous PRN  
 acetaminophen (TYLENOL) tablet 650 mg  650 mg Oral Q4H PRN  
 morphine injection 2 mg  2 mg IntraVENous Q4H PRN  
 ondansetron (ZOFRAN) injection 4 mg  4 mg IntraVENous Q4H PRN  
 oxyCODONE (ROXICODONE INTENSOL) 20 mg/mL concentrated solution 5 mg  5 mg Oral Q4H PRN  polyethylene glycol (MIRALAX) packet 17 g  17 g Oral DAILY  senna (SENOKOT) tablet 8.6 mg  1 Tab Oral DAILY  sertraline (ZOLOFT) tablet 50 mg  50 mg Oral DAILY Lab Review:  
 
Recent Labs 04/15/19 
7746 04/14/19 
0206 04/13/19 
5864 WBC 7.9 8.7 8.8 HGB 11.6* 11.3* 11.3* HCT 34.8* 33.9* 33.9*  
 193 212 Recent Labs 04/15/19 
8120 04/14/19 
0206 04/13/19 
6332  137 140  
K 4.1 3.8 2.8*  
* 109* 109* CO2 20* 20* 19* * 141* 116* BUN 30* 38* 43* CREA 1.97* 2.09* 2.38* CA 8.7 9.0 8.5 MG 1.8 1.7 1.8 PHOS 2.5* 2.2* 3.2 ALB 2.5* 2.5* 2.5* SGOT  --  13*  --   
ALT  --  8*  -- No components found for: [place] No results for input(s): PH, PCO2, PO2, HCO3, FIO2 in the last 72 hours. No results for input(s): INR in the last 72 hours. No lab exists for component: INREXT, INREXT No results found for: SDES Lab Results Component Value Date/Time Culture result: NO GROWTH 3 DAYS 04/12/2019 12:27 PM  
 
        
___________________________________________________ Attending Physician: Xiao Odell MD

## 2019-04-15 NOTE — PROGRESS NOTES
Nutrition Assessment: 
 
RECOMMENDATIONS/INTERVENTION(S):  
Continue GI lite- add CCD if BG become elevated. Monitor PO intakes, weight, GI status- BMs are mucous per RN. Awaiting path from GI, may help determine POC Add ONS if pt requests. Declined at this time ASSESSMENT:  
4/15: 44 yr old male admitted after attempted EGD and colonoscopy. PMHx: esophageal cancer as well as lung cancer, COPD, PEG, DM, HTN. MST for weight loss and PEG. Pt states he hasn't used the PEG in a \"long time\" and eats PO normally. Pt states appetite is fair and is kind of hungry at the moment 2/2 tray being taken away before he got to finish it. Recommended ONS, pt declined at this time. Doesn't drink any protein drinks at home. Pt claims 20 lbs (12%) weight loss over last 2 months. Significant for time frame. No weight hx in chart to verify. Pt denies n/v currently. States was nauseated earlier. Pt discussed in IDR rounds- pt may need plurex and maybe diversion of GI tract 2/2 mass found. Awaiting path from colonoscopy and POC. Pt currently on GI lite diet. Encourage PO. , 141, 116 mg/dL. A1C 6.5. Phos 2.5. Consult if TF is needed or diet ed for ostomy if placed. SUBJECTIVE/OBJECTIVE:  
Diet Order: (GI lite) % Eaten:  No data found. Pertinent Medications: [x] Reviewed Labs reviewed:  [x] Anthropometrics: Height: 5' 9\" (175.3 cm) Weight: 65.3 kg (144 lb) IBW (%IBW):   ( ) UBW (%UBW):   (  %) BMI: Body mass index is 21.27 kg/m². This BMI is indicative of: 
 
 [] Underweight    [x] Normal    [] Overweight    []  Obesity    []  Extreme Obesity (BMI>40) Estimated Nutrition Needs (Based on): 1948 Kcals/day(BMR(1499x1.3)) , 65 g(-78g/day(1.0-1.2g/kg)) Protein Carbohydrate: At Least 130 g/day  Fluids: 1950 mL/day (1mL/kg rounded to 50 mL) Last BM: 4/15- mucous   []Active     []Hyperactive  []Hypoactive       [] Absent   BS Skin:    [x] Intact   [] Incision  [] Breakdown   [] DTI   [] Tears/Excoriation/Abrasion  []Edema [] Other: Wt Readings from Last 30 Encounters:  
04/12/19 65.3 kg (144 lb) 06/09/14 87.1 kg (192 lb) 04/09/14 87.7 kg (193 lb 6.4 oz) 03/06/14 87.8 kg (193 lb 9.6 oz) 02/10/14 86.5 kg (190 lb 9.6 oz) 01/30/14 87.1 kg (192 lb)  
01/18/14 83.9 kg (185 lb) NUTRITION DIAGNOSES:  
Problem:  Altered GI function Etiology: related to alteration in GI structure/fucntion Signs/Symptoms: as evidenced by PEG, esophageal cancer, possible rectal cancer- awaiting path NUTRITION INTERVENTIONS: 
Meals/Snacks: General/healthful diet Enteral/Parenteral Nutrition: Initiate enteral nutrition Supplements: Commercial supplement GOAL:  
Pt will consume >50% of meals and have BM within 3-5 days Cultural, Amish, or Ethnic Dietary Needs: None LEARNING NEEDS (Diet, Food/Nutrient-Drug Interaction):  
 [x] None Identified 
 [] Identified and Education Provided/Documented 
 [] Identified and Pt declined/was not appropriate [x] Interdisciplinary Care Plan Reviewed/Documented  
 [x] Discharge Needs: TF vs PO - ostomy diet?, high protein [] No Nutrition Related Discharge Needs NUTRITION RISK:  
Pt Is At Nutrition Risk  [x] No Nutrition Risk Identified  [] PT SEEN FOR:  
 []  MD Consult: []Calorie Count []Diabetic Diet Education []Diet Education []Electrolyte Management []General Nutrition Management and Supplements []Management of Tube Feeding []TPN Recommendations [x]  RN Referral:  [x]MST score >=2 
   []Enteral/Parenteral Nutrition PTA []Pregnant: Gestational DM or Multigestation  
              [] Pressure Ulcer 
   
[]  Low BMI      []  Length of Stay       [] Dysphagia Diet     [] Ventilator      [] Follow-Up Previous Recommendations: 
 [] Implemented          [] Not Implemented          [x] Not Applicable Previous Goal: 
 [] Met              [] Progressing Towards Goal              [] Not Progressing Towards Goal   [x] Not Applicable Janis Altman RD Pager: [phone] Office: [phone]

## 2019-04-15 NOTE — PROGRESS NOTES
Name: Herminia Holley Dr: [id number]  
: 1970 Admit Date: 2019 Phone:   Room: 535/01 PCP: Isai Seals MD  MRN: [id number] Date: 4/15/2019  Code: Full Code History of Present Illness: 
Mr. Dot Ayoub is a 43 yo gentleman with a history esophageal cancer, lung cancer, LIZETH (does not wear CPAP), COPD, and new suspected anal/rectal cancer. Found to have a large right sided pleural effusion. Underwent colonoscopy and EGD today. Had his PEG replaced and found to have significant anal/rectal stenosis, suspicious for malignancy. He had a chest x-ray for shortness of breath and found to have a right sided effusion. He tells me has been short of breath for quite some time and attributed it to his COPD. No cough, chest pain, f/c. 
 
Labs reviewed: no new labs today other than a glucose of 130 Interval Hx: Pt was feeling much improved in re: to his breathing yesterday. Today, has noted some DILLON but still feels well at rest.  CXR from today reviewed and with mod to large R effusion. Increased in size from post-thoracentesis tap. I have personally reviewed chest CT from [facility] in 2019. Pt with mod R effusion at that time and mass like lesion in RLL. PET scan in Feb with increased uptake in RLL w mod effusion. New R paracolic soft tissue mass w increased uptake Notes nausea with neb treatments but helps his breathing Past Medical History:  
Diagnosis Date  Cancer [facility])   
 esophagus cancer, right lung cancer  Chronic obstructive pulmonary disease ([facility] [address]  Diabetes ([facility] [address]  Hypertension  Sleep apnea   
 no cpap Past Surgical History:  
Procedure Laterality Date  COLONOSCOPY N/A 2019 COLONOSCOPY performed by Rony Shea MD at [address] HX ENDOSCOPY    
 w/ PEG tube  HX HEENT    
 14years old had eye surgery Family History Problem Relation Age of Onset  Heart Disease Mother  Hypertension Mother  Heart Disease Brother  Heart Disease Maternal Grandmother  Heart Disease Paternal Grandmother Social History Tobacco Use  Smoking status: Former Smoker Last attempt to quit: 2018 Years since quittin.1  Smokeless tobacco: Never Used Substance Use Topics  Alcohol use: No  
 
 
Allergies Allergen Reactions  Codeine Other (comments)  
  hallucinations  Pcn [Penicillins] Hives Current Facility-Administered Medications Medication Dose Route Frequency  levalbuterol (XOPENEX) nebulizer soln 1.25 mg/3 mL  1.25 mg Nebulization Q6H RT  
 ipratropium (ATROVENT) 0.02 % nebulizer solution 0.5 mg  0.5 mg Nebulization Q6H RT  
 dextrose 5% - 0.45% NaCl with KCl 20 mEq/L infusion  75 mL/hr IntraVENous CONTINUOUS  
 heparin (porcine) injection 5,000 Units  5,000 Units SubCUTAneous Q8H  
 sodium chloride (NS) flush 5-40 mL  5-40 mL IntraVENous Q8H  
 sodium chloride (NS) flush 5-40 mL  5-40 mL IntraVENous PRN  
 acetaminophen (TYLENOL) tablet 650 mg  650 mg Oral Q4H PRN  
 morphine injection 2 mg  2 mg IntraVENous Q4H PRN  
 ondansetron (ZOFRAN) injection 4 mg  4 mg IntraVENous Q4H PRN  
 oxyCODONE (ROXICODONE INTENSOL) 20 mg/mL concentrated solution 5 mg  5 mg Oral Q4H PRN  polyethylene glycol (MIRALAX) packet 17 g  17 g Oral DAILY  senna (SENOKOT) tablet 8.6 mg  1 Tab Oral DAILY  sertraline (ZOLOFT) tablet 50 mg  50 mg Oral DAILY REVIEW OF SYSTEMS  
12 point ROS negative except as stated in the HPI. Physical Exam:  
Visit Vitals /65 (BP 1 Location: Left arm, BP Patient Position: At rest) Pulse 99 Temp 97.9 °F (36.6 °C) Resp 16 Ht 5' 9\" (1.753 m) Wt 65.3 kg (144 lb) SpO2 90% BMI 21.27 kg/m² General:  Alert, cooperative, no distress, appears stated age. Head:  Normocephalic, without obvious abnormality, atraumatic. Eyes:  Conjunctivae/corneas clear. Nose: Nares normal. Septum midline. Mucosa normal.   
Throat: Lips, mucosa, and tongue normal.   
Neck: Supple, symmetrical, trachea midline, no adenopathy. Lungs:   Absent on the right and diminished the left, but clear, respirations non-labored at rest.  
Chest wall:  No tenderness or deformity. Heart:  Regular rate and rhythm, S1, S2 normal, no murmur, click, rub or gallop. Abdomen:   Soft, non-tender. PEG in place Extremities: Extremities normal, atraumatic, no cyanosis or edema. Pulses: 2+ and symmetric all extremities. Skin: Skin color, texture, turgor normal. No rashes or lesions Lymph nodes: Cervical, supraclavicular nodes normal.  
Neurologic: Grossly nonfocal  
 
 
Lab Results Component Value Date/Time Sodium 139 04/15/2019 04:54 AM  
 Potassium 4.1 04/15/2019 04:54 AM  
 Chloride 111 (H) 04/15/2019 04:54 AM  
 CO2 20 (L) 04/15/2019 04:54 AM  
 BUN 30 (H) 04/15/2019 04:54 AM  
 Creatinine 1.97 (H) 04/15/2019 04:54 AM  
 Glucose 141 (H) 04/15/2019 04:54 AM  
 Calcium 8.7 04/15/2019 04:54 AM  
 Magnesium 1.8 04/15/2019 04:54 AM  
 Phosphorus 2.5 (L) 04/15/2019 04:54 AM  
 
 
Lab Results Component Value Date/Time WBC 7.9 04/15/2019 04:54 AM  
 HGB 11.6 (L) 04/15/2019 04:54 AM  
 PLATELET 599 22/67/8880 04:54 AM  
 MCV 89.5 04/15/2019 04:54 AM  
 
 
Lab Results Component Value Date/Time AST (SGOT) 13 (L) 04/14/2019 02:06 AM  
 Alk. phosphatase 69 04/14/2019 02:06 AM  
 Protein, total 5.6 (L) 04/14/2019 02:06 AM  
 Albumin 2.5 (L) 04/15/2019 04:54 AM  
 Globulin 3.1 04/14/2019 02:06 AM  
 
 
No results found for: IRON, FE, TIBC, IBCT, PSAT, FERR No results found for: SR, CRP, TALA, ANAIGG, RA, RPR, RPRT, VDRLT, VDRLS, TSH, TSHEXT, TSHEXT No results found for: PH, PHI, PCO2, PCO2I, PO2, PO2I, HCO3, HCO3I, FIO2, FIO2I No results found for: CPK, RCK1, RCK2, RCK3, RCK4, CKNDX, CKND1, TROPT, TROIQ, BNPP, BNP Lab Results Component Value Date/Time Culture result: NO GROWTH 2 DAYS 04/12/2019 12:27 PM  
 
 
No results found for: TOXA1, RPR, HBCM, HBSAG, HAAB, HCAB1, HAAT, G6PD, CRYAC, HIVGT, HIVR, HIV1, HIV12, HIVPC, HIVRPI No results found for: VANCT, CPK No results found for: COLOR, APPRN, SPGRU, HILDA, PROTU, GLUCU, KETU, BILU, BLDU, UROU, TRISH, LEUKU, WBCU, RBCU, UEPI, BACTU, CASTS, UCRY IMPRESSION 
· Pleural effusion: likely malignant effusion. Chemistries on fluid c/w exudative · Esophageal cancer · Lung cancer · Suspected jos/rectal cancer · COPD · Sleep apnea (does not wear CPAP) PLAN 
· Goal sats >90% · Awaiting pleural fluid cytology · Will likely benefit from Pleurx catheter ( we also discussed pleurodesis and serial taps) · Will check chest CT · Will cont with xopenex and stop atrovent and see if nausea improves Jannet Caballero MD

## 2019-04-15 NOTE — PROGRESS NOTES
SURGERY PROGRESS NOTE Admit Date: 2019 POD 3 Days Post-Op Procedure: Procedure(s): 
THORACENTESIS Subjective:  
Feels ok Objective:  
 
Visit Vitals /68 (BP 1 Location: Left arm, BP Patient Position: At rest) Pulse 97 Temp 98.5 °F (36.9 °C) Resp 16 Ht 5' 9\" (1.753 m) Wt 65.3 kg (144 lb) SpO2 92% BMI 21.27 kg/m² Temp (24hrs), Av.8 °F (36.6 °C), Min:97.4 °F (36.3 °C), Max:98.5 °F (36.9 °C) Physical Exam: Abdomen:  Soft. Non-tender, non-distended. Lab Results Component Value Date/Time WBC 7.9 04/15/2019 04:54 AM  
 HGB 11.6 (L) 04/15/2019 04:54 AM  
 Hemoglobin (POC) 17.0 2014 02:22 PM  
 HCT 34.8 (L) 04/15/2019 04:54 AM  
 Hematocrit (POC) 50 2014 02:22 PM  
 PLATELET 074  04:54 AM  
 MCV 89.5 04/15/2019 04:54 AM  
 
Lab Results Component Value Date/Time GFR est non-AA 36 (L) 04/15/2019 04:54 AM  
 GFRNA, POC 40 (L) 2018 04:15 PM  
 GFR est AA 44 (L) 04/15/2019 04:54 AM  
 GFRAA, POC 49 (L) 2018 04:15 PM  
 Creatinine 1.97 (H) 04/15/2019 04:54 AM  
 Creatinine (POC) 1.8 (H) 2018 04:15 PM  
 BUN 30 (H) 04/15/2019 04:54 AM  
 BUN (POC) 25 (H) 2014 02:22 PM  
 Sodium 139 04/15/2019 04:54 AM  
 Sodium (POC) 137 2014 02:22 PM  
 Potassium 4.1 04/15/2019 04:54 AM  
 Potassium (POC) 3.8 2014 02:22 PM  
 Chloride 111 (H) 04/15/2019 04:54 AM  
 Chloride (POC) 105 2014 02:22 PM  
 CO2 20 (L) 04/15/2019 04:54 AM  
 Magnesium 1.8 04/15/2019 04:54 AM  
 Phosphorus 2.5 (L) 04/15/2019 04:54 AM  
 
 
Assessment:  
 
Active Problems: Dyspnea (2019) Plan/Recommendations/Medical Decision Making:  
CT chest today with significant effusion - scheduled for placement of pleurax cath tomorrow Will plan for diverting ostomy Wednesday morning Discussed plan with the patient and his wife

## 2019-04-15 NOTE — PROGRESS NOTES
Reason for Admission:   dyspnea RRAT Score:  3-low Plan for utilizing home health:  No HH needs at this time Current Advanced Directive/Advance Care Plan:   Not currently on file. Pt stated that he would like to have an advance directive completed during this hospitalization. Likelihood of Readmission:  Low to moderate Transition of Care Plan: Pt may discharge home Pt is a 45 yo male admitted for dyspnea. Pt was accompanied by wife who is at bedside. CM reviewed demographics with Pt. Pt provided Cm with address: [facility] [place] [employer] [id number]. Wife Brea Littlejohn he would like to list as his main point of contact: [phone]. Pt reports to manage ADLs including driving. Pt has access to a walker and cane at home. Pt reports that he would like to have his prescriptions sent to [facility] on [address]. Care Management Interventions PCP Verified by CM: Yes(Charles Hui, No NN) Mode of Transport at Discharge: Other (see comment)(wife; private vehicle) Transition of Care Consult (CM Consult): Discharge Planning Physical Therapy Consult: No 
Occupational Therapy Consult: No 
Speech Therapy Consult: No 
Current Support Network: Lives with Spouse(wife; Brea Littlejohn) Confirm Follow Up Transport: Self Discharge Location Discharge Placement: Home [address], BS, [address] [facility]

## 2019-04-16 NOTE — PROGRESS NOTES
Bedside and Verbal shift change report given to Ayde RN (oncoming nurse) by Price Place RN (offgoing nurse). Report included the following information SBAR, Kardex, MAR, Accordion and Recent Results.

## 2019-04-16 NOTE — PROGRESS NOTES
TRANSFER - IN REPORT: 
 
Verbal report received from Floor RN  on [facility]  being received from [phone] for ordered procedure Report consisted of patients Situation, Background, Assessment and  
Recommendations(SBAR). Information from the following report(s) SBAR was reviewed with the receiving nurse. Opportunity for questions and clarification was provided. Assessment completed upon patients arrival to unit and care assumed.

## 2019-04-16 NOTE — PROGRESS NOTES
Today's Updates/Plan 1. Pulm, and GI following 2. CM will continue to follow for discharge planning. Discharge plan 1. No anticipated discharge needs. Will continue to follow. [address], BS, [address] [facility]

## 2019-04-16 NOTE — WOUND CARE
Wound care consult: 
Initial visit: Consulted for \"please li for ostomy, will need teaching post op. \" Diverting ostomy planned for 4/16/19. Patient in bed for assessment and marking. Stoma marking:  Introduced patient to self and services. Reviewed normal anatomy and discussed surgical procedure/living with ostomy. Patient asked appropriate questions. Marked abdomen in the left lower abdominal quadrant, away from creases, folds, and umbilicus; within visual field and rectus muscle; marked with a large dot, using an indelible marker. Marked areas checked while patient lying and sitting. Marking covered with transparent dressing and dated. Recommendations/Plan Per patient, his wife Katlin Lake will be helping with his care after surgery and will be available for teaching. Plan to follow-up after surgery.

## 2019-04-16 NOTE — PROGRESS NOTES
[facility] 
[address] 
[phone] Medical Progress Note NAME: Nora Herrmann :  1970 MRM:  [id number] Date/Time: 2019  1:32 PM 
 
  
Assessment and Plan: 1. Dyspnea: Due to large pleural effusion, s/p 2.1L thoracentesis on admission however fluid reaccumulating. Exudative and likely malignant per pulm. Follow up cytology. S/p PleurX catheter. Monitor closely. 
  
2.  Rectal pain / ?anorectal carcinoma: biopsy taken during colonoscopy showed benign colonic mucosa. Reviewed GI and gen surgical note and no plan to repeat Bx, but biopsy of any peritoneal findings during diverting colostomy procedure. . 
 
3.  Esophageal cancer: stricture noted on EGD. Outpatient follow up, s/p XRT. CT showed mild ascites. There is suggestion of peritoneal carcinomatosis in the left upper abdomen. Poor prognosis 
  
4.  Lung CA: s/p chemo. Outpatient follow up. Now with malignant effusion.  
  
5. CKD. Renal US showed no evidence of obstruction. Cr stable, monitor BMP 
  
6. PEG tube: not currently in use. If pt unable to maintain adequate PO intake could consider resuming tube feeds Subjective: Chief Complaint:  Follow up of pt who was admitted with SOB. Denies SOB. ROS: 
(bold if positive, if negative) Tolerating PT  Tolerating Diet Objective:  
 
Last 24hrs VS reviewed since prior progress note. Most recent are: 
 
Visit Vitals /71 (BP 1 Location: Right arm, BP Patient Position: At rest) Pulse 95 Temp 97.4 °F (36.3 °C) Resp 16 Ht 5' 9\" (1.753 m) Wt 65.3 kg (144 lb) SpO2 95% BMI 21.27 kg/m² SpO2 Readings from Last 6 Encounters:  
19 95% 14 99% 14 97% 14 97% 14 99% O2 Flow Rate (L/min): 2 l/min No intake or output data in the 24 hours ending 19 1332 Physical Exam: 
 
Gen:  Well-developed, well-nourished, in no acute distress HEENT:  Pink conjunctivae, PERRL, hearing intact to voice, moist mucous membranes Neck:  Supple, without masses, thyroid non-tender Resp:  No accessory muscle use, decreased air entry more on the RT Card:  No murmurs, normal S1, S2 without thrills, bruits or peripheral edema Abd:  Soft, non-tender, non-distended, normoactive bowel sounds are present, no palpable organomegaly and no detectable hernias Lymph:  No cervical or inguinal adenopathy Musc:  No cyanosis or clubbing Skin:  No rashes or ulcers, skin turgor is good Neuro:  Cranial nerves are grossly intact, no focal motor weakness, follows commands appropriately Psych:  Good insight, oriented to person, place and time, alert 
__________________________________________________________________ Medications Reviewed: (see below) Medications:  
 
Current Facility-Administered Medications Medication Dose Route Frequency  levalbuterol (XOPENEX) nebulizer soln 1.25 mg/3 mL  1.25 mg Nebulization Q6H RT  
 dextrose 5% - 0.45% NaCl with KCl 20 mEq/L infusion  75 mL/hr IntraVENous CONTINUOUS  
 sodium chloride (NS) flush 5-40 mL  5-40 mL IntraVENous Q8H  
 sodium chloride (NS) flush 5-40 mL  5-40 mL IntraVENous PRN  
 acetaminophen (TYLENOL) tablet 650 mg  650 mg Oral Q4H PRN  
 morphine injection 2 mg  2 mg IntraVENous Q4H PRN  
 ondansetron (ZOFRAN) injection 4 mg  4 mg IntraVENous Q4H PRN  
 oxyCODONE (ROXICODONE INTENSOL) 20 mg/mL concentrated solution 5 mg  5 mg Oral Q4H PRN  polyethylene glycol (MIRALAX) packet 17 g  17 g Oral DAILY  senna (SENOKOT) tablet 8.6 mg  1 Tab Oral DAILY  sertraline (ZOLOFT) tablet 50 mg  50 mg Oral DAILY Lab Data Reviewed: (see below) Lab Review:  
 
Recent Labs 04/16/19 
3811 04/15/19 
0683 04/14/19 
0206 WBC 7.6 7.9 8.7 HGB 11.1* 11.6* 11.3* HCT 33.5* 34.8* 33.9*  
 189 193 Recent Labs 04/16/19 
0076 04/15/19 
5979 04/14/19 
0206  139 137  
K 4.4 4.1 3.8 * 111* 109* CO2 20* 20* 20* * 141* 141* BUN 26* 30* 38* CREA 1.84* 1.97* 2.09* CA 8.9 8.7 9.0 MG 1.7 1.8 1.7 PHOS 2.6 2.5* 2.2* ALB 2.3* 2.5* 2.5* TBILI  --   --  0.3 SGOT  --   --  13* ALT  --   --  8* Lab Results Component Value Date/Time Glucose (POC) 130 (H) 04/12/2019 07:41 AM  
 Glucose (POC) 251 (H) 01/18/2014 02:22 PM  
 
No results for input(s): PH, PCO2, PO2, HCO3, FIO2 in the last 72 hours. No results for input(s): INR in the last 72 hours. No lab exists for component: INREXT All Micro Results Procedure Component Value Units Date/Time CULTURE, BODY FLUID [[id number]] Collected:  04/12/19 1227 Order Status:  Completed Specimen:  Pleural Fluid Updated:  04/16/19 4177 Special Requests: NO SPECIAL REQUESTS     
  GRAM STAIN 3+ WBCS SEEN     
   NO ORGANISMS SEEN Culture result: NO GROWTH 4 DAYS I have reviewed notes of prior 24hr. Other pertinent lab: Total time spent with patient: 28 Care Plan discussed with: Patient, Nursing Staff and >50% of time spent in counseling and coordination of care Discussed:  Care Plan Prophylaxis:  SCD's Disposition:  Home w/Family 
        
___________________________________________________ Attending Physician: Srinivas Ramirez MD

## 2019-04-16 NOTE — PROGRESS NOTES
Bedside and Verbal shift change report given to Maureen Del Toro RN (oncoming nurse) by Ulices Palm RN (offgoing nurse). Report included the following information SBAR, Kardex, MAR and Accordion.

## 2019-04-16 NOTE — PROGRESS NOTES
[address] TRANSFER - IN REPORT: 
 
[facility]name[facility] on Amber Iniguez  being received from  EP (unit) for routine progression of care Report consisted of patients Situation, Background, Assessment and  
Recommendations(SBAR). Information from the following report(s) SBAR was reviewed with the receiving nurse. Opportunity for questions and clarification was provided. Assessment completed upon patients arrival to unit and care assumed. [address] Pt voices understanding of post procedure bedrest instructions. [id number] Spoken to family , and will post op educate on the [address] TRANSFER - OUT REPORT: 
 
Verbal report given to Jaden 5F (name) on Amber Iniguez  being transferred to [address] [place] [address] (unit) for routine progression of care Report consisted of patients Situation, Background, Assessment and  
Recommendations(SBAR). Information from the following report(s) SBAR was reviewed with the receiving nurse. Lines:  
Peripheral IV 04/16/19 Left Wrist (Active) Opportunity for questions and clarification was provided. Patient transported with: 
 Registered Nurse 
 
 
9151 Post aspirs teaching and instruictions performed with family

## 2019-04-16 NOTE — ROUTINE PROCESS
Patient has allergy to penicillin, reaction hives. Verified with Dr. Gilbert correia to give 2 grams ancef iv and 50 my benadryl.

## 2019-04-16 NOTE — PROGRESS NOTES
SURGERY PROGRESS NOTE Admit Date: 2019 POD 4 Days Post-Op Procedure: Procedure(s): 
THORACENTESIS Subjective: No acute events Getting pleural drain today Objective:  
 
Visit Vitals /73 (BP 1 Location: Left arm, BP Patient Position: At rest) Pulse 98 Temp 97.7 °F (36.5 °C) Resp 16 Ht 5' 9\" (1.753 m) Wt 65.3 kg (144 lb) SpO2 93% BMI 21.27 kg/m² Temp (24hrs), Av °F (36.7 °C), Min:97.7 °F (36.5 °C), Max:98.5 °F (36.9 °C) Physical Exam: Abdomen:  Soft. Non-tender, non-distended. Lab Results Component Value Date/Time WBC 7.6 2019 03:43 AM  
 HGB 11.1 (L) 2019 03:43 AM  
 Hemoglobin (POC) 17.0 2014 02:22 PM  
 HCT 33.5 (L) 2019 03:43 AM  
 Hematocrit (POC) 50 2014 02:22 PM  
 PLATELET 030  03:43 AM  
 MCV 90.5 2019 03:43 AM  
 
Lab Results Component Value Date/Time GFR est non-AA 39 (L) 2019 03:43 AM  
 GFRNA, POC 40 (L) 2018 04:15 PM  
 GFR est AA 47 (L) 2019 03:43 AM  
 GFRAA, POC 49 (L) 2018 04:15 PM  
 Creatinine 1.84 (H) 2019 03:43 AM  
 Creatinine (POC) 1.8 (H) 2018 04:15 PM  
 BUN 26 (H) 2019 03:43 AM  
 BUN (POC) 25 (H) 2014 02:22 PM  
 Sodium 138 2019 03:43 AM  
 Sodium (POC) 137 2014 02:22 PM  
 Potassium 4.4 2019 03:43 AM  
 Potassium (POC) 3.8 2014 02:22 PM  
 Chloride 112 (H) 2019 03:43 AM  
 Chloride (POC) 105 2014 02:22 PM  
 CO2 20 (L) 2019 03:43 AM  
 Magnesium 1.7 2019 03:43 AM  
 Phosphorus 2.6 2019 03:43 AM  
 
 
Assessment:  
 
Active Problems: Dyspnea (2019) Plan/Recommendations/Medical Decision Making: Will plan for diverting ostomy tomorrow morning Discussed with patient and his wife yesterday and with his wife today NPO at midnight for OR in am

## 2019-04-16 NOTE — PROGRESS NOTES
Name: Herminia Holley Dr: [address]  
: 1970 Admit Date: 2019 Phone:   Room: 535/01 PCP: Jennie Lawson MD  MRN: [id number] Date: 2019  Code: Full Code History of Present Illness: 
Mr. Zara Parisi is a 45 yo gentleman with a history esophageal cancer, lung cancer, LIZETH (does not wear CPAP), COPD, and new suspected anal/rectal cancer. Found to have a large right sided pleural effusion. Underwent colonoscopy and EGD today. Had his PEG replaced and found to have significant anal/rectal stenosis, suspicious for malignancy. He had a chest x-ray for shortness of breath and found to have a right sided effusion. He tells me has been short of breath for quite some time and attributed it to his COPD. No cough, chest pain, f/c. 
 
Labs reviewed: no new labs today other than a glucose of 130 I have personally reviewed [facility] Pt with mod R effusion at that time and mass like lesion in RLL. PET scan in Feb with increased uptake in RLL w mod effusion. New R paracolic soft tissue mass w increased uptake Interval Hx: 
Still with DILLON. No sig change in breathing. CT from yesterday reviewed. Effusion occupying much of the R hemithorax w shift of the mediastinum to L. Less nausea with removal of ipratroprium from neb treatments. Past Medical History:  
Diagnosis Date  Cancer [facility])   
 esophagus cancer, right lung cancer  Chronic obstructive pulmonary disease [facility] [address]  Diabetes [facility] [address]  Hypertension  Sleep apnea   
 no cpap Past Surgical History:  
Procedure Laterality Date  COLONOSCOPY N/A 2019 COLONOSCOPY performed by Darnell Sandifer, MD at [address] HX ENDOSCOPY    
 w/ PEG tube  HX HEENT    
 14years old had eye surgery Family History Problem Relation Age of Onset  Heart Disease Mother  Hypertension Mother  Heart Disease Brother  Heart Disease Maternal Grandmother  Heart Disease Paternal Grandmother Social History Tobacco Use  Smoking status: Former Smoker Last attempt to quit: 2018 Years since quittin.1  Smokeless tobacco: Never Used Substance Use Topics  Alcohol use: No  
 
 
Allergies Allergen Reactions  Codeine Other (comments)  
  hallucinations  Pcn [Penicillins] Hives Current Facility-Administered Medications Medication Dose Route Frequency  levalbuterol (XOPENEX) nebulizer soln 1.25 mg/3 mL  1.25 mg Nebulization Q6H RT  
 dextrose 5% - 0.45% NaCl with KCl 20 mEq/L infusion  75 mL/hr IntraVENous CONTINUOUS  
 sodium chloride (NS) flush 5-40 mL  5-40 mL IntraVENous Q8H  
 sodium chloride (NS) flush 5-40 mL  5-40 mL IntraVENous PRN  
 acetaminophen (TYLENOL) tablet 650 mg  650 mg Oral Q4H PRN  
 morphine injection 2 mg  2 mg IntraVENous Q4H PRN  
 ondansetron (ZOFRAN) injection 4 mg  4 mg IntraVENous Q4H PRN  
 oxyCODONE (ROXICODONE INTENSOL) 20 mg/mL concentrated solution 5 mg  5 mg Oral Q4H PRN  polyethylene glycol (MIRALAX) packet 17 g  17 g Oral DAILY  senna (SENOKOT) tablet 8.6 mg  1 Tab Oral DAILY  sertraline (ZOLOFT) tablet 50 mg  50 mg Oral DAILY REVIEW OF SYSTEMS  
12 point ROS negative except as stated in the HPI. Physical Exam:  
Visit Vitals /73 (BP 1 Location: Left arm, BP Patient Position: At rest) Pulse 98 Temp 97.7 °F (36.5 °C) Resp 16 Ht 5' 9\" (1.753 m) Wt 65.3 kg (144 lb) SpO2 93% BMI 21.27 kg/m² General:  Alert, cooperative, no distress, appears stated age. Head:  Normocephalic, without obvious abnormality, atraumatic. Eyes:  Conjunctivae/corneas clear. Nose: Nares normal. Septum midline. Mucosa normal.   
Throat: Lips, mucosa, and tongue normal.   
Neck: Supple, symmetrical, trachea midline, no adenopathy.   
Lungs:   Absent on the right and diminished the left, but clear, respirations non-labored at rest.  
Chest wall:  No tenderness or deformity. Heart:  Regular rate and rhythm, S1, S2 normal, no murmur, click, rub or gallop. Abdomen:   Soft, non-tender. PEG in place Extremities: Extremities normal, atraumatic, no cyanosis or edema. Pulses: 2+ and symmetric all extremities. Skin: Skin color, texture, turgor normal. No rashes or lesions Lymph nodes: Cervical, supraclavicular nodes normal.  
Neurologic: Grossly nonfocal  
 
 
Lab Results Component Value Date/Time Sodium 138 04/16/2019 03:43 AM  
 Potassium 4.4 04/16/2019 03:43 AM  
 Chloride 112 (H) 04/16/2019 03:43 AM  
 CO2 20 (L) 04/16/2019 03:43 AM  
 BUN 26 (H) 04/16/2019 03:43 AM  
 Creatinine 1.84 (H) 04/16/2019 03:43 AM  
 Glucose 138 (H) 04/16/2019 03:43 AM  
 Calcium 8.9 04/16/2019 03:43 AM  
 Magnesium 1.7 04/16/2019 03:43 AM  
 Phosphorus 2.6 04/16/2019 03:43 AM  
 
 
Lab Results Component Value Date/Time WBC 7.6 04/16/2019 03:43 AM  
 HGB 11.1 (L) 04/16/2019 03:43 AM  
 PLATELET 265 15/69/5724 03:43 AM  
 MCV 90.5 04/16/2019 03:43 AM  
 
 
Lab Results Component Value Date/Time AST (SGOT) 13 (L) 04/14/2019 02:06 AM  
 Alk. phosphatase 69 04/14/2019 02:06 AM  
 Protein, total 5.6 (L) 04/14/2019 02:06 AM  
 Albumin 2.3 (L) 04/16/2019 03:43 AM  
 Globulin 3.1 04/14/2019 02:06 AM  
 
 
No results found for: IRON, FE, TIBC, IBCT, PSAT, FERR No results found for: SR, CRP, TALA, ANAIGG, RA, RPR, RPRT, VDRLT, VDRLS, TSH, TSHEXT, TSHEXT No results found for: PH, PHI, PCO2, PCO2I, PO2, PO2I, HCO3, HCO3I, FIO2, FIO2I No results found for: CPK, RCK1, RCK2, RCK3, RCK4, CKNDX, CKND1, TROPT, TROIQ, BNPP, BNP Lab Results Component Value Date/Time Culture result: NO GROWTH 3 DAYS 04/12/2019 12:27 PM  
 
 
No results found for: TOXA1, RPR, HBCM, HBSAG, HAAB, HCAB1, HAAT, G6PD, CRYAC, HIVGT, HIVR, HIV1, HIV12, HIVPC, HIVRPI No results found for: VANCT, CPK 
 
 No results found for: COLOR, APPRN, SPGRU, HILDA, PROTU, GLUCU, KETU, BILU, BLDU, UROU, TRISH, LEUKU, WBCU, RBCU, UEPI, BACTU, CASTS, UCRY IMPRESSION 
· Pleural effusion: likely malignant effusion. Chemistries on fluid c/w exudative effusion. Cytology pending · Esophageal cancer · Lung cancer · Suspected jos/rectal cancer · COPD · Sleep apnea (does not wear CPAP) PLAN 
· Goal sats >90% · Awaiting pleural fluid cytology · Consult IR to place pleurx catheter. Will start draining 1 liter daily · Cont with xopenex nebs.  
 
 
Ponce Weiss MD

## 2019-04-16 NOTE — PROGRESS NOTES
Bedside and Verbal shift change report given to Bel Macdonald RN (oncoming nurse) by Casi Mayes RN (offgoing nurse). Report included the following information SBAR, Kardex, MAR and Accordion.

## 2019-04-16 NOTE — PROGRESS NOTES
GI note I discussed his case with Dr. Lauren Musa yesterday His pathology is benign, and I suspect this represents false negative / sampling error as opposed to a true negative result; my impression is that he has a malignant process at the anus. Dr. Lauren Musa and I reviewed his case and discussed the value of repeat endoscopic anal biopsies in this case. She notes [facility] available at [facility] which would suggest the presence of peritoneal carcinomatosis, and she notes on recent PET scan activity at the anorectal area supporting my impression of malignant process there. Given what appears to be advanced / peritoneal carcinomatosis she and I negotiated that repeat biopsy would not be entertained as it will not likely alter his treatment plan. Whether the anal process is benign or malignant, surgical intervention is required in either case as he has evolving anal outlet obstruction. I would support a plan to proceed with diverting colostomy for prevention of large bowel obstruction; and biopsy of any peritoneal findings.  
 
Tabitha Fernandes MD

## 2019-04-16 NOTE — PROGRESS NOTES
Bedside and Verbal shift change report given to Yanet Harper (oncoming nurse) by Tracey Patel (offgoing nurse). Report included the following information SBAR, Kardex, Intake/Output, MAR, Accordion and Recent Results.

## 2019-04-17 NOTE — CONSULTS
Palliative Medicine Consult Patient Name: Tamara Molina YOB: 1970 Date of Initial Consult: 4/12/2019 Reason for Consult: Dr. Samantha Rodgers Requesting Provider: Overwhelming symptoms Primary Care Physician: Queta Licea MD 
  
 SUMMARY:  
Tamara Molina is a 44 y.o. with a past history of lung cancer, esophageal cancer hypertension, COPD, status post PEG, who was admitted on 4/12/2019 from home with a diagnosis of newly diagnosed likely anal or rectal CA. Current medical issues leading to Palliative Medicine involvement include: Overwhelming symptoms. History of present illness/past medical history patient is a 39-year-old gentleman with a history of esophageal cancer as well as lung cancer. He is followed Dr. Lolly Davidson from [address] [place] [address]. He was supposed to see [facility] in approximately 2 weeks for follow-up on his esophageal cancer. He already has a PEG placed secondary to his esophageal cancer. He states he had been on some type of \"oral chemotherapy agent but this was causing significant side effects and so had recently been stopped. He is been having significant anal type pain and had seen Dr. Samantha Rodgers as an outpatient. He presents today for attempts at a colonoscopy as well as repeat EGD. Unfortunately EGD showed what appears to be malignant narrowing at 37 to 40 cm. In addition, attempted a colonoscopy were not able to be completed secondary to significant needle stricture, sclerosis. Even a pediatric colonoscope could not pass the anal stenosis. Ultimately an upper GI scope was used to bypass into the rectal area which profuse stool was seen throughout. The anal canal is friable firm and was concerning for anal carcinoma. He was having significant pain and discomfort but that has seemed to improve by the time I have seen the patient. He is waiting Colorectal surgeon evaluation.   He admits that his bowel movements have been less over the last couple months and describes very small stools. His breathing has been worse over the last several weeks. He had used tramadol at home to help with his pain but that gave no relief. Social history he is  to Denis. His sister, Dimitris Rick is also very involved and is in the room. PALLIATIVE DIAGNOSES:  
1. Cancer associated pain 2. Shortness of breathimproved after thoracentesis on the right with 2 L removed 3. 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 4. Esophageal cancer 5. Lung cancer 6. Advance care planning 7. DNR discussion PLAN:  
1. Met with patient, spouse, and sister. Patient just returned from surgery and is still lethargic. Unfortunately, surgical team the found diffuse carcinomatosis with bulky partially obstructing disease at the hepatic flexure. A diverting ileostomy was completed. Dr. Huma Barrow had just met with the family and reviewed the findings. Dr. Danilo Richmond had also talk with the family. Unfortunately, very limited options going forward. Family very tearful but patient himself unable to fully be part of the discussion. Family seems to understand hospice may be the only reasonable option at this point. 2. Goals of care we plan on meeting tomorrow with patient and family. I will asked the hospice liaison to come with me to the meeting. We will review all options at that time. His wife did ask if the [facility] oncology team could potentially offer an opinion. I will see if I can reach out to the oncology team. 
3. Advance care planning he has completed an advanced medical directive. He has assigned his wife as primary medical power of  and his sister at secondary medical power of .   I have asked his family to bring a copy to the hospital. 
 4. CODE STATUS did not readdress CODE STATUS today as patient was not able to participate in the discussion. I certainly will readdress tomorrow during the family meeting. 5. Symptom management he currently has IV morphine and p.o. oxycodone ordered for pain. We will not make any changes today certainly may need to address in the future. 6. Psychosocialno spiritual concerns. He does have very good support from his spouse and sister. 7. Discussed with Dr. Leita Holter, bedside nurse, case management, Dr. Carey Lamas 8. Initial consult note routed to primary continuity provider 9. Communicated plan of care with: Palliative IDT 
 
 
 GOALS OF CARE / TREATMENT PREFERENCES:  
[====Goals of Care====] GOALS OF CARE: 
Patient/Health Care Proxy Stated Goals: Prolong life TREATMENT PREFERENCES:  
Code Status: Full Code Advance Care Planning: 
Advance Care Planning 4/13/2019 Confirm Advance Directive Yes, not on file Medical Interventions: Full interventions Other Instructions:  
Artificially Administered Nutrition: Feeding tube long-term, if indicated The palliative care team has discussed with patient / health care proxy about goals of care / treatment preferences for patient. 
[====Goals of Care====] HISTORY:  
 
History obtained from: Chart, spouse, sister CHIEF COMPLAINT: Rectal pain HPI/SUBJECTIVE: The patient is:  
[x] Verbal and participatory [] Non-participatory due to:  
Patient is feeling much better. He has had a thoracentesis that removed 2 L of fluid. Feels like his breathing is much improved. 4/17 patient is just returned from the operating room and is still a little bit lethargic. Clinical Pain Assessment (nonverbal scale for severity on nonverbal patients):  
Clinical Pain Assessment Severity: 3 Location: Anal area Character: Throbbing and aching Duration: Months Effect: Difficult to have a bowel movement Factors: Constipation Frequency: Intermittent Adult Nonverbal Pain Scale Face: No particular expression or smile Activity (Movement): Lying quietly, normal position Guarding: Lying quietly, no positioning of hands over areas of body Physiology (Vital Signs): Stable vital signs Respiratory: Baseline RR/SpO2 compliant with ventilator Total Score: 0 
 
 
 FUNCTIONAL ASSESSMENT:  
 
Palliative Performance Scale (PPS): PPS: 70 PSYCHOSOCIAL/SPIRITUAL SCREENING:  
 
Advance Care Planning: 
Advance Care Planning 4/13/2019 Confirm Advance Directive Yes, not on file Any spiritual / Yarsani concerns: 
[] Yes /  [x] No 
 
Caregiver Burnout: 
[] Yes /  [x] No /  [] No Caregiver Present Anticipatory grief assessment:  
[x] Normal  / [] Maladaptive ESAS Anxiety: Anxiety: 0 
 
ESAS Depression: Depression: 0 REVIEW OF SYSTEMS:  
 
Positive and pertinent negative findings in ROS are noted above in HPI. The following systems were [x] reviewed / [] unable to be reviewed as noted in HPI Other findings are noted below. Systems: constitutional, ears/nose/mouth/throat, respiratory, gastrointestinal, genitourinary, musculoskeletal, integumentary, neurologic, psychiatric, endocrine. Positive findings noted below. Modified ESAS Completed by: provider Fatigue: 2 Drowsiness: 0 Depression: 0 Pain: 3 Anxiety: 0 Nausea: 0 Anorexia: 3 Dyspnea: 4 Constipation: Yes Stool Occurrence(s): 1 PHYSICAL EXAM:  
 
From RN flowsheet: 
Wt Readings from Last 3 Encounters:  
04/12/19 144 lb (65.3 kg) 06/09/14 192 lb (87.1 kg) 04/09/14 193 lb 6.4 oz (87.7 kg) Blood pressure 108/74, pulse (!) 103, temperature 97.6 °F (36.4 °C), resp. rate 18, height 5' 9\" (1.753 m), weight 144 lb (65.3 kg), SpO2 93 %. Pain Scale 1: Numeric (0 - 10) Pain Intensity 1: 6 Pain Onset 1: post op Pain Location 1: Abdomen Pain Orientation 1: Left, Lower, Other (comment)(at ileostomy site) Pain Description 1: Aching Pain Intervention(s) 1: Medication (see MAR) Last bowel movement, if known:  
 
Constitutional: Thin, alert and oriented, sleepy Eyes: pupils equal, anicteric ENMT: no nasal discharge, moist mucous membranes Cardiovascular: regular rhythm, distal pulses intact Respiratory: breathing not labored, decreased on the right Gastrointestinal: soft, PEG in place, ileostomy noted. Musculoskeletal: no deformity, no tenderness to palpation Skin: warm, dry Neurologic: following commands, moving all extremities Psychiatric: full affect, no hallucinations Other: 
 
 
 HISTORY:  
 
Active Problems: Dyspnea (2019) Past Medical History:  
Diagnosis Date  Cancer [facility])   
 esophagus cancer, right lung cancer  Chronic obstructive pulmonary disease ([facility] [address] [facility]  Diabetes ([facility] [facility]  Hypertension  Sleep apnea   
 no cpap Past Surgical History:  
Procedure Laterality Date  COLONOSCOPY N/A 2019 COLONOSCOPY performed by Linsey Slater MD at [address] HX ENDOSCOPY    
 w/ PEG tube  HX HEENT    
 14years old had eye surgery  IR INSERT CATH PLEURAL INDWELL  2019 Family History Problem Relation Age of Onset  Heart Disease Mother  Hypertension Mother  Heart Disease Brother  Heart Disease Maternal Grandmother  Heart Disease Paternal Grandmother History reviewed, no pertinent family history. Social History Tobacco Use  Smoking status: Former Smoker Last attempt to quit: 2018 Years since quittin.1  Smokeless tobacco: Never Used Substance Use Topics  Alcohol use: No  
 
Allergies Allergen Reactions  Codeine Other (comments)  
  hallucinations  Pcn [Penicillins] Hives States \"it's a childhood allergy\" Current Facility-Administered Medications Medication Dose Route Frequency  lidocaine (PF) (XYLOCAINE) 10 mg/mL (1 %) injection 0.1 mL  0.1 mL SubCUTAneous PRN  
  morphine injection 2 mg  2 mg IntraVENous Q2H PRN  
 levalbuterol (XOPENEX) nebulizer soln 1.25 mg/3 mL  1.25 mg Nebulization Q6H RT  
 dextrose 5% - 0.45% NaCl with KCl 20 mEq/L infusion  75 mL/hr IntraVENous CONTINUOUS  
 sodium chloride (NS) flush 5-40 mL  5-40 mL IntraVENous Q8H  
 sodium chloride (NS) flush 5-40 mL  5-40 mL IntraVENous PRN  
 acetaminophen (TYLENOL) tablet 650 mg  650 mg Oral Q4H PRN  
 ondansetron (ZOFRAN) injection 4 mg  4 mg IntraVENous Q4H PRN  
 oxyCODONE (ROXICODONE INTENSOL) 20 mg/mL concentrated solution 5 mg  5 mg Oral Q4H PRN  polyethylene glycol (MIRALAX) packet 17 g  17 g Oral DAILY  senna (SENOKOT) tablet 8.6 mg  1 Tab Oral DAILY  sertraline (ZOLOFT) tablet 50 mg  50 mg Oral DAILY  
 
 
 
 LAB AND IMAGING FINDINGS:  
 
Lab Results Component Value Date/Time WBC 8.8 04/17/2019 03:42 AM  
 HGB 11.6 (L) 04/17/2019 03:42 AM  
 PLATELET 872 61/86/3555 03:42 AM  
 
Lab Results Component Value Date/Time Sodium 140 04/17/2019 03:42 AM  
 Potassium 4.7 04/17/2019 03:42 AM  
 Chloride 112 (H) 04/17/2019 03:42 AM  
 CO2 22 04/17/2019 03:42 AM  
 BUN 26 (H) 04/17/2019 03:42 AM  
 Creatinine 1.97 (H) 04/17/2019 03:42 AM  
 Calcium 8.8 04/17/2019 03:42 AM  
 Magnesium 1.7 04/16/2019 03:43 AM  
 Phosphorus 2.6 04/16/2019 03:43 AM  
  
Lab Results Component Value Date/Time AST (SGOT) 13 (L) 04/14/2019 02:06 AM  
 Alk. phosphatase 69 04/14/2019 02:06 AM  
 Protein, total 5.6 (L) 04/14/2019 02:06 AM  
 Albumin 2.3 (L) 04/16/2019 03:43 AM  
 Globulin 3.1 04/14/2019 02:06 AM  
 
No results found for: INR, PTMR, PTP, PT1, PT2, APTT No results found for: IRON, FE, TIBC, IBCT, PSAT, FERR No results found for: PH, PCO2, PO2 No components found for: [place] No results found for: CPK, CKMB Total time: 35 
Counseling / coordination time, spent as noted above: 30 
> 50% counseling / coordination?: yes Prolonged service was provided for  []30 min   []75 min in face to face time in the presence of the patient, spent as noted above. Time Start:  
Time End:  
Note: this can only be billed with 39309 (initial) or 21224 (follow up). If multiple start / stop times, list each separately.

## 2019-04-17 NOTE — PERIOP NOTES
Pt fall protocol Yellow arm band on patient, yellow non skid socks on  
bed in low position, all side rails up, call bell in reach Pt  & family instructed in \"call don't fall\" protocol Use your call bell,and wait for assistance, staff not family will assist you to get up &   move about Pt & family verbalize understanding of fall precautions and \"call don't fall\" protocol 2336 - Dr Paxton Harrison at bedside, no type and screen needed for this procedure. Pt with non-accessed implanted port to left upper chest, states \"since it was inserted at [facility], they won't use it here\" Pt with 22 G Angio to left wrist, flushed well, runs well. Multiple areas of bruising to BL forearms. Pt with clamped pleural drain to right chest, drainage present on dressing but dressing remains occlusive. Clamped GT present to abdomen. Abd clipped per Terra RN, ostomy marking in place, Dr Paxton Harrison aware.

## 2019-04-17 NOTE — PROGRESS NOTES
Today's Updates/Plan 1. Pulm, and GI following 2. Palliative has been consulted. Planning to meet with Pt and family  tomorrow once Pt more alert and oriented. 3. CM will continue to follow for discharge planning.  
  
  
  
Discharge plan 1. Possibly discharge home with hospice.

## 2019-04-17 NOTE — PROGRESS NOTES
[facility] 
[address] [place] [address], [address] [place] [address], [address] [place] [address] 
[phone] Medical Progress Note NAME: Sanchez Troy :  1970 MRM:  [id number] Date/Time: 2019  1:32 PM 
 
  
Assessment and Plan: 1. Dyspnea: Due to large pleural effusion, s/p 2.1L thoracentesis on admission however fluid reaccumulating. Exudative and likely malignant per pulm. Follow up cytology. S/p PleurX catheter. Monitor closely. 
  
2.  Rectal pain / ?anorectal carcinoma: biopsy taken during colonoscopy showed benign colonic mucosa. Reviewed GI and gen surgical note and no plan to repeat Bx, but biopsy of any peritoneal findings during diverting colostomy procedure. . 
 
3.  Esophageal cancer: stricture noted on EGD. Outpatient follow up, s/p XRT. CT showed mild ascites. There is suggestion of peritoneal carcinomatosis in the left upper abdomen. Poor prognosis 
  
4.  Lung CA: s/p chemo. Outpatient follow up. Now with malignant effusion.  
  
5. CKD. Renal US showed no evidence of obstruction. Cr stable, monitor BMP 
  
6. PEG tube: not currently in use. If pt unable to maintain adequate PO intake could consider resuming tube feeds Subjective: Chief Complaint:  Follow up of pt who was admitted with SOB. Denies SOB. ROS: 
(bold if positive, if negative) Tolerating PT  Tolerating Diet Objective:  
 
Last 24hrs VS reviewed since prior progress note. Most recent are: 
 
Visit Vitals /78 (BP 1 Location: Left arm, BP Patient Position: At rest) Pulse (!) 105 Temp 97.8 °F (36.6 °C) Resp 18 Ht 5' 9\" (1.753 m) Wt 65.3 kg (144 lb) SpO2 92% BMI 21.27 kg/m² SpO2 Readings from Last 6 Encounters:  
19 92% 14 99% 14 97% 14 97% 14 99% O2 Flow Rate (L/min): 2 l/min No intake or output data in the 24 hours ending 19 0830 Physical Exam: 
 
Gen:  Well-developed, well-nourished, in no acute distress HEENT:  Pink conjunctivae, PERRL, hearing intact to voice, moist mucous membranes Neck:  Supple, without masses, thyroid non-tender Resp:  No accessory muscle use, decreased air entry more on the RT Card:  No murmurs, normal S1, S2 without thrills, bruits or peripheral edema Abd:  Soft, non-tender, non-distended, normoactive bowel sounds are present, no palpable organomegaly and no detectable hernias Lymph:  No cervical or inguinal adenopathy Musc:  No cyanosis or clubbing Skin:  No rashes or ulcers, skin turgor is good Neuro:  Cranial nerves are grossly intact, no focal motor weakness, follows commands appropriately Psych:  Good insight, oriented to person, place and time, alert 
__________________________________________________________________ Medications Reviewed: (see below) Medications:  
 
Current Facility-Administered Medications Medication Dose Route Frequency  levalbuterol (XOPENEX) nebulizer soln 1.25 mg/3 mL  1.25 mg Nebulization Q6H RT  
 dextrose 5% - 0.45% NaCl with KCl 20 mEq/L infusion  75 mL/hr IntraVENous CONTINUOUS  
 sodium chloride (NS) flush 5-40 mL  5-40 mL IntraVENous Q8H  
 sodium chloride (NS) flush 5-40 mL  5-40 mL IntraVENous PRN  
 acetaminophen (TYLENOL) tablet 650 mg  650 mg Oral Q4H PRN  
 morphine injection 2 mg  2 mg IntraVENous Q4H PRN  
 ondansetron (ZOFRAN) injection 4 mg  4 mg IntraVENous Q4H PRN  
 oxyCODONE (ROXICODONE INTENSOL) 20 mg/mL concentrated solution 5 mg  5 mg Oral Q4H PRN  polyethylene glycol (MIRALAX) packet 17 g  17 g Oral DAILY  senna (SENOKOT) tablet 8.6 mg  1 Tab Oral DAILY  sertraline (ZOLOFT) tablet 50 mg  50 mg Oral DAILY Lab Data Reviewed: (see below) Lab Review:  
 
Recent Labs 04/17/19 
6988 04/16/19 
7746 04/15/19 
4348 WBC 8.8 7.6 7.9 HGB 11.6* 11.1* 11.6* HCT 35.8* 33.5* 34.8*  
 182 189 Recent Labs 04/17/19 
3916 04/16/19 
9123 04/15/19 
9879  138 139  
K 4.7 4.4 4.1 * 112* 111* CO2 22 20* 20* * 138* 141* BUN 26* 26* 30* CREA 1.97* 1.84* 1.97* CA 8.8 8.9 8.7 MG  --  1.7 1.8 PHOS  --  2.6 2.5* ALB  --  2.3* 2.5* Lab Results Component Value Date/Time Glucose (POC) 130 (H) 04/12/2019 07:41 AM  
 Glucose (POC) 251 (H) 01/18/2014 02:22 PM  
 
No results for input(s): PH, PCO2, PO2, HCO3, FIO2 in the last 72 hours. No results for input(s): INR in the last 72 hours. No lab exists for component: INREXT, INREXT All Micro Results Procedure Component Value Units Date/Time CULTURE, BODY FLUID [[id number]] Collected:  04/12/19 1227 Order Status:  Completed Specimen:  Pleural Fluid Updated:  04/16/19 5533 Special Requests: NO SPECIAL REQUESTS     
  GRAM STAIN 3+ WBCS SEEN     
   NO ORGANISMS SEEN Culture result: NO GROWTH 4 DAYS I have reviewed notes of prior 24hr. Other pertinent lab: Total time spent with patient: 28 Care Plan discussed with: Patient, Nursing Staff and >50% of time spent in counseling and coordination of care Discussed:  Care Plan Prophylaxis:  SCD's Disposition:  Home w/Family 
        
___________________________________________________ Attending Physician: Shanel Kern MD

## 2019-04-17 NOTE — PROGRESS NOTES
GI note Clemetine Kehr. Abner Miller, [place] 
[phone] office 
[phone] [facility] Gastroenterology Progress Note April 17, 2019 Admit Date: 4/12/2019 Narrative Assessment and Plan · Diffuse carcinomatosis Operative findings noted and seem unequivocal for advanced carcinomatous disease. Now status post diverting proximal colostomy. I have no plans for further endoscopic intervention. I would leave PEG in place in case venting needed (if obstruction/vomiting) or enteral nutrition/hydration if desired. I'd support a move to hospice care. Call if I can assist further. Signing off. Subjective: · Just got back from OR, in a little pain, denies vomiting or dyspnea. ROS:  The previous review of systems on initial consultation / H&P is noted and reviewed. Specific changes noted above in HPI. Current Medications:  
 
Current Facility-Administered Medications Medication Dose Route Frequency  lidocaine (PF) (XYLOCAINE) 10 mg/mL (1 %) injection 0.1 mL  0.1 mL SubCUTAneous PRN  
 lactated Ringers infusion  125 mL/hr IntraVENous CONTINUOUS  
 HYDROmorphone (DILAUDID) syringe 0.5 mg  0.5 mg IntraVENous Multiple  albuterol (PROVENTIL VENTOLIN) nebulizer solution 2.5 mg  2.5 mg Inhalation PRN  
 ondansetron (ZOFRAN) injection 4 mg  4 mg IntraVENous PRN  
 diphenhydrAMINE (BENADRYL) injection 12.5 mg  12.5 mg IntraVENous PRN  
 meperidine (DEMEROL) injection 12.5 mg  12.5 mg IntraVENous PRN  
 morphine injection 2 mg  2 mg IntraVENous Q2H PRN  
 levalbuterol (XOPENEX) nebulizer soln 1.25 mg/3 mL  1.25 mg Nebulization Q6H RT  
 dextrose 5% - 0.45% NaCl with KCl 20 mEq/L infusion  75 mL/hr IntraVENous CONTINUOUS  
 sodium chloride (NS) flush 5-40 mL  5-40 mL IntraVENous Q8H  
 sodium chloride (NS) flush 5-40 mL  5-40 mL IntraVENous PRN  
 acetaminophen (TYLENOL) tablet 650 mg  650 mg Oral Q4H PRN  
  ondansetron (ZOFRAN) injection 4 mg  4 mg IntraVENous Q4H PRN  
 oxyCODONE (ROXICODONE INTENSOL) 20 mg/mL concentrated solution 5 mg  5 mg Oral Q4H PRN  polyethylene glycol (MIRALAX) packet 17 g  17 g Oral DAILY  senna (SENOKOT) tablet 8.6 mg  1 Tab Oral DAILY  sertraline (ZOLOFT) tablet 50 mg  50 mg Oral DAILY Objective: VITALS:  
Last 24hrs VS reviewed since prior progress note. Most recent are: 
Visit Vitals /71 Pulse (!) 103 Temp 98 °F (36.7 °C) Resp 15 Ht 5' 9\" (1.753 m) Wt 65.3 kg (144 lb) SpO2 94% BMI 21.27 kg/m² Temp (24hrs), Av.1 °F (36.7 °C), Min:97.8 °F (36.6 °C), Max:98.5 °F (36.9 °C) Intake/Output Summary (Last 24 hours) at 2019 1324 Last data filed at 2019 1300 Gross per 24 hour Intake 1850 ml Output 500 ml Net 1350 ml EXAM: 
General:  Comfortable, no distress, somewhat sedate. Diverting ostomy in RLQ, some heme in bag as expected. Lab Data Reviewed:  
Recent Labs 19 
8546 19 
4222 04/15/19 
5210 WBC 8.8 7.6 7.9 HGB 11.6* 11.1* 11.6* HCT 35.8* 33.5* 34.8*  
 182 189 Recent Labs 19 
1780 19 
7477 04/15/19 
9755  138 139  
K 4.7 4.4 4.1 * 112* 111* CO2 22 20* 20* * 138* 141* BUN 26* 26* 30* CREA 1.97* 1.84* 1.97* CA 8.8 8.9 8.7 MG  --  1.7 1.8 PHOS  --  2.6 2.5* ALB  --  2.3* 2.5* Lab Results Component Value Date/Time Glucose (POC) 133 (H) 2019 12:25 PM  
 Glucose (POC) 130 (H) 2019 07:41 AM  
 Glucose (POC) 251 (H) 2014 02:22 PM  
 
No results for input(s): PH, PCO2, PO2, HCO3, FIO2 in the last 72 hours. No results for input(s): INR in the last 72 hours. No lab exists for component: INREXT Assessment: (See above) Active Problems: Dyspnea (2019) Plan: (See above) Signed By: Eneida Menjivar MD   
 2019  1:24 PM

## 2019-04-17 NOTE — PROGRESS NOTES
SURGERY PROGRESS NOTE Admit Date: 2019 POD Day of Surgery Procedure: Procedure(s): LAPAROSCOPIC POSSIBLE OPEN  DIVERTING COLOSTOMY Subjective: Anxious this am about surgery Objective:  
 
Visit Vitals /74 (BP 1 Location: Left arm, BP Patient Position: At rest) Pulse (!) 106 Temp 98.2 °F (36.8 °C) Resp 18 Ht 5' 9\" (1.753 m) Wt 65.3 kg (144 lb) SpO2 92% BMI 21.27 kg/m² Temp (24hrs), Av °F (36.7 °C), Min:97.4 °F (36.3 °C), Max:98.5 °F (36.9 °C) Physical Exam:   
Gen: NAD Resp: Aerating well CV: tachycardic Abdomen:  Soft. Non-tender, non-distended. Lab Results Component Value Date/Time WBC 8.8 2019 03:42 AM  
 HGB 11.6 (L) 2019 03:42 AM  
 Hemoglobin (POC) 17.0 2014 02:22 PM  
 HCT 35.8 (L) 2019 03:42 AM  
 Hematocrit (POC) 50 2014 02:22 PM  
 PLATELET 977  03:42 AM  
 MCV 90.2 2019 03:42 AM  
 
Lab Results Component Value Date/Time GFR est non-AA 36 (L) 2019 03:42 AM  
 GFRNA, POC 40 (L) 2018 04:15 PM  
 GFR est AA 44 (L) 2019 03:42 AM  
 GFRAA, POC 49 (L) 2018 04:15 PM  
 Creatinine 1.97 (H) 2019 03:42 AM  
 Creatinine (POC) 1.8 (H) 2018 04:15 PM  
 BUN 26 (H) 2019 03:42 AM  
 BUN (POC) 25 (H) 2014 02:22 PM  
 Sodium 140 2019 03:42 AM  
 Sodium (POC) 137 2014 02:22 PM  
 Potassium 4.7 2019 03:42 AM  
 Potassium (POC) 3.8 2014 02:22 PM  
 Chloride 112 (H) 2019 03:42 AM  
 Chloride (POC) 105 2014 02:22 PM  
 CO2 22 2019 03:42 AM  
 Magnesium 1.7 2019 03:43 AM  
 Phosphorus 2.6 2019 03:43 AM  
 
 
Assessment:  
 
Active Problems: Dyspnea (2019) Plan/Recommendations/Medical Decision Making:  
 
OR today for diverting loop colostomy for palliation

## 2019-04-17 NOTE — PROGRESS NOTES
Bedside and Verbal shift change report given to Tomas Zabala RN (oncoming nurse) by Braden Stahl RN (offgoing nurse). Report included the following information SBAR, Kardex, Intake/Output, MAR, Recent Results and Med Rec Status.

## 2019-04-17 NOTE — PROGRESS NOTES
Assessed ileostomy site prior to report and noticed active bleeding at site. Notified charge nurse, Mitesh North RN who paged Segun Paz RN. Dressing was removed and large 3 in bulge on left side of ostomy, bulge underneath the site as well, site actively bleeding. Wally Elkins paging surgeon for further instructions. 0 - Dr. Tyron Bellamy returned call, not concerned about bulge was present prior to returning to floor. Wally Elkins will redress per order. Will continue to monitor for bleeding.

## 2019-04-17 NOTE — PERIOP NOTES
TRANSFER - OUT REPORT: 
 
Telephone report given to Rajendra Gallardo RN(name) on Elmira Phillips  being transferred to [facility](unit) for routine post - op Report consisted of patients Situation, Background, Assessment and  
Recommendations(SBAR). Information from the following report(s) SBAR, OR Summary, Procedure Summary, Intake/Output and Recent Results was reviewed with the receiving nurse. Opportunity for questions and clarification was provided. Patient transported with: 
 O2 @ 2 liters Registered Nurse

## 2019-04-17 NOTE — PROGRESS NOTES
Bedside and Verbal shift change report given to Esther Mosqueda RN (oncoming nurse) by Elizabeth Tejeda RN (offgoing nurse). Report included the following information SBAR, Kardex, Procedure Summary, Intake/Output, MAR and Recent Results.

## 2019-04-17 NOTE — WOUND CARE
Ostomy nurse follow up Staff nurse in room, assessed RLQ ileostomy - active sanguinous drainage from under the wafer with large thick dark clot material over the stoma. Mid line incision approximated with dermabond- area medial to stoma to lateral part on incision raised, bulging, soft, tender to touch. Stoma is dark red and moist with thick dark mucus, red socorro in place, active bleeding under the lateral socorro at 9:00. Small amount of sanguinous drainage in appliance and leaking under the wafer Dr Roxane Proctor called to advise of assessment, verbal orders to obtain surgicel from the OR and tuck under the stoma. Area cleansed, 2 pieces of surgicel strips tucked under the stoma from 6:00 to 12:00. Slight pressure applied, drainage subsided after 10 minutes - 2 piece 2 3/4 appliance replaced with paste. Tolerated well. Supplies and extra surgicel at bedside - staff nurse aware of care given and will assess at 15-20 minute intervals and will call MD if any changes or increase in bleeding. Staff in room, patient remains stable Dr Horacio Henry office staff Rommel Pwoell called to update MD on care given. Will follow up 4/18.  
Renard Sprague

## 2019-04-17 NOTE — PROGRESS NOTES
Name: Herminia Holley Dr: [id number]  
: 1970 Admit Date: 2019 Phone:   Room: 535/01 PCP: Yue Patel MD  MRN: [id number] Date: 2019  Code: Full Code History of Present Illness: 
Mr. Ivette Hampton is a 43 yo gentleman with a history esophageal cancer, lung cancer, LIZETH (does not wear CPAP), COPD, and new suspected anal/rectal cancer. Found to have a large right sided pleural effusion. Underwent colonoscopy and EGD today. Had his PEG replaced and found to have significant anal/rectal stenosis, suspicious for malignancy. He had a chest x-ray for shortness of breath and found to have a right sided effusion. He tells me has been short of breath for quite some time and attributed it to his COPD. No cough, chest pain, f/c. 
 
Labs reviewed: no new labs today other than a glucose of 130 I have [facility] Pt with mod R effusion at that time and mass like lesion in RLL. PET scan in Feb with increased uptake in RLL w mod effusion. New R paracolic soft tissue mass w increased uptake Interval Hx: 
Pt alert. Breathing slightly improved today. S/p pleurx and removal of 1000 cc of fluid yesterday. For diverting ostomy today. Past Medical History:  
Diagnosis Date  Cancer [facility])   
 esophagus cancer, right lung cancer  Chronic obstructive pulmonary disease ([facility] [address]  Diabetes ([facility] [address]  Hypertension  Sleep apnea   
 no cpap Past Surgical History:  
Procedure Laterality Date  COLONOSCOPY N/A 2019 COLONOSCOPY performed by Arnold Lehman MD at [address] HX ENDOSCOPY    
 w/ PEG tube  HX HEENT    
 14years old had eye surgery  IR INSERT CATH PLEURAL INDWELL  2019 Family History Problem Relation Age of Onset  Heart Disease Mother  Hypertension Mother  Heart Disease Brother  Heart Disease Maternal Grandmother  Heart Disease Paternal Grandmother Social History Tobacco Use  Smoking status: Former Smoker Last attempt to quit: 2018 Years since quittin.1  Smokeless tobacco: Never Used Substance Use Topics  Alcohol use: No  
 
 
Allergies Allergen Reactions  Codeine Other (comments)  
  hallucinations  Pcn [Penicillins] Hives Current Facility-Administered Medications Medication Dose Route Frequency  levalbuterol (XOPENEX) nebulizer soln 1.25 mg/3 mL  1.25 mg Nebulization Q6H RT  
 dextrose 5% - 0.45% NaCl with KCl 20 mEq/L infusion  75 mL/hr IntraVENous CONTINUOUS  
 sodium chloride (NS) flush 5-40 mL  5-40 mL IntraVENous Q8H  
 sodium chloride (NS) flush 5-40 mL  5-40 mL IntraVENous PRN  
 acetaminophen (TYLENOL) tablet 650 mg  650 mg Oral Q4H PRN  
 morphine injection 2 mg  2 mg IntraVENous Q4H PRN  
 ondansetron (ZOFRAN) injection 4 mg  4 mg IntraVENous Q4H PRN  
 oxyCODONE (ROXICODONE INTENSOL) 20 mg/mL concentrated solution 5 mg  5 mg Oral Q4H PRN  polyethylene glycol (MIRALAX) packet 17 g  17 g Oral DAILY  senna (SENOKOT) tablet 8.6 mg  1 Tab Oral DAILY  sertraline (ZOLOFT) tablet 50 mg  50 mg Oral DAILY REVIEW OF SYSTEMS  
12 point ROS negative except as stated in the HPI. Physical Exam:  
Visit Vitals /78 (BP 1 Location: Left arm, BP Patient Position: At rest) Pulse (!) 105 Temp 97.8 °F (36.6 °C) Resp 18 Ht 5' 9\" (1.753 m) Wt 65.3 kg (144 lb) SpO2 92% BMI 21.27 kg/m² General:  Alert, cooperative, no distress, appears stated age. Head:  Normocephalic, without obvious abnormality, atraumatic. Eyes:  Conjunctivae/corneas clear. Nose: Nares normal. Septum midline. Mucosa normal.   
Throat: Lips, mucosa, and tongue normal.   
Neck: Supple, symmetrical, trachea midline, no adenopathy. Lungs:   R ant chest with air entry. R post chest w absent bs over the lower 2/3. L with basilar crackles. Chest wall:  No tenderness or deformity. Heart:  Regular rate and rhythm, S1, S2 normal, no murmur, click, rub or gallop. Abdomen:   Soft, non-tender. PEG in place Extremities: Extremities normal, atraumatic, no cyanosis or edema. Pulses: 2+ and symmetric all extremities. Skin: Skin color, texture, turgor normal. No rashes or lesions Lymph nodes: Cervical, supraclavicular nodes normal.  
Neurologic: Grossly nonfocal  
 
 
Lab Results Component Value Date/Time Sodium 140 04/17/2019 03:42 AM  
 Potassium 4.7 04/17/2019 03:42 AM  
 Chloride 112 (H) 04/17/2019 03:42 AM  
 CO2 22 04/17/2019 03:42 AM  
 BUN 26 (H) 04/17/2019 03:42 AM  
 Creatinine 1.97 (H) 04/17/2019 03:42 AM  
 Glucose 177 (H) 04/17/2019 03:42 AM  
 Calcium 8.8 04/17/2019 03:42 AM  
 Magnesium 1.7 04/16/2019 03:43 AM  
 Phosphorus 2.6 04/16/2019 03:43 AM  
 
 
Lab Results Component Value Date/Time WBC 8.8 04/17/2019 03:42 AM  
 HGB 11.6 (L) 04/17/2019 03:42 AM  
 PLATELET 640 82/87/5174 03:42 AM  
 MCV 90.2 04/17/2019 03:42 AM  
 
 
Lab Results Component Value Date/Time AST (SGOT) 13 (L) 04/14/2019 02:06 AM  
 Alk. phosphatase 69 04/14/2019 02:06 AM  
 Protein, total 5.6 (L) 04/14/2019 02:06 AM  
 Albumin 2.3 (L) 04/16/2019 03:43 AM  
 Globulin 3.1 04/14/2019 02:06 AM  
 
 
No results found for: IRON, FE, TIBC, IBCT, PSAT, FERR No results found for: SR, CRP, TALA, ANAIGG, RA, RPR, RPRT, VDRLT, VDRLS, TSH, TSHEXT, TSHEXT No results found for: PH, PHI, PCO2, PCO2I, PO2, PO2I, HCO3, HCO3I, FIO2, FIO2I No results found for: CPK, RCK1, RCK2, RCK3, RCK4, CKNDX, CKND1, TROPT, TROIQ, BNPP, BNP Lab Results Component Value Date/Time Culture result: NO GROWTH 4 DAYS 04/12/2019 12:27 PM  
 
 
No results found for: TOXA1, RPR, HBCM, HBSAG, HAAB, HCAB1, HAAT, G6PD, CRYAC, HIVGT, HIVR, HIV1, HIV12, HIVPC, HIVRPI No results found for: VANCT, CPK No results found for: COLOR, APPRN, SPGRU, HILDA, PROTU, GLUCU, KETU, BILU, Eris Canela, TRISH, LEUKU, WBCU, RBCU, UEPI, BACTU, CASTS, UCRY IMPRESSION 
· Pleural effusion: likely malignant effusion. Chemistries on fluid c/w exudative effusion. Cytology pending. S/p Pleurx drain 4/16 · Esophageal cancer · Lung cancer · Suspected jos/rectal cancer · COPD · Sleep apnea (does not wear CPAP) PLAN 
· Goal sats >90% · Awaiting pleural fluid cytology · Drain pleural fluid daily and monitor volume - starting Thurs · Cont with xopenex nebs · For diverting ostomy today Ponce Weiss MD

## 2019-04-17 NOTE — BRIEF OP NOTE
BRIEF OPERATIVE NOTE Date of Procedure: 4/17/2019 Preoperative Diagnosis: OBSTRUCTING COLON MASS Postoperative Diagnosis: OBSTRUCTING COLON MASS Procedure(s): LAPAROSCOPIC turned OPEN  DIVERTING Ileostomy Surgeon(s) and Role: Nemo Manrique MD - Primary Surgical Assistant: Flora Crawley Surgical Staff: 
Circ-1: Katey Dobbs RN 
Circ-Relief: Federico Zeng RN Scrub Tech-1: Robert Hart Scrub Tech-Relief: Celine Mar Surg Asst-1: Sloane Lux Surg Asst-2: May, Marita Event Time In Time Out Incision Start 04/17/2019 1157 Incision Close Anesthesia: General  
Estimated Blood Loss: 5 ml Specimens:  
ID Type Source Tests Collected by Time Destination 1 : omental implant Preservative Omentum  Nemo Manrique MD 4/17/2019 1116 Pathology 1 : peritoneal fluid Fresh Peritoneal Fluid  Nemo Manrique MD 4/17/2019 1108 Cytology Findings: diffuse carcinomatosis with bulky partially obstructing disease at hepatic flexure Complications: none Implants: * No implants in log *

## 2019-04-17 NOTE — ANESTHESIA PREPROCEDURE EVALUATION
Relevant Problems No relevant active problems Anesthetic History Review of Systems / Medical History Patient summary reviewed, nursing notes reviewed and pertinent labs reviewed Pulmonary COPD: moderate Shortness of breath Pertinent negatives: No sleep apnea Comments: Recent thoracocentesis for Right pleural effusion. Neuro/Psych Cardiovascular Exercise tolerance: >4 METS 
  
GI/Hepatic/Renal 
  
 
 
 
 
 
Comments: Esophageal cancer diagnosed 1+ years ago Metastatic colo-rectal cancer. Has PEG in place and portocath right chest. 
cachexia Significant weight loss. Endo/Other Diabetes: type 2 Other Findings Physical Exam 
 
Airway Mallampati: II 
 
Neck ROM: normal range of motion Mouth opening: Normal 
 
 Cardiovascular Rhythm: regular Rate: normal 
 
 
 
 Dental 
 
Dentition: Edentulous, Full upper dentures and Full lower dentures Pulmonary Decreased breath sounds: bilateral 
 
 
 
 
 Abdominal 
GI exam deferred Other Findings Anesthetic Plan ASA: 4 Anesthesia type: general 
 
 
 
 
Induction: Intravenous Anesthetic plan and risks discussed with: Patient and Family Informed consent obtained.

## 2019-04-17 NOTE — PROGRESS NOTES
17:45 re-assessment of ileostomy. Dark brown/dark red drainage noted, about 20cc. Looks like old blood drainage. no signs of active bleeding at this time. Patient complaining about pain at the site, morphine administered per PRN order.

## 2019-04-17 NOTE — ANESTHESIA POSTPROCEDURE EVALUATION
Procedure(s): LAPAROSCOPIC CONVERTED TO OPEN  DIVERTING ILEOSTOMY. general 
 
Anesthesia Post Evaluation Multimodal analgesia: multimodal analgesia not used between 6 hours prior to anesthesia start to PACU discharge Patient location during evaluation: PACU Patient participation: complete - patient participated Level of consciousness: sleepy but conscious Pain score: 0 Pain management: adequate Airway patency: patent Anesthetic complications: no 
Cardiovascular status: hemodynamically stable and acceptable Respiratory status: acceptable Hydration status: acceptable Comments: Patient seen and evaluated; no concerns. Post anesthesia nausea and vomiting:  none Vitals Value Taken Time /70 4/17/2019 12:55 PM  
Temp 36.7 °C (98 °F) 4/17/2019 12:17 PM  
Pulse 103 4/17/2019 12:57 PM  
Resp 15 4/17/2019 12:57 PM  
SpO2 94 % 4/17/2019 12:57 PM  
Vitals shown include unvalidated device data.

## 2019-04-17 NOTE — PROGRESS NOTES
Nutrition Assessment: 
 
RECOMMENDATIONS/INTERVENTION(S):  
If PEG tube feeding needed: 
Jevity 1.5 @ 55 goal rate w  mL Q4H Advance to GI lite/CCD to control BG for wound healing. Monitor PO intakes, weight, GI status- BMs are mucous per RN. Add ONS Ensure Clear when diet advanced- monitor BG 
 
ASSESSMENT:  
4/17: Pt in OR to have diverting ostomy placed. Recommend GI lite after diet advanced if pt can tolerate PO sufficiently. If pt cannot tolerate enough PO to maintain weight, use PEG for TF. Type and volume recs above. Recommend starting PO or TF as soon as possible to include protein for wound healing after open abdominal surgery. Labs reviewed. BG elevated- 177 mg/d this morning, likely stress/anxiety related to surgery today. IVF D5 1/2 NS @ 75 mL/hr - provides 306 kcal of dextrose. 4/15: 44 yr old male admitted after attempted EGD and colonoscopy. PMHx: esophageal cancer as well as lung cancer, COPD, PEG, DM, HTN. MST for weight loss and PEG. Pt states he hasn't used the PEG in a \"long time\" and eats PO normally. Pt states appetite is fair and is kind of hungry at the moment 2/2 tray being taken away before he got to finish it. Recommended ONS, pt declined at this time. Doesn't drink any protein drinks at home. Pt claims 20 lbs (12%) weight loss over last 2 months. Significant for time frame. No weight hx in chart to verify. Pt denies n/v currently. States was nauseated earlier. Pt discussed in IDR rounds- pt may need plurex and maybe diversion of GI tract 2/2 mass found. Awaiting path from colonoscopy and POC. Pt currently on GI lite diet. Encourage PO. , 141, 116 mg/dL. A1C 6.5. Phos 2.5. Consult if TF is needed or diet ed for ostomy if placed. SUBJECTIVE/OBJECTIVE:  
Diet Order: (GI lite) % Eaten:  No data found. Pertinent Medications: [x] Reviewed Labs reviewed:  [x] Anthropometrics: Height: 5' 9\" (175.3 cm) Weight: 65.3 kg (144 lb) IBW (%IBW):   ( ) UBW (%UBW):   (  %) BMI: Body mass index is 21.27 kg/m². This BMI is indicative of: 
 
 [] Underweight    [x] Normal    [] Overweight    []  Obesity    []  Extreme Obesity (BMI>40) Estimated Nutrition Needs (Based on): 1948 Kcals/day(BMR(1499x1.3)) , 65 g(-78g/day(1.0-1.2g/kg)) Protein Carbohydrate: At Least 130 g/day  Fluids: 1950 mL/day (1mL/kg rounded to 50 mL) Last BM: 4/16- mucous   [x]Active     []Hyperactive  []Hypoactive       [] Absent   BS Skin:    [] Intact   [x] Incision- abdomen  [] Breakdown   [] DTI   [] Tears/Excoriation/Abrasion  []Edema [] Other: Wt Readings from Last 30 Encounters:  
04/12/19 65.3 kg (144 lb) 06/09/14 87.1 kg (192 lb) 04/09/14 87.7 kg (193 lb 6.4 oz) 03/06/14 87.8 kg (193 lb 9.6 oz) 02/10/14 86.5 kg (190 lb 9.6 oz) 01/30/14 87.1 kg (192 lb)  
01/18/14 83.9 kg (185 lb) NUTRITION DIAGNOSES:  
Problem:  Altered GI function Etiology: related to alteration in GI structure/fucntion Signs/Symptoms: as evidenced by PEG, esophageal cancer, possible rectal cancer- awaiting path NUTRITION INTERVENTIONS: 
Meals/Snacks: General/healthful diet Enteral/Parenteral Nutrition: Initiate enteral nutrition Supplements: Commercial supplement GOAL:  
Pt will consume >50% of meals and have BM within 3-5 days Cultural, Bahai, or Ethnic Dietary Needs: None LEARNING NEEDS (Diet, Food/Nutrient-Drug Interaction):  
 [x] None Identified 
 [] Identified and Education Provided/Documented 
 [] Identified and Pt declined/was not appropriate [x] Interdisciplinary Care Plan Reviewed/Documented  
 [x] Discharge Needs: TF vs PO - ostomy diet, high protein [] No Nutrition Related Discharge Needs NUTRITION RISK:  
Pt Is At Nutrition Risk  [x] No Nutrition Risk Identified  [] PT SEEN FOR:  
 []  MD Consult: []Calorie Count []Diabetic Diet Education []Diet Education []Electrolyte Management []General Nutrition Management and Supplements []Management of Tube Feeding []TPN Recommendations []  RN Referral:  []MST score >=2 
   []Enteral/Parenteral Nutrition PTA []Pregnant: Gestational DM or Multigestation  
              [] Pressure Ulcer 
   
[]  Low BMI      []  Length of Stay       [] Dysphagia Diet     [] Ventilator      [x] Follow-Up Previous Recommendations: 
 [] Implemented          [x] Not Implemented          [] Not Applicable Previous Goal: 
 [] Met              [x] Progressing Towards Goal              [] Not Progressing Towards Goal   [] Not Applicable Gaby Skaggs RD Pager: [phone] Office: [phone]

## 2019-04-18 NOTE — PROGRESS NOTES
0700 
Bedside and Verbal shift change report given to [facility] RN (oncoming nurse) by Ray Lemus RN  (offgoing nurse). Report included the following information SBAR, Kardex, Intake/Output, MAR, Recent Results and Med Rec Status. 2635 Pt hooked to suction. Immediate return of 200cc of dark green stomach contents. Pt now resting comfortably. Will continue to monitor. 0500 Pt began having multiple episodes of emesis. Dr. Didi Haynes called and stated to hook patient's PEG tube to low continuous wall suction. 6685 Pt states he is having reflux and indigestion. Dr. Eulalio Ramey stated to order 30 mL of maalox every 6 hours as needed for indigestion. Will continue to monitor. 0030 Pt needing morphine Q2. Asked patient if he needed more for achieving his pain goal. Pt stated he wanted to continue his current regimen. Call bell in reach. Pt educated to call if pain worsens. Will continue to monitor. 2141 Dr. Didi Haynes stated to check CBC with AM labs and continue to montior. 2100 Pt urinated dark red urine. Pt POD 0 of open diverting ileostomy. No other complaints at this time. Dr. Eulalio Ramey made aware and stated to call general surgery. Gen Surg paged. Will continue to monitor.

## 2019-04-18 NOTE — PROGRESS NOTES
7:45 Emptied canister for PEG tube on continuous suction. 700cc of dark, almost black, emesis in canister; new one attached. No patient complaints at this time. Will continue to monitor.

## 2019-04-18 NOTE — PROGRESS NOTES
Palliative Medicine Consult Patient Name: Rachel Smith YOB: 1970 Date of Initial Consult: 4/12/2019 Reason for Consult: Dr. Katey Murry Requesting Provider: Overwhelming symptoms Primary Care Physician: Graham Lai MD 
  
 SUMMARY:  
Rachel Smith is a 44 y.o. with a past history of lung cancer, esophageal cancer hypertension, COPD, status post PEG, who was admitted on 4/12/2019 from home with a diagnosis of newly diagnosed likely anal or rectal CA. Current medical issues leading to Palliative Medicine involvement include: Overwhelming symptoms. History of present illness/past medical history patient is a 40-year-old gentleman with a history of esophageal cancer as well as lung cancer. He is followed Dr. More Lopez from [facility]. He was supposed to see [facility] in approximately 2 weeks for follow-up on his esophageal cancer. He already has a PEG placed secondary to his esophageal cancer. He states he had been on some type of \"oral chemotherapy agent but this was causing significant side effects and so had recently been stopped. He is been having significant anal type pain and had seen Dr. Katey Murry as an outpatient. He presents today for attempts at a colonoscopy as well as repeat EGD. Unfortunately EGD showed what appears to be malignant narrowing at 37 to 40 cm. In addition, attempted a colonoscopy were not able to be completed secondary to significant needle stricture, sclerosis. Even a pediatric colonoscope could not pass the anal stenosis. Ultimately an upper GI scope was used to bypass into the rectal area which profuse stool was seen throughout. The anal canal is friable firm and was concerning for anal carcinoma. He was having significant pain and discomfort but that has seemed to improve by the time I have seen the patient. He is waiting Colorectal surgeon evaluation.   He admits that his bowel movements have been less over the last couple months and describes very small stools. His breathing has been worse over the last several weeks. He had used tramadol at home to help with his pain but that gave no relief. Social history he is  to Denis. His sister, Ricardo Murphy is also very involved and is in the room. PALLIATIVE DIAGNOSES:  
1. Cancer associated pain 2. Shortness of breathimproved after thoracentesis on the right with 2 L removed 3. 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 4. Esophageal cancer 5. Lung cancer 6. Advance care planning 7. DNR discussion PLAN:  
1. Vi(Hospice) and I met with patient, spouse, and sister. Reviewed with the patient the findings on CT scan and the concern that very limited options for treatment going forward. We also discussed the pathology report on pleural fluid. Patient definitely seems overwhelmed by the discussion and sister/spouse are very tearful. Ultimately, his sister left the room secondary to her emotions 2. Goals of care discussed hospice care and what mer could offer for him. He wants to think further but seems to support the transition when cleared by surgical team. Our team will f/u tomorrow. 3. Advance care planning he has completed an advanced medical directive. He has assigned his wife as primary medical power of  and his sister at secondary medical power of . I have asked his family to bring a copy to the hospital. Sister will bring 4. CODE STATUS addressed resuscitation with him again today and explained the limitations of CPR, etc in patient with advanced cancer. He wants to think further. 5. Symptom management he currently has IV morphine 2 mg IV every 2 hours and has used 10 doses over 12 hours. Will adjust the dose to 4 mg IV every 3 hours to see if last a little longer. 6. Psychosocialno spiritual concerns. He does have very good support from his spouse and sister. 7. Discussed with Dr. Hilary Reynolds, bedside nurse and Dr. Fariba Mcdonnell 8. Initial consult note routed to primary continuity provider 9. Communicated plan of care with: Palliative IDT 
 
 
 GOALS OF CARE / TREATMENT PREFERENCES:  
[====Goals of Care====] GOALS OF CARE: 
Patient/Health Care Proxy Stated Goals: Prolong life TREATMENT PREFERENCES:  
Code Status: Full Code Advance Care Planning: 
Advance Care Planning 4/13/2019 Confirm Advance Directive Yes, not on file Medical Interventions: Full interventions Other Instructions:  
Artificially Administered Nutrition: Feeding tube long-term, if indicated The palliative care team has discussed with patient / health care proxy about goals of care / treatment preferences for patient. 
[====Goals of Care====] HISTORY:  
 
History obtained from: Chart, spouse, sister CHIEF COMPLAINT: Rectal pain HPI/SUBJECTIVE: The patient is:  
[x] Verbal and participatory [] Non-participatory due to:  
Patient is feeling much better. He has had a thoracentesis that removed 2 L of fluid. Feels like his breathing is much improved. 4/17 patient is just returned from the operating room and is still a little bit lethargic. 4/18-awake and alert. Still pain in the abdomen Clinical Pain Assessment (nonverbal scale for severity on nonverbal patients):  
Clinical Pain Assessment Severity: 3 Location: Anal area Character: Throbbing and aching Duration: Months Effect: Difficult to have a bowel movement Factors: Constipation Frequency: Intermittent Adult Nonverbal Pain Scale Face: No particular expression or smile Activity (Movement): Lying quietly, normal position Guarding: Lying quietly, no positioning of hands over areas of body Physiology (Vital Signs): Stable vital signs Respiratory: Baseline RR/SpO2 compliant with ventilator Total Score: 0 
 
 
 FUNCTIONAL ASSESSMENT:  
 
Palliative Performance Scale (PPS): PPS: 70 PSYCHOSOCIAL/SPIRITUAL SCREENING:  
 
Advance Care Planning: 
Advance Care Planning 4/13/2019 Confirm Advance Directive Yes, not on file Any spiritual / Adventism concerns: 
[] Yes /  [x] No 
 
Caregiver Burnout: 
[] Yes /  [x] No /  [] No Caregiver Present Anticipatory grief assessment:  
[x] Normal  / [] Maladaptive ESAS Anxiety: Anxiety: 0 
 
ESAS Depression: Depression: 0 REVIEW OF SYSTEMS:  
 
Positive and pertinent negative findings in ROS are noted above in HPI. The following systems were [x] reviewed / [] unable to be reviewed as noted in HPI Other findings are noted below. Systems: constitutional, ears/nose/mouth/throat, respiratory, gastrointestinal, genitourinary, musculoskeletal, integumentary, neurologic, psychiatric, endocrine. Positive findings noted below. Modified ESAS Completed by: provider Fatigue: 2 Drowsiness: 0 Depression: 0 Pain: 3 Anxiety: 0 Nausea: 0 Anorexia: 3 Dyspnea: 4 Constipation: Yes Stool Occurrence(s): 1 PHYSICAL EXAM:  
 
From RN flowsheet: 
Wt Readings from Last 3 Encounters:  
04/12/19 144 lb (65.3 kg) 06/09/14 192 lb (87.1 kg) 04/09/14 193 lb 6.4 oz (87.7 kg) Blood pressure 102/72, pulse (!) 115, temperature 98.3 °F (36.8 °C), resp. rate 16, height 5' 9\" (1.753 m), weight 144 lb (65.3 kg), SpO2 92 %. Pain Scale 1: Numeric (0 - 10) Pain Intensity 1: 9 Pain Onset 1: post op Pain Location 1: Abdomen Pain Orientation 1: Left Pain Description 1: Sore Pain Intervention(s) 1: Medication (see MAR) Last bowel movement, if known:  
 
Constitutional: Thin, alert and oriented, sleepy Eyes: pupils equal, anicteric ENMT: no nasal discharge, moist mucous membranes Cardiovascular: regular rhythm, distal pulses intact Respiratory: breathing not labored, decreased on the right Gastrointestinal: soft, PEG in place, ileostomy noted, diffuse TTP Musculoskeletal: no deformity, no tenderness to palpation Skin: warm, dry Neurologic: following commands, moving all extremities Psychiatric: full affect, no hallucinations Other: 
 
 
 HISTORY:  
 
Active Problems: Dyspnea (2019) Past Medical History:  
Diagnosis Date  Cancer [facility])   
 esophagus cancer, right lung cancer  Chronic obstructive pulmonary disease ([facility] [address]  Diabetes ([facility] [address]  Hypertension  Sleep apnea   
 no cpap Past Surgical History:  
Procedure Laterality Date  COLONOSCOPY N/A 2019 COLONOSCOPY performed by Pooja De Jesus MD at [address] HX ENDOSCOPY    
 w/ PEG tube  HX HEENT    
 14years old had eye surgery  IR INSERT CATH PLEURAL INDWELL  2019 Family History Problem Relation Age of Onset  Heart Disease Mother  Hypertension Mother  Heart Disease Brother  Heart Disease Maternal Grandmother  Heart Disease Paternal Grandmother History reviewed, no pertinent family history. Social History Tobacco Use  Smoking status: Former Smoker Last attempt to quit: 2018 Years since quittin.1  Smokeless tobacco: Never Used Substance Use Topics  Alcohol use: No  
 
Allergies Allergen Reactions  Codeine Other (comments)  
  hallucinations  Pcn [Penicillins] Hives States \"it's a childhood allergy\" Current Facility-Administered Medications Medication Dose Route Frequency  aluminum-magnesium hydroxide (MAALOX) oral suspension 30 mL  30 mL Oral Q6H PRN  
 0.9% sodium chloride infusion  75 mL/hr IntraVENous CONTINUOUS  
 morphine injection 4 mg  4 mg IntraVENous Q3H PRN  
 lidocaine (PF) (XYLOCAINE) 10 mg/mL (1 %) injection 0.1 mL  0.1 mL SubCUTAneous PRN  
 levalbuterol (XOPENEX) nebulizer soln 1.25 mg/3 mL  1.25 mg Nebulization Q6H RT  
  sodium chloride (NS) flush 5-40 mL  5-40 mL IntraVENous Q8H  
 sodium chloride (NS) flush 5-40 mL  5-40 mL IntraVENous PRN  
 acetaminophen (TYLENOL) tablet 650 mg  650 mg Oral Q4H PRN  
 ondansetron (ZOFRAN) injection 4 mg  4 mg IntraVENous Q4H PRN  
 oxyCODONE (ROXICODONE INTENSOL) 20 mg/mL concentrated solution 5 mg  5 mg Oral Q4H PRN  
 sertraline (ZOLOFT) tablet 50 mg  50 mg Oral DAILY  
 
 
 
 LAB AND IMAGING FINDINGS:  
 
Lab Results Component Value Date/Time WBC 11.6 (H) 04/18/2019 05:02 AM  
 HGB 12.6 04/18/2019 05:02 AM  
 PLATELET 212 55/48/1334 05:02 AM  
 
Lab Results Component Value Date/Time Sodium 139 04/18/2019 05:02 AM  
 Potassium 5.4 (H) 04/18/2019 05:02 AM  
 Chloride 110 (H) 04/18/2019 05:02 AM  
 CO2 23 04/18/2019 05:02 AM  
 BUN 26 (H) 04/18/2019 05:02 AM  
 Creatinine 2.11 (H) 04/18/2019 05:02 AM  
 Calcium 9.9 04/18/2019 05:02 AM  
 Magnesium 1.7 04/16/2019 03:43 AM  
 Phosphorus 2.6 04/16/2019 03:43 AM  
  
Lab Results Component Value Date/Time AST (SGOT) 13 (L) 04/14/2019 02:06 AM  
 Alk. phosphatase 69 04/14/2019 02:06 AM  
 Protein, total 5.6 (L) 04/14/2019 02:06 AM  
 Albumin 2.3 (L) 04/16/2019 03:43 AM  
 Globulin 3.1 04/14/2019 02:06 AM  
 
No results found for: INR, PTMR, PTP, PT1, PT2, APTT No results found for: IRON, FE, TIBC, IBCT, PSAT, FERR No results found for: PH, PCO2, PO2 No components found for: [place] No results found for: CPK, CKMB Total time: 35 
Counseling / coordination time, spent as noted above: 30 
> 50% counseling / coordination?: yes Prolonged service was provided for  []30 min   []75 min in face to face time in the presence of the patient, spent as noted above. Time Start:  
Time End:  
Note: this can only be billed with 90101 (initial) or 82238 (follow up). If multiple start / stop times, list each separately.

## 2019-04-18 NOTE — OP NOTES
[facility] 
OPERATIVE REPORT Name:  Jen Arzola 
MR#:  [id number] :  1970 ACCOUNT #:  [de-identified] DATE OF SERVICE:  2019 CLINICAL SERVICE:  General Surgery PREOPERATIVE DIAGNOSIS:  Large bowel obstruction. POSTOPERATIVE DIAGNOSIS:  Large bowel obstruction. PROCEDURE PERFORMED:  Exploratory laparotomy, creation of diverting loop ileostomy. SURGEON:  Dez Redman MD 
 
ASSISTANT:  Vicente Oppenheim May ANESTHESIA:  General 
 
COMPLICATIONS:  None. SPECIMENS REMOVED:  Peritoneal fluid and omental implant. IMPLANTS:  None. ESTIMATED BLOOD LOSS:  5 mL DRAINS AND TUBES:  None. INDICATIONS:  The patient is a 47-year-old gentleman with known metastatic esophageal cancer. He presented to the hospital after an attempted colonoscopy for severe rectal pain and stenosis. Given the fact that he had been unable to pass any stool, the decision was made to bring him to the operating room for diversion. PROCEDURE:  After obtaining informed consent, the patient was brought to the operating room where he was laid supine on the operating room table. After induction of general endotracheal anesthesia, the patient was prepped and draped in standard fashion. Surgical time-out was conducted and 2 grams of IV Ancef administered as preoperative antibiotic. With this complete, 0.25% Marcaine was infiltrated just below the umbilicus. Incision was made with an 11 blade scalpel and a 5-mm trocar was placed using Optiview technique. With this trocar in place, it was obvious that there was significant amount of disease within his abdomen as well as a large volume of fluid. He had a large mass in the right upper quadrant as well which was previously unknown. Decision was then made to convert to an open procedure to have a better visualization and evaluation of the abdominal cavity.   A 10-blade scalpel was then used to make an incision along the midline. Dissection was carried using Bovie electrocautery down through the subcutaneous tissue down to the fascia. The fascia was sharply incised and the peritoneal cavity was entered. Approximately 1.5 liter of fluid was evacuated from the abdominal cavity. Some of the fluid was taken and sent for evaluation with pathology. Upon exploration of the abdomen, he was noted to have significant burden of disease throughout the abdominal cavity. He had a large partially obstructing right colon mass at the hepatic flexure. He had omental caking as well as implants along the anterior abdominal wall above the bladder. Given the large right colon mass, the decision was made to give him a diverting ileostomy. A small portion of the omental implant was resected and sent for pathology as well for further diagnosis. An area on his right abdominal wall was identified and a loop of small intestine was brought out through the abdominal wall at this location. His midline fascia was then closed using a running 1 loop PDS suture. The midline was then closed using interrupted 3-0 Vicryl sutures and a running 4-0 Monocryl to close the skin. Once the midline incision was closed, the ostomy was matured. A red rubber catheter was used as a bridge placed beneath the loop. An incision was then made in the bowel wall and the ostomy was matured using interrupted 3-0 Vicryl suture. The ostomy appliance was then applied. The patient tolerated the procedure well. There were no complications. All instrument, needle, and sponge counts were correct at the end of the case. The patient was transferred to the recovery room in stable condition. Ofleia Patrick MD [id number]/ 
D:  04/18/2019 8:42 T:  04/18/2019 10:25 
JOB #:  [id number]

## 2019-04-18 NOTE — PROGRESS NOTES
18:00 Patient's family member brought RN to change patient's gown due to wetness. When RN assessed patient it was found that his pleurX site was leaking serous fluid threw the occlusive dressing to the gown. RN paged on call pulmonary MD.  Awaiting call back. Patient relaxed and alert. 18:45 Dr. Braden Monroe returned call. Stated to drain 500cc out of pleurX drain and replace dressing with dry dressing. 4hrs later attempt to drain 500cc again. In the morning also attempt to drain 500cc. Watch site for drainage and notify pulmonary in the morning if breakthrough drainage continue to occur. 19:00 700cc of serosanguinous drained from pleurX tube. Tube re-clamp. Patient did not complain of discomfort or SOB. Dressing changed and replaced with split gauze, gauze, and a transparent dressing.

## 2019-04-18 NOTE — HOSPICE
[facility] Those in [facility] 
[phone] Patient Name: Shayy Maria YOB: 1970 Age: 44 y.o. [facility] RN Note:   
 
Met with patient, wife and daughter in room with Dr. Magda Hernandez to discuss palliative concerns and give information on hospice. The patient had just received information on the results of his surgeries and pathologies, and was overwhelmed and emotional. It looks as if the conversation will move toward hospice at home, but family wanted more time to discuss. Patient chooses to remain a FULL code at present so he can think about it longer. At present, patient is requiring multiple doses of pain medication and therefore an inpatient solution may have to be examined at first. We will follow this patient and provide services when needed. Thank you for the opportunity to be of service to this patient.

## 2019-04-18 NOTE — PROGRESS NOTES
Bedside and Verbal shift change report given to Vinay West RN (oncoming nurse) by Ean Garcia RN (offgoing nurse). Report included the following information SBAR, Kardex, Procedure Summary, Intake/Output, MAR and Recent Results.

## 2019-04-18 NOTE — PROGRESS NOTES
Name: Herminia Belgrade Dr: [facility]  
: 1970 Admit Date: 2019 Phone:   Room: 535/01 PCP: Odessa Blackwood MD  MRN: [id number] Date: 2019  Code: Full Code Chart and notes reviewed. Data reviewed. I review the patient's current medications in the medical record at each encounter. I have evaluated and examined the patient. History of Present Illness: 
Mr. Ynes Faith is a 45 yo gentleman with a history esophageal cancer, lung cancer, LIZETH (does not wear CPAP), COPD, and new suspected anal/rectal cancer. Found to have a large right sided pleural effusion. Underwent colonoscopy and EGD today. Had his PEG replaced and found to have significant anal/rectal stenosis, suspicious for malignancy. He had a chest x-ray for shortness of breath and found to have a right sided effusion. He tells me has been short of breath for quite some time and attributed it to his COPD. No cough, chest pain, f/c. 
 
Labs reviewed: no new labs today other than a glucose of 130 I have personally reviewed chest CT from [facility] in 2019. Pt with mod R effusion at that time and mass like lesion in RLL. PET scan in Feb with increased uptake in RLL w mod effusion. New R paracolic soft tissue mass w increased uptake Interval Hx: Afebrile HR 90s-100s Sats 94% on 2L 
K 5.4 Creat 2.11 Pleural fluid cytology positive for adenocarcinoma During diverting ileostomy, noted diffuse carcinomatosis with bulky partially obstructing disease at hepatic flexure ROS: Not SOB currently at rest and feels that draining pleural fluid helpful. Pain currently controlled. Had some vomiting yesterday, better currently. Has hiccups which are bothersome. Denies fever or chills. Past Medical History:  
Diagnosis Date  Cancer [facility])   
 esophagus cancer, right lung cancer  Chronic obstructive pulmonary disease ([facility] [address]  Diabetes ([facility] [address]  Hypertension  Sleep apnea   
 no cpap Past Surgical History:  
Procedure Laterality Date  COLONOSCOPY N/A 2019 COLONOSCOPY performed by Arnold Lehman MD at [address] [facility] HX ENDOSCOPY    
 w/ PEG tube  HX HEENT    
 14years old had eye surgery  IR INSERT CATH PLEURAL INDWELL  2019 Family History Problem Relation Age of Onset  Heart Disease Mother  Hypertension Mother  Heart Disease Brother  Heart Disease Maternal Grandmother  Heart Disease Paternal Grandmother Social History Tobacco Use  Smoking status: Former Smoker Last attempt to quit: 2018 Years since quittin.1  Smokeless tobacco: Never Used Substance Use Topics  Alcohol use: No  
 
 
Allergies Allergen Reactions  Codeine Other (comments)  
  hallucinations  Pcn [Penicillins] Hives States \"it's a childhood allergy\" Current Facility-Administered Medications Medication Dose Route Frequency  aluminum-magnesium hydroxide (MAALOX) oral suspension 30 mL  30 mL Oral Q6H PRN  
 0.9% sodium chloride infusion  75 mL/hr IntraVENous CONTINUOUS  
 lidocaine (PF) (XYLOCAINE) 10 mg/mL (1 %) injection 0.1 mL  0.1 mL SubCUTAneous PRN  
 morphine injection 2 mg  2 mg IntraVENous Q2H PRN  
 levalbuterol (XOPENEX) nebulizer soln 1.25 mg/3 mL  1.25 mg Nebulization Q6H RT  
 sodium chloride (NS) flush 5-40 mL  5-40 mL IntraVENous Q8H  
 sodium chloride (NS) flush 5-40 mL  5-40 mL IntraVENous PRN  
 acetaminophen (TYLENOL) tablet 650 mg  650 mg Oral Q4H PRN  
 ondansetron (ZOFRAN) injection 4 mg  4 mg IntraVENous Q4H PRN  
 oxyCODONE (ROXICODONE INTENSOL) 20 mg/mL concentrated solution 5 mg  5 mg Oral Q4H PRN  
 sertraline (ZOLOFT) tablet 50 mg  50 mg Oral DAILY REVIEW OF SYSTEMS  
12 point ROS negative except as stated in the HPI. Physical Exam:  
Visit Vitals /73 (BP 1 Location: Left arm, BP Patient Position: At rest) Pulse (!) 110 Temp 97.3 °F (36.3 °C) Resp 16 Ht 5' 9\" (1.753 m) Wt 65.3 kg (144 lb) SpO2 94% BMI 21.27 kg/m² General:  Alert, cooperative, ill appearing, no acute distress, appears stated age. Head:  Normocephalic, without obvious abnormality, atraumatic. Eyes:  Conjunctivae/corneas clear. Nose: Nares normal. Septum midline. Mucosa normal.   
Throat: Lips, mucosa, and tongue normal.   
Neck: Supple, symmetrical, trachea midline, no adenopathy. Lungs:   R ant chest with air entry. R post chest w absent bs over the lower 2/3. L with basilar crackles. Chest wall:  No tenderness or deformity. Heart:  Regular rate and rhythm, S1, S2 normal, no murmur, click, rub or gallop. Abdomen:   Soft, mildly tender. PEG in place Extremities: Extremities normal, atraumatic, no cyanosis or edema. Pulses: 2+ and symmetric all extremities. Skin: Skin color, texture, turgor normal. No rashes or lesions Lymph nodes: Cervical, supraclavicular nodes normal.  
Neurologic: Grossly nonfocal  
 
 
Lab Results Component Value Date/Time Sodium 139 04/18/2019 05:02 AM  
 Potassium 5.4 (H) 04/18/2019 05:02 AM  
 Chloride 110 (H) 04/18/2019 05:02 AM  
 CO2 23 04/18/2019 05:02 AM  
 BUN 26 (H) 04/18/2019 05:02 AM  
 Creatinine 2.11 (H) 04/18/2019 05:02 AM  
 Glucose 166 (H) 04/18/2019 05:02 AM  
 Calcium 9.9 04/18/2019 05:02 AM  
 Magnesium 1.7 04/16/2019 03:43 AM  
 Phosphorus 2.6 04/16/2019 03:43 AM  
 
 
Lab Results Component Value Date/Time WBC 11.6 (H) 04/18/2019 05:02 AM  
 HGB 12.6 04/18/2019 05:02 AM  
 PLATELET 228 21/44/3163 05:02 AM  
 MCV 91.7 04/18/2019 05:02 AM  
 
 
Lab Results Component Value Date/Time AST (SGOT) 13 (L) 04/14/2019 02:06 AM  
 Alk.  phosphatase 69 04/14/2019 02:06 AM  
 Protein, total 5.6 (L) 04/14/2019 02:06 AM  
 Albumin 2.3 (L) 04/16/2019 03:43 AM  
 Globulin 3.1 04/14/2019 02:06 AM  
 
 
No results found for: IRON, FE, TIBC, IBCT, PSAT, FERR 
 
 No results found for: SR, CRP, TALA, ANAIGG, RA, RPR, RPRT, VDRLT, VDRLS, TSH, TSHEXT, TSHEXT No results found for: PH, PHI, PCO2, PCO2I, PO2, PO2I, HCO3, HCO3I, FIO2, FIO2I No results found for: CPK, RCK1, RCK2, RCK3, RCK4, CKNDX, CKND1, TROPT, TROIQ, BNPP, BNP Lab Results Component Value Date/Time Culture result: NO GROWTH 4 DAYS 04/12/2019 12:27 PM  
 
 
No results found for: TOXA1, RPR, HBCM, HBSAG, HAAB, HCAB1, HAAT, G6PD, CRYAC, HIVGT, HIVR, HIV1, HIV12, HIVPC, HIVRPI No results found for: VANCT, CPK No results found for: COLOR, APPRN, SPGRU, HILDA, PROTU, GLUCU, KETU, BILU, BLDU, UROU, TRISH, LEUKU, WBCU, RBCU, UEPI, BACTU, CASTS, UCRY IMPRESSION 
· Malignant pleural effusion: adenocarcinoma. S/p Pleurx drain 4/16 · Esophageal cancer · Lung cancer · Suspected anal/rectal cancer · COPD · Sleep apnea (does not wear CPAP) PLAN 
· Goal sats >90% · Drain pleural fluid daily and monitor volume - starting Thurs · Cont with xopenex nebs · Pain control per Palliative Care · Patient and family considering hospice. No further work up planned per GI. Kelle Price

## 2019-04-18 NOTE — PROGRESS NOTES
SURGERY PROGRESS NOTE Admit Date: 2019 POD 1 Day Post-Op Procedure: Procedure(s): LAPAROSCOPIC CONVERTED TO OPEN  DIVERTING ILEOSTOMY Subjective:  
Nausea overnight improved with PEG to suction Pain not well controlled Objective:  
 
Visit Vitals /72 (BP 1 Location: Left arm, BP Patient Position: At rest) Pulse (!) 115 Temp 98.3 °F (36.8 °C) Resp 16 Ht 5' 9\" (1.753 m) Wt 65.3 kg (144 lb) SpO2 92% BMI 21.27 kg/m² Temp (24hrs), Av.6 °F (36.4 °C), Min:97 °F (36.1 °C), Max:98.3 °F (36.8 °C) Physical Exam: Abdomen:  Soft. Appropriately tender, ostomy congested no output Lab Results Component Value Date/Time WBC 11.6 (H) 2019 05:02 AM  
 HGB 12.6 2019 05:02 AM  
 Hemoglobin (POC) 17.0 2014 02:22 PM  
 HCT 39.7 2019 05:02 AM  
 Hematocrit (POC) 50 2014 02:22 PM  
 PLATELET 184  05:02 AM  
 MCV 91.7 2019 05:02 AM  
 
Lab Results Component Value Date/Time GFR est non-AA 33 (L) 2019 05:02 AM  
 GFRNA, POC 40 (L) 2018 04:15 PM  
 GFR est AA 40 (L) 2019 05:02 AM  
 GFRAA, POC 49 (L) 2018 04:15 PM  
 Creatinine 2.11 (H) 2019 05:02 AM  
 Creatinine (POC) 1.8 (H) 2018 04:15 PM  
 BUN 26 (H) 2019 05:02 AM  
 BUN (POC) 25 (H) 2014 02:22 PM  
 Sodium 139 2019 05:02 AM  
 Sodium (POC) 137 2014 02:22 PM  
 Potassium 5.4 (H) 2019 05:02 AM  
 Potassium (POC) 3.8 2014 02:22 PM  
 Chloride 110 (H) 2019 05:02 AM  
 Chloride (POC) 105 2014 02:22 PM  
 CO2 23 2019 05:02 AM  
 Magnesium 1.7 2019 03:43 AM  
 Phosphorus 2.6 2019 03:43 AM  
 
 
Assessment:  
 
Active Problems: Dyspnea (2019) Plan/Recommendations/Medical Decision Making: NPO until ostomy productive - ok for sips of water or ice chips Per patient pallative care was going to increase pain regimen OOB to chair Poor prognosis with disease burden noted in OR yesterday

## 2019-04-18 NOTE — PROGRESS NOTES
[facility] 
[address] [facility], [place], [address] [place] [address] 
[phone] Medical Progress Note NAME: Maxi Castle :  1970 MRM:  [id number] Date/Time: 2019  1:32 PM 
 
  
Assessment and Plan: 1. Dyspnea: Due to large pleural effusion, s/p 2.1L thoracentesis on admission however fluid reaccumulating. Exudative and likely malignant per pulm. Follow up cytology. S/p PleurX catheter. Monitor closely. 
  
2.  Rectal pain / ?anorectal carcinoma/ obstructing colon mass/ advanced carcinomatous disease: biopsy of anal mass was taken during colonoscopy, which showed benign colonic mucosa, but doubt it. Reviewed GI and gen surgical note and no plan to repeat Bx. During diverting ileostomy noted diffuse carcinomatosis with bulky partially obstructing disease at hepatic flexure. Finding was discussed with pt and family. Palliative care on board. 3.  Esophageal cancer: stricture noted on EGD. Outpatient follow up, s/p XRT. CT showed mild ascites. Poor prognosis 
  
4.  Lung CA: s/p chemo. Now with malignant effusion.  
  
5. CKD. Renal US showed no evidence of obstruction. Cr stable, monitor BMP 
  
6. PEG tube: not currently in use. If pt unable to maintain adequate PO intake could consider resuming tube feeds Subjective: Chief Complaint:  Follow up of pt who was admitted with SOB. abdominal pain ROS: 
(bold if positive, if negative) Tolerating PT  Tolerating Diet Objective:  
 
Last 24hrs VS reviewed since prior progress note. Most recent are: 
 
Visit Vitals /73 (BP 1 Location: Left arm, BP Patient Position: At rest) Pulse (!) 110 Temp 97.3 °F (36.3 °C) Resp 16 Ht 5' 9\" (1.753 m) Wt 65.3 kg (144 lb) SpO2 94% BMI 21.27 kg/m² SpO2 Readings from Last 6 Encounters:  
19 94% 14 99% 14 97% 14 97% 14 99% O2 Flow Rate (L/min): 2 l/min Intake/Output Summary (Last 24 hours) at 4/18/2019 9610 Last data filed at 4/18/2019 6604 Gross per 24 hour Intake 1850 ml Output 1350 ml Net 500 ml Physical Exam: 
 
Gen:  Well-developed, well-nourished, in no acute distress HEENT:  Pink conjunctivae, PERRL, hearing intact to voice, moist mucous membranes Neck:  Supple, without masses, thyroid non-tender Resp:  No accessory muscle use, decreased air entry more on the RT Card:  No murmurs, normal S1, S2 without thrills, bruits or peripheral edema Abd:  Soft, non-tender, non-distended, normoactive bowel sounds are present, no palpable organomegaly and no detectable hernias Lymph:  No cervical or inguinal adenopathy Musc:  No cyanosis or clubbing Skin:  No rashes or ulcers, skin turgor is good Neuro:  Cranial nerves are grossly intact, no focal motor weakness, follows commands appropriately Psych:  Good insight, oriented to person, place and time, alert 
__________________________________________________________________ Medications Reviewed: (see below) Medications:  
 
Current Facility-Administered Medications Medication Dose Route Frequency  aluminum-magnesium hydroxide (MAALOX) oral suspension 30 mL  30 mL Oral Q6H PRN  
 lidocaine (PF) (XYLOCAINE) 10 mg/mL (1 %) injection 0.1 mL  0.1 mL SubCUTAneous PRN  
 morphine injection 2 mg  2 mg IntraVENous Q2H PRN  
 levalbuterol (XOPENEX) nebulizer soln 1.25 mg/3 mL  1.25 mg Nebulization Q6H RT  
 sodium chloride (NS) flush 5-40 mL  5-40 mL IntraVENous Q8H  
 sodium chloride (NS) flush 5-40 mL  5-40 mL IntraVENous PRN  
 acetaminophen (TYLENOL) tablet 650 mg  650 mg Oral Q4H PRN  
 ondansetron (ZOFRAN) injection 4 mg  4 mg IntraVENous Q4H PRN  
 oxyCODONE (ROXICODONE INTENSOL) 20 mg/mL concentrated solution 5 mg  5 mg Oral Q4H PRN  
 sertraline (ZOLOFT) tablet 50 mg  50 mg Oral DAILY Lab Data Reviewed: (see below) Lab Review:  
 
Recent Labs 04/18/19 
0502 04/17/19 7258 04/16/19 
8695 WBC 11.6* 8.8 7.6 HGB 12.6 11.6* 11.1*  
HCT 39.7 35.8* 33.5*  
 227 182 Recent Labs 04/18/19 
0502 04/17/19 
1716 04/16/19 
6700  140 138  
K 5.4* 4.7 4.4 * 112* 112* CO2 23 22 20* * 177* 138* BUN 26* 26* 26* CREA 2.11* 1.97* 1.84* CA 9.9 8.8 8.9 MG  --   --  1.7 PHOS  --   --  2.6 ALB  --   --  2.3* Lab Results Component Value Date/Time Glucose (POC) 133 (H) 04/17/2019 12:25 PM  
 Glucose (POC) 130 (H) 04/12/2019 07:41 AM  
 Glucose (POC) 251 (H) 01/18/2014 02:22 PM  
 
No results for input(s): PH, PCO2, PO2, HCO3, FIO2 in the last 72 hours. No results for input(s): INR in the last 72 hours. No lab exists for component: INREXT, INREXT All Micro Results Procedure Component Value Units Date/Time CULTURE, BODY FLUID [[id number]] Collected:  04/12/19 1227 Order Status:  Completed Specimen:  Pleural Fluid Updated:  04/16/19 8296 Special Requests: NO SPECIAL REQUESTS     
  GRAM STAIN 3+ WBCS SEEN     
   NO ORGANISMS SEEN Culture result: NO GROWTH 4 DAYS I have reviewed notes of prior 24hr. Other pertinent lab: Total time spent with patient: 28 Care Plan discussed with: Patient, Nursing Staff and >50% of time spent in counseling and coordination of care Discussed:  Care Plan Prophylaxis:  SCD's Disposition:  Home w/Family 
        
___________________________________________________ Attending Physician: Marisabel Sawant MD

## 2019-04-18 NOTE — PROGRESS NOTES
Today's Updates/Plan 
  
1.  per pulm Pt's breathing slightly improved today.    
  
2. Hospice met with Pt and family. Family would like more time to discuss.  
  
3. GI has signed off 
  
3. CM will continue to follow for discharge planning.  
  
  
  
Discharge plan 
  
1.  Possibly discharge home with hospice.

## 2019-04-18 NOTE — PROGRESS NOTES
Nutrition Assessment: 
 
RECOMMENDATIONS/INTERVENTION(S):  
If PEG tube feeding needed: 
Jevity 1.5 @ 55 goal rate w  mL Q4H would meet nutritional needs. Pt currently on Regular diet to allow maximum number of choices. Likely needs to be NPO as he is currently getting suction from PEG tube. Monitor PO ability (nauseated), weight, GI status- ileostomy- no BM yet Cotninue Ensure HP per MD orders- watch BG.  
 
ASSESSMENT:  
4/18: Pt on suction from PEG- 700 mL + this morning per RN. Nauseated and not eating anything at this time. Palliative to meet later today. Monitor POC. Continue Regular diet or per pt preferences. No BM via ileostomy yet. Will continue to follow as needed. Consult if TF orders or other ONS needed. 4/17: Pt in OR to have diverting ostomy placed. Recommend GI lite after diet advanced if pt can tolerate PO sufficiently. If pt cannot tolerate enough PO to maintain weight, use PEG for TF. Type and volume recs above. Recommend starting PO or TF as soon as possible to include protein for wound healing after open abdominal surgery. Labs reviewed. BG elevated- 177 mg/d this morning, likely stress/anxiety related to surgery today. IVF D5 1/2 NS @ 75 mL/hr - provides 306 kcal of dextrose. 4/15: 44 yr old male admitted after attempted EGD and colonoscopy. PMHx: esophageal cancer as well as lung cancer, COPD, PEG, DM, HTN. MST for weight loss and PEG. Pt states he hasn't used the PEG in a \"long time\" and eats PO normally. Pt states appetite is fair and is kind of hungry at the moment 2/2 tray being taken away before he got to finish it. Recommended ONS, pt declined at this time. Doesn't drink any protein drinks at home. Pt claims 20 lbs (12%) weight loss over last 2 months. Significant for time frame. No weight hx in chart to verify. Pt denies n/v currently. States was nauseated earlier.  Pt discussed in IDR rounds- pt may need plurex and maybe diversion of GI tract 2/2 mass found. Awaiting path from colonoscopy and POC. Pt currently on GI lite diet. Encourage PO. , 141, 116 mg/dL. A1C 6.5. Phos 2.5. Consult if TF is needed or diet ed for ostomy if placed. SUBJECTIVE/OBJECTIVE:  
Diet Order: Regular 
% Eaten:  No data found. Pertinent Medications: [x] Reviewed Labs reviewed:  [x] Anthropometrics: Height: 5' 9\" (175.3 cm) Weight: 65.3 kg (144 lb) IBW (%IBW):   ( ) UBW (%UBW):   (  %) BMI: Body mass index is 21.27 kg/m². This BMI is indicative of: 
 
 [] Underweight    [x] Normal    [] Overweight    []  Obesity    []  Extreme Obesity (BMI>40) Estimated Nutrition Needs (Based on): 1948 Kcals/day(BMR(1499x1.3)) , 65 g(-78g/day(1.0-1.2g/kg)) Protein Carbohydrate: At Least 130 g/day  Fluids: 1950 mL/day (1mL/kg rounded to 50 mL) Last BM: 4/16- mucous   [x]Active     []Hyperactive  []Hypoactive       [] Absent   BS Skin:    [] Intact   [x] Incision- abdomen  [] Breakdown   [] DTI   [] Tears/Excoriation/Abrasion  []Edema [] Other: Wt Readings from Last 30 Encounters:  
04/12/19 65.3 kg (144 lb) 06/09/14 87.1 kg (192 lb) 04/09/14 87.7 kg (193 lb 6.4 oz) 03/06/14 87.8 kg (193 lb 9.6 oz) 02/10/14 86.5 kg (190 lb 9.6 oz) 01/30/14 87.1 kg (192 lb)  
01/18/14 83.9 kg (185 lb) NUTRITION DIAGNOSES:  
Problem:  Altered GI function Etiology: related to alteration in GI structure/fucntion Signs/Symptoms: as evidenced by PEG, esophageal cancer, possible rectal cancer- awaiting path NUTRITION INTERVENTIONS: 
Meals/Snacks: General/healthful diet Enteral/Parenteral Nutrition: Initiate enteral nutrition Supplements: Commercial supplement GOAL:  
Pt will consume >50% of meals and have BM within 3-5 days Cultural, Scientology, or Ethnic Dietary Needs: None LEARNING NEEDS (Diet, Food/Nutrient-Drug Interaction):  
 [x] None Identified [] Identified and Education Provided/Documented 
 [] Identified and Pt declined/was not appropriate [x] Interdisciplinary Care Plan Reviewed/Documented  
 [x] Discharge Needs: TF vs PO - ostomy diet, high protein [] No Nutrition Related Discharge Needs NUTRITION RISK:  
Pt Is At Nutrition Risk  [x] No Nutrition Risk Identified  [] PT SEEN FOR:  
 []  MD Consult: []Calorie Count []Diabetic Diet Education []Diet Education []Electrolyte Management []General Nutrition Management and Supplements []Management of Tube Feeding []TPN Recommendations []  RN Referral:  []MST score >=2 
   []Enteral/Parenteral Nutrition PTA []Pregnant: Gestational DM or Multigestation  
              [] Pressure Ulcer 
   
[]  Low BMI      []  Length of Stay       [] Dysphagia Diet     [] Ventilator      [x] Follow-Up Previous Recommendations: 
 [x] Implemented          [] Not Implemented          [] Not Applicable Previous Goal: 
 [x] Met              [] Progressing Towards Goal              [] Not Progressing Towards Goal   [] Not Applicable [address] Pager: [phone] Office: [phone]

## 2019-04-19 NOTE — PROGRESS NOTES
[facility] Dosing Services:  
 
Pharmacist Renal Dosing Progress Note for levaquin Physician Kanika Oven The following medication: levaquin was automatically dose-adjusted per [facility] P&T Committee Protocol, with respect to renal function. Consult provided for this   44 y.o. , male , for the indication of UTI. Pt Weight:  
Wt Readings from Last 1 Encounters:  
04/12/19 65.3 kg (144 lb) Previous Regimen Levaquin 750 mg IV q 24 hours Serum Creatinine Lab Results Component Value Date/Time Creatinine 2.44 (H) 04/19/2019 01:38 AM  
 Creatinine (POC) 1.8 (H) 02/26/2018 04:15 PM  
 
   
Creatinine Clearance Estimated Creatinine Clearance: 33.1 mL/min (A) (based on SCr of 2.44 mg/dL (H)). BUN Lab Results Component Value Date/Time BUN 37 (H) 04/19/2019 01:38 AM  
 BUN (POC) 25 (H) 01/18/2014 02:22 PM  
 
   
Dosage changed to:  levaquin 750 mg IV q 48 hours for crcl 33 Additional notes: 
 
 
Pharmacy to continue to monitor patient daily. Will make dosage adjustments based upon changing renal function. Signed Ashtyn Sutton 53 information:  [phone]

## 2019-04-19 NOTE — PROGRESS NOTES
Assessment / Plan:  
Feels better today Post op ileus as expected NPO until ostomy productive Continue g tube to suction May have popsicles OOB to chair Pain better controlled Charleen Medellin MD 
 
 
General Surgery Daily Progress Note Patient: Pierre Cervantes MRN: [id number]  SSN: [id number] YOB: 1970  Age: 44 y.o. Sex: male Admit Date: 4/12/2019 Subjective:  
Patient feels better today Current Facility-Administered Medications Medication Dose Route Frequency  levoFLOXacin (LEVAQUIN) 750 mg in D5W IVPB  750 mg IntraVENous Q48H  
 aluminum-magnesium hydroxide (MAALOX) oral suspension 30 mL  30 mL Oral Q6H PRN  
 0.9% sodium chloride infusion  100 mL/hr IntraVENous CONTINUOUS  
 morphine injection 4 mg  4 mg IntraVENous Q3H PRN  
 lidocaine (PF) (XYLOCAINE) 10 mg/mL (1 %) injection 0.1 mL  0.1 mL SubCUTAneous PRN  
 levalbuterol (XOPENEX) nebulizer soln 1.25 mg/3 mL  1.25 mg Nebulization Q6H RT  
 sodium chloride (NS) flush 5-40 mL  5-40 mL IntraVENous Q8H  
 sodium chloride (NS) flush 5-40 mL  5-40 mL IntraVENous PRN  
 acetaminophen (TYLENOL) tablet 650 mg  650 mg Oral Q4H PRN  
 ondansetron (ZOFRAN) injection 4 mg  4 mg IntraVENous Q4H PRN  
 oxyCODONE (ROXICODONE INTENSOL) 20 mg/mL concentrated solution 5 mg  5 mg Oral Q4H PRN  
 sertraline (ZOLOFT) tablet 50 mg  50 mg Oral DAILY Objective:  
04/19 0701 - 04/19 1900 In: -  
Out: 860 [Urine:180; Drains:680] 04/17 1901 - 04/19 0700 In: -  
Out: 107 [facility] [Drains:2300] Patient Vitals for the past 8 hrs: 
 BP Temp Pulse Resp SpO2  
04/19/19 1326     93 % 04/19/19 1159  98.3 °F (36.8 °C)     
04/19/19 1112   (!) 108    
04/19/19 1105 95/65  (!) 121 18 95 % 04/19/19 0921 125/71  (!) 124  97 % 04/19/19 0835     91 % 04/19/19 0817 (!) 85/63      
04/19/19 0814 (!) 84/58  (!) 118  90 % 04/19/19 0731 99/65  [place]  04/19/19 0729 (!) 89/66 97.7 °F (36.5 °C) (!) 118 17 90 % Physical Exam: 
General: NAD Abd: soft appropriately tender ileostomy congested and edematous no output, incision c/d/i Labs:  
Recent Labs 04/19/19 
5335 WBC 11.5* HGB 10.9* HCT 34.4*  
 Recent Labs 04/19/19 
7764   
K 5.3*  
* CO2 27 * BUN 37* CREA 2.44* CA 9.0 Active Problems: Dyspnea (4/12/2019) Problem List Items Addressed This Visit Dyspnea Other Visit Diagnoses Cancer associated pain Rectal or anal pain Advanced care planning/counseling discussion Goals of care, counseling/discussion Carcinomatosis ([facility] [address] DNR (do not resuscitate) discussion

## 2019-04-19 NOTE — PROGRESS NOTES
Name: Herminia [address]: [facility]  
: 1970 Admit Date: 2019 Phone:   Room: 535/01 PCP: Odessa Blackwood MD  MRN: [id number] Date: 2019  Code: Full Code Chart and notes reviewed. Data reviewed. I review the patient's current medications in the medical record at each encounter. I have evaluated and examined the patient. History of Present Illness: 
Mr. Ynes Faith is a 43 yo gentleman with a history esophageal cancer, lung cancer, LIZETH (does not wear CPAP), COPD, and new suspected anal/rectal cancer. Found to have a large right sided pleural effusion. Underwent colonoscopy and EGD today. Had his PEG replaced and found to have significant anal/rectal stenosis, suspicious for malignancy. He had a chest x-ray for shortness of breath and found to have a right sided effusion. He tells me has been short of breath for quite some time and attributed it to his COPD. No cough, chest pain, f/c. 
 
Labs reviewed: no new labs today other than a glucose of 130 I have personally reviewed chest CT from [facility] 2019. Pt with mod R effusion at that time and mass like lesion in RLL. PET scan in Feb with increased uptake in RLL w mod effusion. New R paracolic soft tissue mass w increased uptake Interval Hx: Pt with saturation of the Pleurx drain dressing overnight. Had pleural fluid removed twice and had 2300 cc taken off in total.  Pt with hiccups. Not sob. + abd pain. No pain in chest. 
 
Past Medical History:  
Diagnosis Date  Cancer [facility])   
 esophagus cancer, right lung cancer  Chronic obstructive pulmonary disease ([facility] [address]  Diabetes ([facility] [address]  Hypertension  Sleep apnea   
 no cpap Past Surgical History:  
Procedure Laterality Date  COLONOSCOPY N/A 2019 COLONOSCOPY performed by Rosibel Solorio MD at [address] HX ENDOSCOPY    
 w/ PEG tube  HX HEENT    
 14years old had eye surgery  IR INSERT CATH PLEURAL INDWELL  2019 Family History Problem Relation Age of Onset  Heart Disease Mother  Hypertension Mother  Heart Disease Brother  Heart Disease Maternal Grandmother  Heart Disease Paternal Grandmother Social History Tobacco Use  Smoking status: Former Smoker Last attempt to quit: 2018 Years since quittin.1  Smokeless tobacco: Never Used Substance Use Topics  Alcohol use: No  
 
 
Allergies Allergen Reactions  Codeine Other (comments)  
  hallucinations  Pcn [Penicillins] Hives States \"it's a childhood allergy\" Current Facility-Administered Medications Medication Dose Route Frequency  aluminum-magnesium hydroxide (MAALOX) oral suspension 30 mL  30 mL Oral Q6H PRN  
 0.9% sodium chloride infusion  100 mL/hr IntraVENous CONTINUOUS  
 morphine injection 4 mg  4 mg IntraVENous Q3H PRN  
 lidocaine (PF) (XYLOCAINE) 10 mg/mL (1 %) injection 0.1 mL  0.1 mL SubCUTAneous PRN  
 levalbuterol (XOPENEX) nebulizer soln 1.25 mg/3 mL  1.25 mg Nebulization Q6H RT  
 sodium chloride (NS) flush 5-40 mL  5-40 mL IntraVENous Q8H  
 sodium chloride (NS) flush 5-40 mL  5-40 mL IntraVENous PRN  
 acetaminophen (TYLENOL) tablet 650 mg  650 mg Oral Q4H PRN  
 ondansetron (ZOFRAN) injection 4 mg  4 mg IntraVENous Q4H PRN  
 oxyCODONE (ROXICODONE INTENSOL) 20 mg/mL concentrated solution 5 mg  5 mg Oral Q4H PRN  
 sertraline (ZOLOFT) tablet 50 mg  50 mg Oral DAILY REVIEW OF SYSTEMS  
12 point ROS negative except as stated in the HPI. Physical Exam:  
Visit Vitals /71 (BP 1 Location: Right arm, BP Patient Position: At rest) Pulse (!) 124 Temp 97.7 °F (36.5 °C) Resp 17 Ht 5' 9\" (1.753 m) Wt 65.3 kg (144 lb) SpO2 97% BMI 21.27 kg/m² General:  Alert, cooperative, ill appearing, no acute distress, appears stated age. Head:  Normocephalic, without obvious abnormality, atraumatic. Eyes:  Conjunctivae/corneas clear. Nose: Nares normal. Septum midline. Mucosa normal.   
Throat: Lips, mucosa, and tongue normal.   
Neck: Supple, symmetrical, trachea midline, no adenopathy. Lungs:   R ant chest with air entry. R post chest w absent bs over the lower 2/3. R pleurx dressing intact and dry Chest wall:  No tenderness or deformity. Heart:  Regular rate and rhythm, S1, S2 normal, no murmur, click, rub or gallop. Abdomen:   Soft, mildly tender. PEG in place Extremities: Extremities normal, atraumatic, no cyanosis or edema. Pulses: 2+ and symmetric all extremities. Skin: Skin color, texture, turgor normal. No rashes or lesions Lymph nodes: Cervical, supraclavicular nodes normal.  
Neurologic: Grossly nonfocal  
 
 
Lab Results Component Value Date/Time Sodium 143 04/19/2019 01:38 AM  
 Potassium 5.3 (H) 04/19/2019 01:38 AM  
 Chloride 110 (H) 04/19/2019 01:38 AM  
 CO2 27 04/19/2019 01:38 AM  
 BUN 37 (H) 04/19/2019 01:38 AM  
 Creatinine 2.44 (H) 04/19/2019 01:38 AM  
 Glucose 152 (H) 04/19/2019 01:38 AM  
 Calcium 9.0 04/19/2019 01:38 AM  
 Magnesium 1.7 04/16/2019 03:43 AM  
 Phosphorus 2.6 04/16/2019 03:43 AM  
 
 
Lab Results Component Value Date/Time WBC 11.5 (H) 04/19/2019 01:38 AM  
 HGB 10.9 (L) 04/19/2019 01:38 AM  
 PLATELET 360 46/89/8789 01:38 AM  
 MCV 92.0 04/19/2019 01:38 AM  
 
 
Lab Results Component Value Date/Time AST (SGOT) 13 (L) 04/14/2019 02:06 AM  
 Alk. phosphatase 69 04/14/2019 02:06 AM  
 Protein, total 5.6 (L) 04/14/2019 02:06 AM  
 Albumin 2.3 (L) 04/16/2019 03:43 AM  
 Globulin 3.1 04/14/2019 02:06 AM  
 
 
No results found for: IRON, FE, TIBC, IBCT, PSAT, FERR No results found for: SR, CRP, TALA, ANAIGG, RA, RPR, RPRT, VDRLT, VDRLS, TSH, TSHEXT, TSHEXT No results found for: PH, PHI, PCO2, PCO2I, PO2, PO2I, HCO3, HCO3I, FIO2, FIO2I No results found for: CPK, RCK1, RCK2, RCK3, RCK4, CKNDX, CKND1, TROPT, TROIQ, BNPP, BNP  
 
 Lab Results Component Value Date/Time Culture result: NO GROWTH 4 DAYS 04/12/2019 12:27 PM  
 
 
No results found for: TOXA1, RPR, HBCM, HBSAG, HAAB, HCAB1, HAAT, G6PD, CRYAC, HIVGT, HIVR, HIV1, HIV12, HIVPC, HIVRPI No results found for: VANCT, CPK Lab Results Component Value Date/Time Color RED 04/19/2019 09:34 AM  
 Appearance BLOODY (A) 04/19/2019 09:34 AM  
 pH (UA) 5.5 04/19/2019 09:34 AM  
 Protein 300 (A) 04/19/2019 09:34 AM  
 Glucose NEGATIVE  04/19/2019 09:34 AM  
 Ketone 15 (A) 04/19/2019 09:34 AM  
 Blood LARGE (A) 04/19/2019 09:34 AM  
 Urobilinogen 1.0 04/19/2019 09:34 AM  
 Nitrites POSITIVE (A) 04/19/2019 09:34 AM  
 Leukocyte Esterase MODERATE (A) 04/19/2019 09:34 AM  
 WBC 10-20 04/19/2019 09:34 AM  
 RBC >100 (H) 04/19/2019 09:34 AM  
 Bacteria 3+ (A) 04/19/2019 09:34 AM  
 
 
IMPRESSION 
· Malignant pleural effusion: adenocarcinoma. S/p Pleurx drain 4/16. Given size of effusion, not suprising to have some drainage around tube. Seems better with w/drawal of 2600 cc last 24 hours. · Esophageal cancer · Lung cancer · Suspected anal/rectal cancer · COPD · Sleep apnea (does not wear CPAP) PLAN 
· Goal sats >90% · Cont to drain pleurx daily for now. Monitor volume · Cont with xopenex nebs · Pain control per Palliative Care · Patient and family considering hospice. No further work up planned per GI. · Will check cxr on Mon Will see as needed over the weekend Milton Diaz MD

## 2019-04-19 NOTE — PROGRESS NOTES
Wound/Ostomy Follow up: 
Patient is S/P open diverting ileostomy for obstructing colon mass on 4/17/19. Seen by wound care 4/17 for management of post-op bleeding. Today changed Pouch and assessed stoma. Pouch had 50cc of serosanguinous drainage. Removed pouch and noted pink, budded, moist stoma with a mucous film and red rubber bridge in place. Surgicel was present to the inferior aspect of the stoma. No active bleeding at this time. Khushboo-stomal skin visibile to midline incision has a mild bluish discoloration. Midline surgical site is intact and closed with surgical glue. Edges approximated. Raised area remains between the surgical incision and the stoma site. Patient continues to have pain with light manipulation to the area. 2 3/4 pouch was reapplied over the intact ostomy wafer. Surgical and ostomy supplies are at bedside. Patient tolerated pouch change and Student RN is at bedside.   
 
Will Continue to follow, consult as needed,  
Newton Barroso BSN RN [facility]

## 2019-04-19 NOTE — PROGRESS NOTES
[place] [facility] 79 
380 [facility], [address] [place] [address] 
[phone] Medical Progress Note NAME: Richard Monroe :  1970 MRM:  [id number] Date/Time: 2019  1:32 PM 
 
  
Assessment and Plan: 1. Dyspnea: Due to large pleural effusion, s/p 2.1L thoracentesis on admission however fluid reaccumulating. Exudative and likely malignant per pulm. Follow up cytology. S/p PleurX catheter.   
  
2.  Rectal pain / ?anorectal carcinoma/ obstructing colon mass/ advanced carcinomatous disease: biopsy of anal mass was taken during colonoscopy, which showed benign colonic mucosa, but doubt it. Reviewed GI and gen surgical note and no plan to repeat Bx. During diverting ileostomy noted diffuse carcinomatosis with bulky partially obstructing disease at hepatic flexure. Finding was discussed with pt and family. Palliative care on board( pt hasn't decided goal of care yet). 3.  Esophageal cancer: stricture noted on EGD. Outpatient follow up, s/p XRT. CT showed mild ascites. Poor prognosis 
  
4.  Lung CA: s/p chemo. Now with malignant effusion.  
  
5. CKD. Renal US showed no evidence of obstruction. Cr stable, monitor BMP 
  
6. PEG tube: not currently in use. If pt unable to maintain adequate PO intake could consider resuming tube feeds 7. Hypotension/ tachycardia. Continue IVF with bolus. 8.  Nausea/ vomiting/ abdominal pain. KUB: Mildly dilated small bowel loops in the lower abdomen could indicate ileus or 
early obstruction. Keep NOP. gen surgery following Subjective: Chief Complaint:  Follow up of pt who was admitted with SOB. C/o hiccups. ROS: 
(bold if positive, if negative) Tolerating PT  Tolerating Diet Objective:  
 
Last 24hrs VS reviewed since prior progress note. Most recent are: 
 
Visit Vitals BP (!) 85/63 (BP 1 Location: Left arm, BP Patient Position: At rest) Pulse (!) 118 Temp 97.7 °F (36.5 °C) Resp 17  
  5' 9\" (1.753 m) Wt 65.3 kg (144 lb) SpO2 90% BMI 21.27 kg/m² SpO2 Readings from Last 6 Encounters:  
04/19/19 90% 06/09/14 99% 04/09/14 97% 01/30/14 97% 01/18/14 99% O2 Flow Rate (L/min): 2 l/min Intake/Output Summary (Last 24 hours) at 4/19/2019 2732 Last data filed at 4/19/2019 0700 Gross per 24 hour Intake  Output 3575 ml Net -3575 ml Physical Exam: 
 
Gen:  Well-developed, well-nourished, in no acute distress HEENT:  Pink conjunctivae, PERRL, hearing intact to voice, moist mucous membranes Neck:  Supple, without masses, thyroid non-tender Resp:  No accessory muscle use, decreased air entry more on the RT Card:  No murmurs, normal S1, S2 without thrills, bruits or peripheral edema Abd:  Soft, non-tender, non-distended, normoactive bowel sounds are present, no palpable organomegaly and no detectable hernias Lymph:  No cervical or inguinal adenopathy Musc:  No cyanosis or clubbing Skin:  No rashes or ulcers, skin turgor is good Neuro:  Cranial nerves are grossly intact, no focal motor weakness, follows commands appropriately Psych:  Good insight, oriented to person, place and time, alert 
__________________________________________________________________ Medications Reviewed: (see below) Medications:  
 
Current Facility-Administered Medications Medication Dose Route Frequency  aluminum-magnesium hydroxide (MAALOX) oral suspension 30 mL  30 mL Oral Q6H PRN  
 0.9% sodium chloride infusion  75 mL/hr IntraVENous CONTINUOUS  
 morphine injection 4 mg  4 mg IntraVENous Q3H PRN  
 lidocaine (PF) (XYLOCAINE) 10 mg/mL (1 %) injection 0.1 mL  0.1 mL SubCUTAneous PRN  
 levalbuterol (XOPENEX) nebulizer soln 1.25 mg/3 mL  1.25 mg Nebulization Q6H RT  
 sodium chloride (NS) flush 5-40 mL  5-40 mL IntraVENous Q8H  
 sodium chloride (NS) flush 5-40 mL  5-40 mL IntraVENous PRN  
 acetaminophen (TYLENOL) tablet 650 mg  650 mg Oral Q4H PRN  
  ondansetron (ZOFRAN) injection 4 mg  4 mg IntraVENous Q4H PRN  
 oxyCODONE (ROXICODONE INTENSOL) 20 mg/mL concentrated solution 5 mg  5 mg Oral Q4H PRN  
 sertraline (ZOLOFT) tablet 50 mg  50 mg Oral DAILY Lab Data Reviewed: (see below) Lab Review:  
 
Recent Labs 04/19/19 
2614 04/18/19 
0502 04/17/19 
8358 WBC 11.5* 11.6* 8.8 HGB 10.9* 12.6 11.6* HCT 34.4* 39.7 35.8*  243 227 Recent Labs 04/19/19 
5019 04/18/19 
0502 04/17/19 
2731  139 140  
K 5.3* 5.4* 4.7 * 110* 112* CO2 27 23 22 * 166* 177* BUN 37* 26* 26* CREA 2.44* 2.11* 1.97* CA 9.0 9.9 8.8 Lab Results Component Value Date/Time Glucose (POC) 133 (H) 04/17/2019 12:25 PM  
 Glucose (POC) 130 (H) 04/12/2019 07:41 AM  
 Glucose (POC) 251 (H) 01/18/2014 02:22 PM  
 
No results for input(s): PH, PCO2, PO2, HCO3, FIO2 in the last 72 hours. No results for input(s): INR in the last 72 hours. No lab exists for component: INREXT, INREXT All Micro Results Procedure Component Value Units Date/Time CULTURE, BODY FLUID [[id number]] Collected:  04/12/19 1227 Order Status:  Completed Specimen:  Pleural Fluid Updated:  04/16/19 1389 Special Requests: NO SPECIAL REQUESTS     
  GRAM STAIN 3+ WBCS SEEN     
   NO ORGANISMS SEEN Culture result: NO GROWTH 4 DAYS I have reviewed notes of prior 24hr. Other pertinent lab: Total time spent with patient: 28 Care Plan discussed with: Patient, Nursing Staff and >50% of time spent in counseling and coordination of care Discussed:  Care Plan Prophylaxis:  SCD's Disposition:  Home w/Family 
        
___________________________________________________ Attending Physician: Davian Au MD

## 2019-04-19 NOTE — PROGRESS NOTES
Bedside and Verbal shift change report given to Aanly Calvillo RN (oncoming nurse) by Mayelin Newman RN (offgoing nurse). Report included the following information SBAR, Kardex, Procedure Summary, Intake/Output, MAR and Recent Results.

## 2019-04-19 NOTE — PROGRESS NOTES
Palliative Medicine Consult Patient Name: Jose Manuel Diaz YOB: 1970 Date of Initial Consult: 4/12/2019 Reason for Consult: Dr. Raven Trejo Requesting Provider: Overwhelming symptoms Primary Care Physician: J Carlos Umanzor MD 
  
 SUMMARY:  
Jose Manuel Diaz is a 44 y.o. with a past history of lung cancer, esophageal cancer hypertension, COPD, status post PEG, who was admitted on 4/12/2019 from home with a diagnosis of newly diagnosed likely anal or rectal CA. Current medical issues leading to Palliative Medicine involvement include: Overwhelming symptoms. History of present illness/past medical history patient is a 57-year-old gentleman with a history of esophageal cancer as well as lung cancer. He is followed Dr. Candace Palomares from [facility]. He was supposed to see [facility] in approximately 2 weeks for follow-up on his esophageal cancer. He already has a PEG placed secondary to his esophageal cancer. He states he had been on some type of \"oral chemotherapy agent but this was causing significant side effects and so had recently been stopped. He is been having significant anal type pain and had seen Dr. Raven Trejo as an outpatient. He presents today for attempts at a colonoscopy as well as repeat EGD. Unfortunately EGD showed what appears to be malignant narrowing at 37 to 40 cm. In addition, attempted a colonoscopy were not able to be completed secondary to significant needle stricture, sclerosis. Even a pediatric colonoscope could not pass the anal stenosis. Ultimately an upper GI scope was used to bypass into the rectal area which profuse stool was seen throughout. The anal canal is friable firm and was concerning for anal carcinoma. He was having significant pain and discomfort but that has seemed to improve by the time I have seen the patient. He is waiting Colorectal surgeon evaluation.   He admits that his bowel movements have been less over the last couple months and describes very small stools. His breathing has been worse over the last several weeks. He had used tramadol at home to help with his pain but that gave no relief. Social history he is  to Denis. His sister, Kayy Huerta is also very involved and is in the room. PALLIATIVE DIAGNOSES:  
1. Cancer associated pain 2. Shortness of breathimproved after thoracentesis on the right with 2 L removed 3. 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 4. Esophageal cancer 5. Lung cancer 6. Advance care planning 7. DNR discussion PLAN:  
1. Met with patient and spouse. Pain is better with adjustment in the morphine. He has thought some about our discussion yesterday. He remains clear on going home when stable surgically. Xray show ileus and still with fair amount of output. 2. Goals of care they seem on board with home with hospice when stable. We have left all the information on Hospice and I will f/u on Monday. 3. Advance care planning he has completed an advanced medical directive. He has assigned his wife as primary medical power of  and his sister at secondary medical power of . I have asked his family to bring a copy to the hospital. Sister will bring 4. CODE STATUS addressed resuscitation with him again today. He is continuing to think about it. 5. Symptom management he has used morphine 4 mg IV x 5 doses. Will continue this dosing for now since he really can not take po well at this point and I wonder about absorption. Psychosocialno spiritual concerns. He does have very good support from his spouse and sister. 6. Discussed with Dr. Shahid Gallo, bedside nurse and Dr. Ricky Jorgensen 7. Initial consult note routed to primary continuity provider 8. Communicated plan of care with: Palliative IDT GOALS OF CARE / TREATMENT PREFERENCES:  
[====Goals of Care====] GOALS OF CARE: 
Patient/Health Care Proxy Stated Goals: Prolong life TREATMENT PREFERENCES:  
Code Status: Full Code Advance Care Planning: 
Advance Care Planning 4/13/2019 Confirm Advance Directive Yes, not on file Medical Interventions: Full interventions Other Instructions:  
Artificially Administered Nutrition: Feeding tube long-term, if indicated The palliative care team has discussed with patient / health care proxy about goals of care / treatment preferences for patient. 
[====Goals of Care====] HISTORY:  
 
History obtained from: Chart, spouse, sister CHIEF COMPLAINT: Rectal pain HPI/SUBJECTIVE: The patient is:  
[x] Verbal and participatory [] Non-participatory due to:  
Patient is feeling much better. He has had a thoracentesis that removed 2 L of fluid. Feels like his breathing is much improved. 4/17 patient is just returned from the operating room and is still a little bit lethargic. 4/18-awake and alert. Still pain in the abdomen 4/19-pain is better. Lots of blood in the pleur-x drain-did contact pulmonary via bedside nurse Clinical Pain Assessment (nonverbal scale for severity on nonverbal patients):  
Clinical Pain Assessment Severity: 3 Location: Anal area Character: Throbbing and aching Duration: Months Effect: Difficult to have a bowel movement Factors: Constipation Frequency: Intermittent Adult Nonverbal Pain Scale Face: No particular expression or smile Activity (Movement): Lying quietly, normal position Guarding: Lying quietly, no positioning of hands over areas of body Physiology (Vital Signs): Stable vital signs Respiratory: Baseline RR/SpO2 compliant with ventilator Total Score: 0 
 
 
 FUNCTIONAL ASSESSMENT:  
 
Palliative Performance Scale (PPS): PPS: 70 PSYCHOSOCIAL/SPIRITUAL SCREENING:  
 
Advance Care Planning: 
Advance Care Planning 4/13/2019 Confirm Advance Directive Yes, not on file Any spiritual / Sikhism concerns: 
[] Yes /  [x] No 
 
Caregiver Burnout: 
[] Yes /  [x] No /  [] No Caregiver Present Anticipatory grief assessment:  
[x] Normal  / [] Maladaptive ESAS Anxiety: Anxiety: 0 
 
ESAS Depression: Depression: 0 REVIEW OF SYSTEMS:  
 
Positive and pertinent negative findings in ROS are noted above in HPI. The following systems were [x] reviewed / [] unable to be reviewed as noted in HPI Other findings are noted below. Systems: constitutional, ears/nose/mouth/throat, respiratory, gastrointestinal, genitourinary, musculoskeletal, integumentary, neurologic, psychiatric, endocrine. Positive findings noted below. Modified ESAS Completed by: provider Fatigue: 2 Drowsiness: 0 Depression: 0 Pain: 3 Anxiety: 0 Nausea: 0 Anorexia: 3 Dyspnea: 4 Constipation: Yes Stool Occurrence(s): 1 PHYSICAL EXAM:  
 
From RN flowsheet: 
Wt Readings from Last 3 Encounters:  
04/12/19 144 lb (65.3 kg) 06/09/14 192 lb (87.1 kg) 04/09/14 193 lb 6.4 oz (87.7 kg) Blood pressure 95/65, pulse (!) 108, temperature 98.3 °F (36.8 °C), resp. rate 18, height 5' 9\" (1.753 m), weight 144 lb (65.3 kg), SpO2 93 %. Pain Scale 1: Numeric (0 - 10) Pain Intensity 1: 6 Pain Onset 1: During xray Pain Location 1: Rectal 
Pain Orientation 1: Left Pain Description 1: Sore Pain Intervention(s) 1: Medication (see MAR) Last bowel movement, if known:  
 
Constitutional: Thin, alert and oriented, sleepy Eyes: pupils equal, anicteric ENMT: no nasal discharge, moist mucous membranes Cardiovascular: regular rhythm, distal pulses intact Respiratory: breathing not labored, decreased on the right Gastrointestinal: soft, PEG in place, ileostomy noted, Mild diffuseTTP Musculoskeletal: no deformity, no tenderness to palpation Skin: warm, dry Neurologic: following commands, moving all extremities Psychiatric: full affect, no hallucinations Other: 
 
 
 HISTORY:  
 
Active Problems: Dyspnea (2019) Past Medical History:  
Diagnosis Date  Cancer [facility])   
 esophagus cancer, right lung cancer  Chronic obstructive pulmonary disease ([facility]  Diabetes ([facility]  Hypertension  Sleep apnea   
 no cpap Past Surgical History:  
Procedure Laterality Date  COLONOSCOPY N/A 2019 COLONOSCOPY performed by Dorinda Herrera MD at [address] HX ENDOSCOPY    
 w/ PEG tube  HX HEENT    
 14years old had eye surgery  IR INSERT CATH PLEURAL INDWELL  2019 Family History Problem Relation Age of Onset  Heart Disease Mother  Hypertension Mother  Heart Disease Brother  Heart Disease Maternal Grandmother  Heart Disease Paternal Grandmother History reviewed, no pertinent family history. Social History Tobacco Use  Smoking status: Former Smoker Last attempt to quit: 2018 Years since quittin.1  Smokeless tobacco: Never Used Substance Use Topics  Alcohol use: No  
 
Allergies Allergen Reactions  Codeine Other (comments)  
  hallucinations  Pcn [Penicillins] Hives States \"it's a childhood allergy\" Current Facility-Administered Medications Medication Dose Route Frequency  levoFLOXacin (LEVAQUIN) 750 mg in D5W IVPB  750 mg IntraVENous Q48H  
 aluminum-magnesium hydroxide (MAALOX) oral suspension 30 mL  30 mL Oral Q6H PRN  
 0.9% sodium chloride infusion  100 mL/hr IntraVENous CONTINUOUS  
 morphine injection 4 mg  4 mg IntraVENous Q3H PRN  
 lidocaine (PF) (XYLOCAINE) 10 mg/mL (1 %) injection 0.1 mL  0.1 mL SubCUTAneous PRN  
 levalbuterol (XOPENEX) nebulizer soln 1.25 mg/3 mL  1.25 mg Nebulization Q6H RT  
 sodium chloride (NS) flush 5-40 mL  5-40 mL IntraVENous Q8H  
 sodium chloride (NS) flush 5-40 mL  5-40 mL IntraVENous PRN  
  acetaminophen (TYLENOL) tablet 650 mg  650 mg Oral Q4H PRN  
 ondansetron (ZOFRAN) injection 4 mg  4 mg IntraVENous Q4H PRN  
 oxyCODONE (ROXICODONE INTENSOL) 20 mg/mL concentrated solution 5 mg  5 mg Oral Q4H PRN  
 sertraline (ZOLOFT) tablet 50 mg  50 mg Oral DAILY  
 
 
 
 LAB AND IMAGING FINDINGS:  
 
Lab Results Component Value Date/Time WBC 11.5 (H) 04/19/2019 01:38 AM  
 HGB 10.9 (L) 04/19/2019 01:38 AM  
 PLATELET 725 58/20/0442 01:38 AM  
 
Lab Results Component Value Date/Time Sodium 143 04/19/2019 01:38 AM  
 Potassium 5.3 (H) 04/19/2019 01:38 AM  
 Chloride 110 (H) 04/19/2019 01:38 AM  
 CO2 27 04/19/2019 01:38 AM  
 BUN 37 (H) 04/19/2019 01:38 AM  
 Creatinine 2.44 (H) 04/19/2019 01:38 AM  
 Calcium 9.0 04/19/2019 01:38 AM  
 Magnesium 1.7 04/16/2019 03:43 AM  
 Phosphorus 2.6 04/16/2019 03:43 AM  
  
Lab Results Component Value Date/Time AST (SGOT) 13 (L) 04/14/2019 02:06 AM  
 Alk. phosphatase 69 04/14/2019 02:06 AM  
 Protein, total 5.6 (L) 04/14/2019 02:06 AM  
 Albumin 2.3 (L) 04/16/2019 03:43 AM  
 Globulin 3.1 04/14/2019 02:06 AM  
 
No results found for: INR, PTMR, PTP, PT1, PT2, APTT No results found for: IRON, FE, TIBC, IBCT, PSAT, FERR No results found for: PH, PCO2, PO2 No components found for: [place] No results found for: CPK, CKMB Total time: 35 
Counseling / coordination time, spent as noted above: 30 
> 50% counseling / coordination?: yes Prolonged service was provided for  []30 min   []75 min in face to face time in the presence of the patient, spent as noted above. Time Start:  
Time End:  
Note: this can only be billed with 70787 (initial) or 11699 (follow up). If multiple start / stop times, list each separately.

## 2019-04-19 NOTE — PROGRESS NOTES
7:30  Patient BP 89/66 and . Dr. Tony Bustillo notified. Ordered 500cc bolus. 7:45 Bolus infusing now. Will monitor patient and recheck BP once infusion is completed. 8:15 BP rechecked post bolus, found to be 84/58 and rechecked again with a result of 85/63. Dr. Tony Bustillo notified again. Dr. Tony Bustillo at bedside. Ordered another 500cc bolus. 9:25 BP now 125/71 post bolus. But heart rate elevated at 124. Patient is anxious and concerned about prognosis and Chest x Ray; plus is in pain from movement for chest x ray. Dr. Tony Bustillo informed of patient HR and better BP. 
 
9:30 Patient produced dark brown/red urine in urinal.  Dr. Tony Bustillo informed. Ordered UA. UA sent. 9:40 Pain medication administered. IV fluids now running at 100 per order. Waiting X Ray results for further instructions on patient diet status. Will continue to monitor patient. 10:15 Pulmonologist on the floor now and informed of patient pleurX cath drainage. Pulmonologist at bedside with patient now. 10:50 Wound care RN changed ileostomy bag. 
 
11:00 Patient hooked up to pleurX drainage system. No complaints of chest discomfort or SOB at this time. Dark Red fluid is  slowly moving into drainage bag. Will check on patient frequently. 11:20 PleurX continuing to drain slowly. Will continue to monitor. Still no patient complaints of pain, SOB, or discomfort. 11:40 Dr. Tony Bustillo notified about color of pleural drainage. Dr. Tony Bustillo suggested to monitor it and that this is not un-expected. PleurX is still currently draining. 12:20 PleurX no longer draining. A total of 680cc of dark red drainage was drained. PleurX re-capped and clamped. No complaints of pain, SOB, or discomfort at this time.

## 2019-04-19 NOTE — DISCHARGE SUMMARY
Physician Interim Summary Patient ID: 
Lindsey Michelle [id number] 
28 y.o. 
1970 PCP: Anjana Wang MD  
 
Consults: Pulmonary/Intensive care, GI and Palliative Care Covering dates: 4/12/2019 through 4/24/2019 Admission Diagnoses: Dyspnea [R06.00] Hospital Course through 4/18: Pt was admitted after he had rectal pain and dyspnea after colonoscopy and EGD. Dyspnea is due to large pleural effusion, s/p 2.1L thoracentesis on admission however fluid re accumulating and PleurX catheter was placed. Fluid is exudative and likely malignant per pulm.   
  
Rectal pain / ?anorectal carcinoma/ obstructing colon mass/ advanced carcinomatous disease: initail biopsy of anal mass was taken during colonoscopy showed benign colonic mucosa, but it is doubtful. During diverting ileostomy it was noted that diffuse carcinomatosis with bulky partially obstructing disease at hepatic flexure. Esophageal cancer/ Lung CA: s/p chemo. Now with malignant effusion Ileus/ early obstruction. NPO.   
  
Overall poor prognosis. Palliative care had a meeting with pt and family, but they couldn't make a decision. Appropriate for hospice. Hospital course through 4/24: 
Esophageal cancer / Esophageal stricture / PEG tube / Nausea / vomiting / abdominal pain - Esophageal stricture noted on EGD. Also pSBO vs Ileus noted on KUB. Tolerated diverting ileostomy 4/15. PEG now clamped off. Gen surgery following. Resumed some PO liquids now, and then will attempt PEG feedings, if ileus resolves. However, with carcinomatosis, there is some possibility that bowel function never recovers well, and if not, accelerated admission to hospice warranted. We will know in a few days. 
  
Pleural effusion / Lung CA / Dyspnea - Large malignant pleural effusion POA, s/p 2.1L thoracentesis, now S/p PleurX catheter. Pulm following, and now have ordered pleurex drainage every other day.   At this point his fluid loss exceeds intake, and he is becoming tachycardic and hypotensive. Will need IVF while here prn. This will be a major issue for hospice at home, and dehydration will be a chronic issue. 
  
Obstructing colon mass / Rectal pain / anorectal carcinoma / advanced carcinomatous disease / Ascites - Bx unremarkable, yet strong clinical Dx of cancer. Diffuse carcinomatosis with bulky partially obstructing disease at hepatic flexure noted perioperatively. Once stable PEG intake, of if we conclude that he cannot take any feedings at all, plan is to enroll in home hospice at home. 
  
Anemia - Due to cancer and poor nutrition. It worsened with hydration post op as expected 
  
CKD stage 4 - Renal US showed no evidence of obstruction. Cr stable. 
  
Hypotension / tachycardia - Continue IVF with bolus.  
  
Anxiety and depression - continue zoloft. 
  
 
Additional hospital course and discharge summary will be done by discharging physician.

## 2019-04-19 NOTE — PROGRESS NOTES
Today's Updates/Plan 1. Palliative care following. To follow-up with Pt on decision on whether to pursue comfort care. 2. CM will continue to follow-up with discharge plan. Discharge plan 1. TBD possibly home with comfort care. TONY Parsons, [facility]

## 2019-04-20 NOTE — PROGRESS NOTES
visit for routine follow up. Patient reclining in bed. Good eye contact, friendly, personable. Says he is feeling okay today. Wife is also at bedside. Provided spiritual presence and listening as he spoke of his present thoughts, feelings, and concerns. Says he is feeling good for the most part. Says he only experiences some pain around the site of his recent operation. He says the staff are aware and are providing for all of his needs. When asked if there was anything he desired, he said he would really like to be able to eat something. He remains positive and uses humor to cope. He requested prayer and a prayer was offered. Both he and his wife appeared comforted as a result of this prayer and visit and expressed gratitude for this prayer and visit. Visited by Rev. Jessica Duarte, [address] [facility]  paging service: [phone])

## 2019-04-20 NOTE — PROGRESS NOTES
Patient with tachycardia all day shift with periodic hypotensive episodes. 0034: BP is 86/57, Pulse is 115. Sodium chloride is infusing continuously at 100 ml/hr. 0050: Dr. Merlinda Mulder is notified and said BP be rechecked and if low,  500cc bolus be given. BP  rechecked is 93/53 but heart rate is still elevated at 115.  
0427:BP is 91/55 but heart rate as been consistently elevated at 115. Patient does not endorse pain and is resting comfortably. Will continue to monitor

## 2019-04-20 NOTE — PROGRESS NOTES
Bedside and Verbal shift change report given to [address] [place] [address] (oncoming nurse) by Fatou Mendosa (offgoing nurse). Report included the following information SBAR, Kardex, Procedure Summary, MAR, Recent Results and Med Rec Status.

## 2019-04-20 NOTE — PROGRESS NOTES
Assessment / Plan:  
Feeling better Post op ileus as expected NPO until ostomy productive Continue g tube to suction May have popsicles OOB to chair Pain better controlled Aster Noble MD 
 
 
General Surgery Daily Progress Note Patient: Maren Garcia MRN: [id number]  SSN: [id number] YOB: 1970  Age: 44 y.o. Sex: male Admit Date: 4/12/2019 Subjective:  
Still no bowel function, G tube to suction. Current Facility-Administered Medications Medication Dose Route Frequency  levoFLOXacin (LEVAQUIN) 750 mg in D5W IVPB  750 mg IntraVENous Q48H  
 aluminum-magnesium hydroxide (MAALOX) oral suspension 30 mL  30 mL Oral Q6H PRN  
 0.9% sodium chloride infusion  100 mL/hr IntraVENous CONTINUOUS  
 morphine injection 4 mg  4 mg IntraVENous Q3H PRN  
 lidocaine (PF) (XYLOCAINE) 10 mg/mL (1 %) injection 0.1 mL  0.1 mL SubCUTAneous PRN  
 levalbuterol (XOPENEX) nebulizer soln 1.25 mg/3 mL  1.25 mg Nebulization Q6H RT  
 sodium chloride (NS) flush 5-40 mL  5-40 mL IntraVENous Q8H  
 sodium chloride (NS) flush 5-40 mL  5-40 mL IntraVENous PRN  
 acetaminophen (TYLENOL) tablet 650 mg  650 mg Oral Q4H PRN  
 ondansetron (ZOFRAN) injection 4 mg  4 mg IntraVENous Q4H PRN  
 oxyCODONE (ROXICODONE INTENSOL) 20 mg/mL concentrated solution 5 mg  5 mg Oral Q4H PRN  
 sertraline (ZOLOFT) tablet 50 mg  50 mg Oral DAILY Objective: No intake/output data recorded. 04/18 1901 - 04/20 0700 In: -  
Out: 6352 [Urine:180; Drains:2280] Patient Vitals for the past 8 hrs: 
 BP Temp Pulse Resp SpO2  
04/20/19 0828     92 % 04/20/19 0827 104/67 97.7 °F (36.5 °C) 97 16 95 % 04/20/19 0427 91/55 97.8 °F (36.6 °C) (!) 115 16 92 % Physical Exam: 
General: NAD Abd: soft appropriately tender ileostomy congested and edematous no output, incision c/d/i Labs:  
Recent Labs  
  04/20/19 
8582 WBC 8.2 HGB 9.3* HCT 29.4*  
 Recent Labs  
  04/20/19 1902  
  
K 5.2*  
* CO2 23 * BUN 48* CREA 2.33* CA 9.1 Active Problems: Dyspnea (4/12/2019) Problem List Items Addressed This Visit Other Dyspnea Other Visit Diagnoses Cancer associated pain Rectal or anal pain Advanced care planning/counseling discussion Goals of care, counseling/discussion Carcinomatosis ([facility] [address] DNR (do not resuscitate) discussion

## 2019-04-20 NOTE — PROGRESS NOTES
Bedside shift change report given to Kimberley Liriano RN (oncoming nurse) by Maureen France RN (offgoing nurse). Report included the following information SBAR, MAR, Accordion and Recent Results.

## 2019-04-20 NOTE — PROGRESS NOTES
[facility] Medical Progress Note NAME: He Diamond :  1970 MRM:  [id number] Date/Time: 2019  8:14 AM    
 
  
Assessment and Plan:  
 
Esophageal cancer / Esophageal stricture / PEG tube / Nausea / vomiting / abdominal pain - Stricture noted on EGD.  pSBO vs Ileus noted on KUB. NPO. Now post diverting ileostomy. Gen surgery following. Resume PEG input once ileus resolves. Pleural effusion / Lung CA / Dyspnea - Large malignant pleural effusion POA, s/p 2.1L thoracentesis, now S/p PleurX catheter. Pulm following PRN. Will use on hospice at home.  
  
Cbstructing colon mass / Rectal pain / anorectal carcinoma / advanced carcinomatous disease / Ascites - Bx unremarkable, but no plan to repeat Bx based on strong clinical Dx of cancer. Diffuse carcinomatosis with bulky partially obstructing disease at hepatic flexure noted perioperatively. Once stable plan is to enroll in home hospice at home. 
  
Anemia - Due to cancer and poor nutrition. It worsened with hydration post op as expected CKD stage 4 - Renal US showed no evidence of obstruction. Cr stable. 
  
Hypotension / tachycardia - Continue IVF with bolus. Anxiety and depression - continue zoloft. Subjective: Chief Complaint:  Pain better. Tolerating sips. ROS: 
(bold if positive, if negative) Abd PainTolerating minimal PT  Tolerating sips Diet Objective:  
 
Last 24hrs VS reviewed since prior progress note. Most recent are: 
 
Visit Vitals BP 91/55 (BP 1 Location: Left arm, BP Patient Position: At rest) Pulse (!) 115 Temp 97.8 °F (36.6 °C) Resp 16 Ht 5' 9\" (1.753 m) Wt 65.3 kg (144 lb) SpO2 92% BMI 21.27 kg/m² SpO2 Readings from Last 6 Encounters:  
19 92% 14 99% 14 97% 14 97% 14 99% O2 Flow Rate (L/min): 1 l/min Intake/Output Summary (Last 24 hours) at 2019 9841 Last data filed at 2019 2043 Gross per 24 hour Intake  Output 1760 ml Net -1760 ml Physical Exam: 
 
Gen:  Frail, ill-appearing, in no acute distress HEENT:  Pink conjunctivae, PERRL, hearing intact to voice, moist mucous membranes Neck:  Supple, without masses, thyroid non-tender Resp:  No accessory muscle use, distant breath sounds without wheezes rales or rhonchi 
Card:  No murmurs, tachycardic S1, S2 without thrills, bruits or peripheral edema Abd:  Soft, non-tender, non-distended, normoactive bowel sounds are present, no mass, PEG, ileostomy Lymph:  No cervical or inguinal adenopathy Musc:  No cyanosis or clubbing Skin:  No rashes or ulcers, skin turgor is reduced Neuro:  Cranial nerves are grossly intact, general motor weakness, follows commands slowly Psych:  Good insight, oriented to person, place and time, alert Telemetry reviewed:   normal sinus rhythm 
__________________________________________________________________ Medications Reviewed: (see below) Medications:  
 
Current Facility-Administered Medications Medication Dose Route Frequency  levoFLOXacin (LEVAQUIN) 750 mg in D5W IVPB  750 mg IntraVENous Q48H  
 aluminum-magnesium hydroxide (MAALOX) oral suspension 30 mL  30 mL Oral Q6H PRN  
 0.9% sodium chloride infusion  100 mL/hr IntraVENous CONTINUOUS  
 morphine injection 4 mg  4 mg IntraVENous Q3H PRN  
 lidocaine (PF) (XYLOCAINE) 10 mg/mL (1 %) injection 0.1 mL  0.1 mL SubCUTAneous PRN  
 levalbuterol (XOPENEX) nebulizer soln 1.25 mg/3 mL  1.25 mg Nebulization Q6H RT  
 sodium chloride (NS) flush 5-40 mL  5-40 mL IntraVENous Q8H  
 sodium chloride (NS) flush 5-40 mL  5-40 mL IntraVENous PRN  
 acetaminophen (TYLENOL) tablet 650 mg  650 mg Oral Q4H PRN  
 ondansetron (ZOFRAN) injection 4 mg  4 mg IntraVENous Q4H PRN  
 oxyCODONE (ROXICODONE INTENSOL) 20 mg/mL concentrated solution 5 mg  5 mg Oral Q4H PRN  
 sertraline (ZOLOFT) tablet 50 mg  50 mg Oral DAILY Lab Data Reviewed: (see below) Lab Review:  
 
Recent Labs  
  04/20/19 
0312 04/19/19 
0138 04/18/19 
0502 WBC 8.2 11.5* 11.6* HGB 9.3* 10.9* 12.6 HCT 29.4* 34.4* 39.7  233 243 Recent Labs  
  04/20/19 
0312 04/19/19 
0138 04/18/19 
0502  143 139  
K 5.2* 5.3* 5.4*  
* 110* 110* CO2 23 27 23 * 152* 166* BUN 48* 37* 26* CREA 2.33* 2.44* 2.11* CA 9.1 9.0 9.9 Lab Results Component Value Date/Time Glucose (POC) 133 (H) 04/17/2019 12:25 PM  
 Glucose (POC) 130 (H) 04/12/2019 07:41 AM  
 Glucose (POC) 251 (H) 01/18/2014 02:22 PM  
 
No results for input(s): PH, PCO2, PO2, HCO3, FIO2 in the last 72 hours. No results for input(s): INR in the last 72 hours. No lab exists for component: INREXT All Micro Results Procedure Component Value Units Date/Time URINE CULTURE HOLD SAMPLE [[id number]] Collected:  04/19/19 0934 Order Status:  Completed Specimen:  Urine from Serum Updated:  04/19/19 1542 Urine culture hold URINE ON HOLD IN MICROBIOLOGY DEPT FOR 3 DAYS. IF UNPRESERVED URINE IS SUBMITTED, IT CANNOT BE USED FOR ADDITIONAL TESTING AFTER 24 HRS, RECOLLECTION WILL BE REQUIRED. CULTURE, BODY FLUID [[id number]] Collected:  04/12/19 1227 Order Status:  Completed Specimen:  Pleural Fluid Updated:  04/16/19 2772 Special Requests: NO SPECIAL REQUESTS     
  GRAM STAIN 3+ WBCS SEEN     
   NO ORGANISMS SEEN Culture result: NO GROWTH 4 DAYS Other pertinent lab: none Total time spent with patient: 39 Minutes I reviewed chart, notes, data and current medications in the medical record. I have examined and treated the patient at bedside during this period. Care Plan discussed with: Patient, Family, Nursing Staff and >50% of time spent in counseling and coordination of care Discussed:  Care Plan and D/C Planning Prophylaxis:  Lovenox Disposition:  Home w/Family ___________________________________________________ Attending Physician: Whitney Cruz MD

## 2019-04-21 NOTE — PROGRESS NOTES
Assessment / Plan:  
Sleeping Gastrostomy output downtrending OK to drink as tolerated Ostomy still congested Yvon Knox MD 
 
 
General Surgery Daily Progress Note Patient: Lindsey Michelle MRN: [id number]  SSN: [id number] YOB: 1970  Age: 44 y.o. Sex: male Admit Date: 4/12/2019 Subjective:  
No gas in bag yet, G tube to suction. Thirsty Current Facility-Administered Medications Medication Dose Route Frequency  dextrose 5 % - 0.45% NaCl infusion  100 mL/hr IntraVENous CONTINUOUS  
 levoFLOXacin (LEVAQUIN) 750 mg in D5W IVPB  750 mg IntraVENous Q48H  
 aluminum-magnesium hydroxide (MAALOX) oral suspension 30 mL  30 mL Oral Q6H PRN  
 morphine injection 4 mg  4 mg IntraVENous Q3H PRN  
 lidocaine (PF) (XYLOCAINE) 10 mg/mL (1 %) injection 0.1 mL  0.1 mL SubCUTAneous PRN  
 levalbuterol (XOPENEX) nebulizer soln 1.25 mg/3 mL  1.25 mg Nebulization Q6H RT  
 sodium chloride (NS) flush 5-40 mL  5-40 mL IntraVENous Q8H  
 sodium chloride (NS) flush 5-40 mL  5-40 mL IntraVENous PRN  
 acetaminophen (TYLENOL) tablet 650 mg  650 mg Oral Q4H PRN  
 ondansetron (ZOFRAN) injection 4 mg  4 mg IntraVENous Q4H PRN  
 oxyCODONE (ROXICODONE INTENSOL) 20 mg/mL concentrated solution 5 mg  5 mg Oral Q4H PRN  
 sertraline (ZOLOFT) tablet 50 mg  50 mg Oral DAILY Objective:  
04/21 0701 - 04/21 1900 In: -  
Out: 60  
04/19 1901 - 04/21 0700 In: -  
Out: 3777 [Urine:500; Drains:500] Patient Vitals for the past 8 hrs: 
 BP Temp Pulse Resp SpO2  
04/21/19 1459     93 % 04/21/19 1220 90/56 98.2 °F (36.8 °C) 97 17 92 % 04/21/19 0833 99/60 97.6 °F (36.4 °C) 95 17 93 % 04/21/19 0832     94 % Physical Exam: 
General: NAD Abd: soft appropriately tender ileostomy congested and edematous no output, incision c/d/i Labs:  
Recent Labs  
  04/20/19 
7480 WBC 8.2 HGB 9.3* HCT 29.4*  
 Recent Labs  
  04/20/19 
1839  K 5.2*  
* CO2 23 * BUN 48* CREA 2.33* CA 9.1 Active Problems: Dyspnea (4/12/2019) Problem List Items Addressed This Visit Other Dyspnea Other Visit Diagnoses Cancer associated pain Rectal or anal pain Advanced care planning/counseling discussion Goals of care, counseling/discussion Carcinomatosis ([facility] [address] DNR (do not resuscitate) discussion

## 2019-04-21 NOTE — PROGRESS NOTES
[facility] Medical Progress Note NAME: Carmen Bhakta :  1970 MRM:  [id number] Date/Time: 2019  9:41 AM    
 
  
Assessment and Plan:  
 
Esophageal cancer / Esophageal stricture / PEG tube / Nausea / vomiting / abdominal pain - Stricture noted on EGD.  pSBO vs Ileus noted on KUB. NPO. Now post diverting ileostomy. Bilious output from PEG still. Gen surgery following. Resume PEG feedings, input once ileus resolves. Pleural effusion / Lung CA / Dyspnea - Large malignant pleural effusion POA, s/p 2.1L thoracentesis, now S/p PleurX catheter. Pulm following PRN. Will use on hospice at home.  
  
Cbstructing colon mass / Rectal pain / anorectal carcinoma / advanced carcinomatous disease / Ascites - Bx unremarkable, but no plan to repeat Bx based on strong clinical Dx of cancer. Diffuse carcinomatosis with bulky partially obstructing disease at hepatic flexure noted perioperatively. Once stable plan is to enroll in home hospice at home. 
  
Anemia - Due to cancer and poor nutrition. It worsened with hydration post op as expected CKD stage 4 - Renal US showed no evidence of obstruction. Cr stable. 
  
Hypotension / tachycardia - Continue IVF with bolus. Anxiety and depression - continue zoloft. Subjective: Chief Complaint:  Pain stable. Tolerating sips. No change in 24hr ROS: 
(bold if positive, if negative) Abd PainTolerating minimal PT  Tolerating sips Diet Objective:  
 
Last 24hrs VS reviewed since prior progress note. Most recent are: 
 
Visit Vitals BP 99/60 (BP 1 Location: Left arm, BP Patient Position: At rest) Pulse 95 Temp 97.6 °F (36.4 °C) Resp 17 Ht 5' 9\" (1.753 m) Wt 65.3 kg (144 lb) SpO2 93% BMI 21.27 kg/m² SpO2 Readings from Last 6 Encounters:  
19 93% 14 99% 14 97% 14 97% 14 99% O2 Flow Rate (L/min): 2 l/min Intake/Output Summary (Last 24 hours) at 4/21/2019 5060 Last data filed at 4/21/2019 0045 Gross per 24 hour Intake  Output 1025 ml Net -1025 ml Physical Exam: 
 
Gen:  Frail, ill-appearing, in no acute distress HEENT:  Pink conjunctivae, PERRL, hearing intact to voice, moist mucous membranes Neck:  Supple, without masses, thyroid non-tender Resp:  No accessory muscle use, distant breath sounds without wheezes rales or rhonchi 
Card:  No murmurs, tachycardic S1, S2 without thrills, bruits or peripheral edema Abd:  Soft, non-tender, non-distended, normoactive bowel sounds are present, no mass, PEG, ileostomy Lymph:  No cervical or inguinal adenopathy Musc:  No cyanosis or clubbing Skin:  No rashes or ulcers, skin turgor is reduced Neuro:  Cranial nerves are grossly intact, general motor weakness, follows commands slowly Psych:  Good insight, oriented to person, place and time, alert Telemetry reviewed:   normal sinus rhythm 
__________________________________________________________________ Medications Reviewed: (see below) Medications:  
 
Current Facility-Administered Medications Medication Dose Route Frequency  dextrose 5 % - 0.45% NaCl infusion  100 mL/hr IntraVENous CONTINUOUS  
 levoFLOXacin (LEVAQUIN) 750 mg in D5W IVPB  750 mg IntraVENous Q48H  
 aluminum-magnesium hydroxide (MAALOX) oral suspension 30 mL  30 mL Oral Q6H PRN  
 morphine injection 4 mg  4 mg IntraVENous Q3H PRN  
 lidocaine (PF) (XYLOCAINE) 10 mg/mL (1 %) injection 0.1 mL  0.1 mL SubCUTAneous PRN  
 levalbuterol (XOPENEX) nebulizer soln 1.25 mg/3 mL  1.25 mg Nebulization Q6H RT  
 sodium chloride (NS) flush 5-40 mL  5-40 mL IntraVENous Q8H  
 sodium chloride (NS) flush 5-40 mL  5-40 mL IntraVENous PRN  
 acetaminophen (TYLENOL) tablet 650 mg  650 mg Oral Q4H PRN  
 ondansetron (ZOFRAN) injection 4 mg  4 mg IntraVENous Q4H PRN  
 oxyCODONE (ROXICODONE INTENSOL) 20 mg/mL concentrated solution 5 mg  5 mg Oral Q4H PRN  
 sertraline (ZOLOFT) tablet 50 mg  50 mg Oral DAILY Lab Data Reviewed: (see below) Lab Review:  
 
Recent Labs  
  04/20/19 
0312 04/19/19 
0138 WBC 8.2 11.5* HGB 9.3* 10.9* HCT 29.4* 34.4*  
 233 Recent Labs  
  04/20/19 
8277 04/19/19 
0138  143  
K 5.2* 5.3*  
* 110* CO2 23 27 * 152* BUN 48* 37* CREA 2.33* 2.44* CA 9.1 9.0 Lab Results Component Value Date/Time Glucose (POC) 133 (H) 04/17/2019 12:25 PM  
 Glucose (POC) 130 (H) 04/12/2019 07:41 AM  
 Glucose (POC) 251 (H) 01/18/2014 02:22 PM  
 
No results for input(s): PH, PCO2, PO2, HCO3, FIO2 in the last 72 hours. No results for input(s): INR in the last 72 hours. No lab exists for component: INREXT, INREXT All Micro Results Procedure Component Value Units Date/Time URINE CULTURE HOLD SAMPLE [[id number]] Collected:  04/19/19 0934 Order Status:  Completed Specimen:  Urine from Serum Updated:  04/19/19 5913 Urine culture hold URINE ON HOLD IN MICROBIOLOGY DEPT FOR 3 DAYS. IF UNPRESERVED URINE IS SUBMITTED, IT CANNOT BE USED FOR ADDITIONAL TESTING AFTER 24 HRS, RECOLLECTION WILL BE REQUIRED. CULTURE, BODY FLUID [[id number]] Collected:  04/12/19 1227 Order Status:  Completed Specimen:  Pleural Fluid Updated:  04/16/19 0698 Special Requests: NO SPECIAL REQUESTS     
  GRAM STAIN 3+ WBCS SEEN     
   NO ORGANISMS SEEN Culture result: NO GROWTH 4 DAYS Other pertinent lab: none Total time spent with patient: 39 Minutes I reviewed chart, notes, data and current medications in the medical record. I have examined and treated the patient at bedside during this period. Care Plan discussed with: Patient, Family, Nursing Staff and >50% of time spent in counseling and coordination of care Discussed:  Care Plan and D/C Planning Prophylaxis:  Lovenox Disposition:  Home w/Family ___________________________________________________ Attending Physician: Glo Joaquin MD

## 2019-04-21 NOTE — PROGRESS NOTES
Bedside shift change report given to Ermelinda Thayer RN (oncoming nurse) by Dalia Sever, RN (offgoing nurse). Report included the following information SBAR, MAR, Accordion and Recent Results.

## 2019-04-22 NOTE — PROGRESS NOTES
[facility] Medical Progress Note NAME: Maci Vera :  1970 MRM:  [id number] Date/Time: 2019  9:11 AM    
 
  
Assessment and Plan:  
 
Esophageal cancer / Esophageal stricture / PEG tube / Nausea / vomiting / abdominal pain - Stricture noted on EGD.  pSBO vs Ileus noted on KUB. NPO. Tolerated diverting ileostomy 4/15. Still with bilious output from PEG. Gen surgery following. Resume PEG feedings, input once ileus resolves. Pleural effusion / Lung CA / Dyspnea - Large malignant pleural effusion POA, s/p 2.1L thoracentesis, now S/p PleurX catheter. Pulm following, and will order pleurex drainage every other day. At this point his fluid loss exceeds intake, and he is becoming tachycardic and hypotensive. Will need IVF while here. Will use on hospice at home, and dehydration will be a chronic issue. 
  
Cbstructing colon mass / Rectal pain / anorectal carcinoma / advanced carcinomatous disease / Ascites - Bx unremarkable, yet strong clinical Dx of cancer. Diffuse carcinomatosis with bulky partially obstructing disease at hepatic flexure noted perioperatively. Once stable PEG intake, plan is to enroll in home hospice at home. 
  
Anemia - Due to cancer and poor nutrition. It worsened with hydration post op as expected CKD stage 4 - Renal US showed no evidence of obstruction. Cr stable. 
  
Hypotension / tachycardia - Continue IVF with bolus. Anxiety and depression - continue zoloft. Subjective: Chief Complaint:  Pain stable. Tolerating sips. No change in 24hr ROS: 
(bold if positive, if negative) Abd PainTolerating minimal PT  Tolerating sips Diet Objective:  
 
Last 24hrs VS reviewed since prior progress note. Most recent are: 
 
Visit Vitals BP 95/64 (BP 1 Location: Left arm, BP Patient Position: At rest) Pulse 99 Temp 97.5 °F (36.4 °C) Resp 18 Ht 5' 9\" (1.753 m) Wt 65.3 kg (144 lb) SpO2 95% BMI 21.27 kg/m² SpO2 Readings from Last 6 Encounters:  
04/22/19 95% 06/09/14 99% 04/09/14 97% 01/30/14 97% 01/18/14 99% O2 Flow Rate (L/min): 2 l/min Intake/Output Summary (Last 24 hours) at 4/22/2019 3968 Last data filed at 4/21/2019 1709 Gross per 24 hour Intake  Output 500 ml Net -500 ml Physical Exam: 
 
Gen:  Frail, ill-appearing, in no acute distress HEENT:  Pink conjunctivae, PERRL, hearing intact to voice, moist mucous membranes Neck:  Supple, without masses, thyroid non-tender Resp:  No accessory muscle use, distant breath sounds without wheezes rales or rhonchi 
Card:  No murmurs, tachycardic S1, S2 without thrills, bruits or peripheral edema Abd:  Soft, non-tender, non-distended, normoactive bowel sounds are present, no mass, PEG, ileostomy Lymph:  No cervical or inguinal adenopathy Musc:  No cyanosis or clubbing Skin:  No rashes or ulcers, skin turgor is reduced Neuro:  Cranial nerves are grossly intact, general motor weakness, follows commands slowly Psych:  Good insight, oriented to person, place and time, alert Telemetry reviewed:   normal sinus rhythm 
__________________________________________________________________ Medications Reviewed: (see below) Medications:  
 
Current Facility-Administered Medications Medication Dose Route Frequency  dextrose 5 % - 0.45% NaCl infusion  100 mL/hr IntraVENous CONTINUOUS  
 levoFLOXacin (LEVAQUIN) 750 mg in D5W IVPB  750 mg IntraVENous Q48H  
 aluminum-magnesium hydroxide (MAALOX) oral suspension 30 mL  30 mL Oral Q6H PRN  
 morphine injection 4 mg  4 mg IntraVENous Q3H PRN  
 lidocaine (PF) (XYLOCAINE) 10 mg/mL (1 %) injection 0.1 mL  0.1 mL SubCUTAneous PRN  
 levalbuterol (XOPENEX) nebulizer soln 1.25 mg/3 mL  1.25 mg Nebulization Q6H RT  
 sodium chloride (NS) flush 5-40 mL  5-40 mL IntraVENous Q8H  
 sodium chloride (NS) flush 5-40 mL  5-40 mL IntraVENous PRN  
  acetaminophen (TYLENOL) tablet 650 mg  650 mg Oral Q4H PRN  
 ondansetron (ZOFRAN) injection 4 mg  4 mg IntraVENous Q4H PRN  
 oxyCODONE (ROXICODONE INTENSOL) 20 mg/mL concentrated solution 5 mg  5 mg Oral Q4H PRN  
 sertraline (ZOLOFT) tablet 50 mg  50 mg Oral DAILY Lab Data Reviewed: (see below) Lab Review:  
 
Recent Labs  
  04/22/19 0427 04/20/19 
3594 WBC 8.0 8.2 HGB 10.2* 9.3* HCT 32.7* 29.4*  
 224 Recent Labs  
  04/22/19 
0427 04/20/19 
4714 * 144  
K 4.3 5.2*  
* 114* CO2 23 23 * 108* BUN 48* 48* CREA 1.91* 2.33* CA 8.7 9.1 MG 1.8  --   
 
Lab Results Component Value Date/Time Glucose (POC) 133 (H) 04/17/2019 12:25 PM  
 Glucose (POC) 130 (H) 04/12/2019 07:41 AM  
 Glucose (POC) 251 (H) 01/18/2014 02:22 PM  
 
No results for input(s): PH, PCO2, PO2, HCO3, FIO2 in the last 72 hours. No results for input(s): INR in the last 72 hours. No lab exists for component: INREXT, INREXT All Micro Results Procedure Component Value Units Date/Time URINE CULTURE HOLD SAMPLE [[id number]] Collected:  04/19/19 0934 Order Status:  Completed Specimen:  Urine from Serum Updated:  04/19/19 3230 Urine culture hold URINE ON HOLD IN MICROBIOLOGY DEPT FOR 3 DAYS. IF UNPRESERVED URINE IS SUBMITTED, IT CANNOT BE USED FOR ADDITIONAL TESTING AFTER 24 HRS, RECOLLECTION WILL BE REQUIRED. CULTURE, BODY FLUID [[id number]] Collected:  04/12/19 1227 Order Status:  Completed Specimen:  Pleural Fluid Updated:  04/16/19 0613 Special Requests: NO SPECIAL REQUESTS     
  GRAM STAIN 3+ WBCS SEEN     
   NO ORGANISMS SEEN Culture result: NO GROWTH 4 DAYS Other pertinent lab: none Total time spent with patient: 39 Minutes I reviewed chart, notes, data and current medications in the medical record. I have examined and treated the patient at bedside during this period. Care Plan discussed with: Patient, Family, Nursing Staff and >50% of time spent in counseling and coordination of care Discussed:  Care Plan and D/C Planning Prophylaxis:  Lovenox Disposition:  Home w/Family 
        
___________________________________________________ Attending Physician: Brigida Espinoza MD

## 2019-04-22 NOTE — PROGRESS NOTES
Today's Updates/Plan 1. Palliative care and hospice are following. Pt is receptive to comfort care once he discharges home. 2. CM will continue to follow-up with discharge plan. Discharge plan 1. home with comfort care.  
  
  
TONY Maurice, CM [facility]

## 2019-04-22 NOTE — PROGRESS NOTES
Palliative Medicine Consult Patient Name: Julienne Daugherty YOB: 1970 Date of Initial Consult: 4/12/2019 Reason for Consult: Dr. Marquis Delgado Requesting Provider: Overwhelming symptoms Primary Care Physician: Angelo Mandel MD 
  
 SUMMARY:  
Julienne Daugherty is a 44 y.o. with a past history of lung cancer, esophageal cancer hypertension, COPD, status post PEG, who was admitted on 4/12/2019 from home with a diagnosis of newly diagnosed likely anal or rectal CA. Current medical issues leading to Palliative Medicine involvement include: Overwhelming symptoms. History of present illness/past medical history patient is a 24-year-old gentleman with a history of esophageal cancer as well as lung cancer. He is followed Dr. Woodrow Petersen from [address] [place] [address]. He was supposed to see [facility] in approximately 2 weeks for follow-up on his esophageal cancer. He already has a PEG placed secondary to his esophageal cancer. He states he had been on some type of \"oral chemotherapy agent but this was causing significant side effects and so had recently been stopped. He is been having significant anal type pain and had seen Dr. Marquis Delgado as an outpatient. He presents today for attempts at a colonoscopy as well as repeat EGD. Unfortunately EGD showed what appears to be malignant narrowing at 37 to 40 cm. In addition, attempted a colonoscopy were not able to be completed secondary to significant needle stricture, sclerosis. Even a pediatric colonoscope could not pass the anal stenosis. Ultimately an upper GI scope was used to bypass into the rectal area which profuse stool was seen throughout. The anal canal is friable firm and was concerning for anal carcinoma. He was having significant pain and discomfort but that has seemed to improve by the time I have seen the patient. He is waiting Colorectal surgeon evaluation.   He admits that his bowel movements have been less over the last couple months and describes very small stools. His breathing has been worse over the last several weeks. He had used tramadol at home to help with his pain but that gave no relief. Social history he is  to Denis. His sister, Bhargav Land is also very involved and is in the room. PALLIATIVE DIAGNOSES:  
1. Cancer associated pain 2. Shortness of breathimproved after thoracentesis on the right with 2 L removed 3. 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 4. Esophageal cancer 5. Lung cancer 6. Advance care planning 7. DNR discussion PLAN:  
1. [facility], hospice nurse, and I met with patient and his wife. Reviewed the events from the weekend. Dr. Roxane Proctor was in the room as we entered. She was able to review the pathology and surgical findings with the patient again. According to his wife, he had forgotten some of the discussion from last week as he had been on morphine. We again discussed the advanced nature of the cancer and the limited options that we have in treating this. He now has 3 primary cancers to include lung, esophagus, and colon. He is still having issues with postop ileus although PEG output has been less. Dr. Roxane Proctor is going to attempt to advance his diet slightly today. He did decide over the weekend to use less morphine has been using oxycodone for pain. 2. Goals of care [facility] and I did review hospice and the benefits that it will provide when he is ready for discharge. Once again we are waiting for bowel function to return and certainly can arrange for any equipment and medications that he would need in the home. He still seems committed to going home with hospice care. 3. Advance care planning he has completed an advanced medical directive.   He has assigned his wife as primary medical power of  and his sister at [facility] power of . I have asked his family to bring a copy to the hospital. Sister will bring 4. CODE STATUS again addressed resuscitation with the patient. He still seems reluctant to change from full code to do not attempt resuscitation. We will continue to have ongoing discussions about the limitations of resuscitation in the situation. 5. Symptom management he has used morphine 4 mg IV x 4 doses and oxycodone 5 mg x 1 dose. He seems to committed to try to use the oxycodone at this point. 6. Discussed with Dr. Alyson tSephenson, bedside nurse 7. Initial consult note routed to primary continuity provider 8. Communicated plan of care with: Palliative IDT 
 
 
 GOALS OF CARE / TREATMENT PREFERENCES:  
[====Goals of Care====] GOALS OF CARE: 
Patient/Health Care Proxy Stated Goals: Prolong life TREATMENT PREFERENCES:  
Code Status: Full Code Advance Care Planning: 
Advance Care Planning 4/13/2019 Confirm Advance Directive Yes, not on file Medical Interventions: Full interventions Other Instructions:  
Artificially Administered Nutrition: Feeding tube long-term, if indicated The palliative care team has discussed with patient / health care proxy about goals of care / treatment preferences for patient. 
[====Goals of Care====] HISTORY:  
 
History obtained from: Chart, spouse, sister CHIEF COMPLAINT: Rectal pain HPI/SUBJECTIVE: The patient is:  
[x] Verbal and participatory [] Non-participatory due to:  
Patient is feeling much better. He has had a thoracentesis that removed 2 L of fluid. Feels like his breathing is much improved. 4/17 patient is just returned from the operating room and is still a little bit lethargic. 4/18-awake and alert. Still pain in the abdomen 4/19-pain is better. Lots of blood in the pleur-x drain-did contact pulmonary via bedside nurse 4/22-pain seems to be improved. Less discomfort over the Pleurx drain. Clinical Pain Assessment (nonverbal scale for severity on nonverbal patients):  
Clinical Pain Assessment Severity: 3 Location: Anal area Character: Throbbing and aching Duration: Months Effect: Difficult to have a bowel movement Factors: Constipation Frequency: Intermittent Adult Nonverbal Pain Scale Face: No particular expression or smile Activity (Movement): Lying quietly, normal position Guarding: Lying quietly, no positioning of hands over areas of body Physiology (Vital Signs): Stable vital signs Respiratory: Baseline RR/SpO2 compliant with ventilator Total Score: 0 
 
 
 FUNCTIONAL ASSESSMENT:  
 
Palliative Performance Scale (PPS): PPS: 70 PSYCHOSOCIAL/SPIRITUAL SCREENING:  
 
Advance Care Planning: 
Advance Care Planning 4/13/2019 Confirm Advance Directive Yes, not on file Any spiritual / Sikh concerns: 
[] Yes /  [x] No 
 
Caregiver Burnout: 
[] Yes /  [x] No /  [] No Caregiver Present Anticipatory grief assessment:  
[x] Normal  / [] Maladaptive ESAS Anxiety: Anxiety: 0 
 
ESAS Depression: Depression: 0 REVIEW OF SYSTEMS:  
 
Positive and pertinent negative findings in ROS are noted above in HPI. The following systems were [x] reviewed / [] unable to be reviewed as noted in HPI Other findings are noted below. Systems: constitutional, ears/nose/mouth/throat, respiratory, gastrointestinal, genitourinary, musculoskeletal, integumentary, neurologic, psychiatric, endocrine. Positive findings noted below. Modified ESAS Completed by: provider Fatigue: 2 Drowsiness: 0 Depression: 0 Pain: 3 Anxiety: 0 Nausea: 0 Anorexia: 3 Dyspnea: 4 Constipation: Yes Stool Occurrence(s): 1 PHYSICAL EXAM:  
 
From RN flowsheet: 
Wt Readings from Last 3 Encounters:  
04/12/19 144 lb (65.3 kg) 06/09/14 192 lb (87.1 kg) 04/09/14 193 lb 6.4 oz (87.7 kg) Blood pressure 97/68, pulse 100, temperature 97.6 °F (36.4 °C), resp.  rate 18, height 5' 9\" (1.753 m), weight 144 lb (65.3 kg), SpO2 98 %. Pain Scale 1: Numeric (0 - 10) Pain Intensity 1: 4 Pain Onset 1: chronid Pain Location 1: Abdomen Pain Orientation 1: Mid 
Pain Description 1: Aching Pain Intervention(s) 1: Medication (see MAR) Last bowel movement, if known:  
 
Constitutional: Thin, alert and oriented, sleepy Eyes: pupils equal, anicteric ENMT: no nasal discharge, moist mucous membranes Cardiovascular: regular rhythm, distal pulses intact Respiratory: breathing not labored, decreased on the right Gastrointestinal: soft, PEG in place, ileostomy noted, Mild diffuseTTP Musculoskeletal: no deformity, no tenderness to palpation Skin: warm, dry Neurologic: following commands, moving all extremities Psychiatric: full affect, no hallucinations Other: 
 
 
 HISTORY:  
 
Active Problems: Dyspnea (2019) Past Medical History:  
Diagnosis Date  Cancer [facility])   
 esophagus cancer, right lung cancer  Chronic obstructive pulmonary disease ([facility] [address] [facility]  Diabetes ([facility] [facility]  Hypertension  Sleep apnea   
 no cpap Past Surgical History:  
Procedure Laterality Date  COLONOSCOPY N/A 2019 COLONOSCOPY performed by Renu Grajeda MD at [facility] 58 HX ENDOSCOPY    
 w/ PEG tube  HX HEENT    
 14years old had eye surgery  IR INSERT CATH PLEURAL INDWELL  2019 Family History Problem Relation Age of Onset  Heart Disease Mother  Hypertension Mother  Heart Disease Brother  Heart Disease Maternal Grandmother  Heart Disease Paternal Grandmother History reviewed, no pertinent family history. Social History Tobacco Use  Smoking status: Former Smoker Last attempt to quit: 2018 Years since quittin.1  Smokeless tobacco: Never Used Substance Use Topics  Alcohol use: No  
 
Allergies Allergen Reactions  Codeine Other (comments)  
  hallucinations  Pcn [Penicillins] Hives States \"it's a childhood allergy\" Current Facility-Administered Medications Medication Dose Route Frequency  dextrose 5 % - 0.45% NaCl infusion  100 mL/hr IntraVENous CONTINUOUS  
 levoFLOXacin (LEVAQUIN) 750 mg in D5W IVPB  750 mg IntraVENous Q48H  
 aluminum-magnesium hydroxide (MAALOX) oral suspension 30 mL  30 mL Oral Q6H PRN  
 morphine injection 4 mg  4 mg IntraVENous Q3H PRN  
 lidocaine (PF) (XYLOCAINE) 10 mg/mL (1 %) injection 0.1 mL  0.1 mL SubCUTAneous PRN  
 levalbuterol (XOPENEX) nebulizer soln 1.25 mg/3 mL  1.25 mg Nebulization Q6H RT  
 sodium chloride (NS) flush 5-40 mL  5-40 mL IntraVENous Q8H  
 sodium chloride (NS) flush 5-40 mL  5-40 mL IntraVENous PRN  
 acetaminophen (TYLENOL) tablet 650 mg  650 mg Oral Q4H PRN  
 ondansetron (ZOFRAN) injection 4 mg  4 mg IntraVENous Q4H PRN  
 oxyCODONE (ROXICODONE INTENSOL) 20 mg/mL concentrated solution 5 mg  5 mg Oral Q4H PRN  
 sertraline (ZOLOFT) tablet 50 mg  50 mg Oral DAILY  
 
 
 
 LAB AND IMAGING FINDINGS:  
 
Lab Results Component Value Date/Time WBC 8.0 04/22/2019 04:27 AM  
 HGB 10.2 (L) 04/22/2019 04:27 AM  
 PLATELET 012 85/30/9496 04:27 AM  
 
Lab Results Component Value Date/Time Sodium 146 (H) 04/22/2019 04:27 AM  
 Potassium 4.3 04/22/2019 04:27 AM  
 Chloride 115 (H) 04/22/2019 04:27 AM  
 CO2 23 04/22/2019 04:27 AM  
 BUN 48 (H) 04/22/2019 04:27 AM  
 Creatinine 1.91 (H) 04/22/2019 04:27 AM  
 Calcium 8.7 04/22/2019 04:27 AM  
 Magnesium 1.8 04/22/2019 04:27 AM  
 Phosphorus 2.6 04/16/2019 03:43 AM  
  
Lab Results Component Value Date/Time AST (SGOT) 13 (L) 04/14/2019 02:06 AM  
 Alk. phosphatase 69 04/14/2019 02:06 AM  
 Protein, total 5.6 (L) 04/14/2019 02:06 AM  
 Albumin 2.3 (L) 04/16/2019 03:43 AM  
 Globulin 3.1 04/14/2019 02:06 AM  
 
No results found for: INR, PTMR, PTP, PT1, PT2, APTT No results found for: IRON, FE, TIBC, IBCT, PSAT, FERR No results found for: PH, PCO2, PO2 
 No components found for: [place] No results found for: CPK, CKMB Total time: 35 
Counseling / coordination time, spent as noted above: 30 
> 50% counseling / coordination?: yes Prolonged service was provided for  []30 min   []75 min in face to face time in the presence of the patient, spent as noted above. Time Start:  
Time End:  
Note: this can only be billed with 13709 (initial) or 14661 (follow up). If multiple start / stop times, list each separately.

## 2019-04-22 NOTE — PROGRESS NOTES
SURGERY PROGRESS NOTE Admit Date: 2019 POD 5 Days Post-Op Procedure: Procedure(s): LAPAROSCOPIC CONVERTED TO OPEN  DIVERTING ILEOSTOMY Subjective:  
Feels ok Minimal output from ostomy Objective:  
 
Visit Vitals BP 97/68 (BP 1 Location: Left arm, BP Patient Position: At rest) Pulse 100 Temp 97.6 °F (36.4 °C) Resp 18 Ht 5' 9\" (1.753 m) Wt 65.3 kg (144 lb) SpO2 98% BMI 21.27 kg/m² Temp (24hrs), Av.7 °F (36.5 °C), Min:97.5 °F (36.4 °C), Max:97.9 °F (36.6 °C) Physical Exam: Abdomen:  Soft. Non-tender, non-distended. Incision C/D/I. Ostomy with slough, patent Lab Results Component Value Date/Time WBC 8.0 2019 04:27 AM  
 HGB 10.2 (L) 2019 04:27 AM  
 Hemoglobin (POC) 17.0 2014 02:22 PM  
 HCT 32.7 (L) 2019 04:27 AM  
 Hematocrit (POC) 50 2014 02:22 PM  
 PLATELET 877  04:27 AM  
 MCV 94.5 2019 04:27 AM  
 
Lab Results Component Value Date/Time GFR est non-AA 37 (L) 2019 04:27 AM  
 GFRNA, POC 40 (L) 2018 04:15 PM  
 GFR est AA 45 (L) 2019 04:27 AM  
 GFRAA, POC 49 (L) 2018 04:15 PM  
 Creatinine 1.91 (H) 2019 04:27 AM  
 Creatinine (POC) 1.8 (H) 2018 04:15 PM  
 BUN 48 (H) 2019 04:27 AM  
 BUN (POC) 25 (H) 2014 02:22 PM  
 Sodium 146 (H) 2019 04:27 AM  
 Sodium (POC) 137 2014 02:22 PM  
 Potassium 4.3 2019 04:27 AM  
 Potassium (POC) 3.8 2014 02:22 PM  
 Chloride 115 (H) 2019 04:27 AM  
 Chloride (POC) 105 2014 02:22 PM  
 CO2 23 2019 04:27 AM  
 Magnesium 1.8 2019 04:27 AM  
 Phosphorus 2.6 2019 03:43 AM  
 
 
Assessment:  
 
Active Problems: Dyspnea (2019) Plan/Recommendations/Medical Decision Making:  
Awaiting ostomy function - digitized today patent not tight Cap PEG Clears as tolerates Needs to be OOB and ambulate

## 2019-04-22 NOTE — HOSPICE
[facility] Good Help to Those in Need 
[phone] Patient Name: Elmira Phillips YOB: 1970 Age: 44 y.o. [address] [facility] RN Note:   
 
Met again with patient, wife, and Dr. Eri Rivers. Dr. Cristiane Sawyer, wife, was speaking with patient regarding everything that happened with surgery and the pathology. She also checked the patency of his ileostomy to try to enlarge stoma to get fluids flowing. Dr. Eri Rivers gave long explanation of palliative and hospice care again. Patient seemed on the fence, stating he would like to [facility] Dr. Eri Rivers explained that oncology has said there is no treatment available. Patient's wife seemed to understand and agree. We talked about setting up hospice services when patient is ready for discharge, which may not be for a week. Patient and wife did not agree nor disagree, but when the time comes we will move forward with discharge home with hospice when the family is prepared. Thank you for the opportunity to be of service to this patient.

## 2019-04-22 NOTE — PROGRESS NOTES
Name: Herminia Holley Dr: [id number]  
: 1970 Admit Date: 2019 Phone:   Room: 535/01 PCP: Yue Patel MD  MRN: [id number] Date: 2019  Code: Full Code Chart and notes reviewed. Data reviewed. I review the patient's current medications in the medical record at each encounter. I have evaluated and examined the patient. History of Present Illness: 
Mr. Ivette Hampton is a 43 yo gentleman with a history esophageal cancer, lung cancer, LIZETH (does not wear CPAP), COPD, and new suspected anal/rectal cancer. Found to have a large right sided pleural effusion. Underwent colonoscopy and EGD today. Had his PEG replaced and found to have significant anal/rectal stenosis, suspicious for malignancy. He had a chest x-ray for shortness of breath and found to have a right sided effusion. He tells me has been short of breath for quite some time and attributed it to his COPD. No cough, chest pain, f/c. 
 
Labs reviewed: no new labs today other than a glucose of 130 I have personally reviewed chest CT from [facility] in 2019. Pt with mod R effusion at that time and mass like lesion in RLL. PET scan in Feb with increased uptake in RLL w mod effusion. New R paracolic soft tissue mass w increased uptake Interval Hx: Afebrile overnight BP stable 500mLs removed from PleurX yesterday and on  ROS Feels well today. SOB improving. No cough/sputum. Has some drainage around dressing but much less than earlier last week. CXR  personally reviewed: Interval placement of right chest tube with significant decrease in effusion. Past Medical History:  
Diagnosis Date  Cancer [facility])   
 esophagus cancer, right lung cancer  Chronic obstructive pulmonary disease ([facility] [address]  Diabetes ([facility]  Hypertension  Sleep apnea   
 no cpap Past Surgical History:  
Procedure Laterality Date  COLONOSCOPY N/A 2019 COLONOSCOPY performed by Rony Shea MD at [facility] 58 HX ENDOSCOPY    
 w/ PEG tube  HX HEENT    
 14years old had eye surgery  IR INSERT CATH PLEURAL INDWELL  2019 Family History Problem Relation Age of Onset  Heart Disease Mother  Hypertension Mother  Heart Disease Brother  Heart Disease Maternal Grandmother  Heart Disease Paternal Grandmother Social History Tobacco Use  Smoking status: Former Smoker Last attempt to quit: 2018 Years since quittin.1  Smokeless tobacco: Never Used Substance Use Topics  Alcohol use: No  
 
 
Allergies Allergen Reactions  Codeine Other (comments)  
  hallucinations  Pcn [Penicillins] Hives States \"it's a childhood allergy\" Current Facility-Administered Medications Medication Dose Route Frequency  dextrose 5 % - 0.45% NaCl infusion  100 mL/hr IntraVENous CONTINUOUS  
 levoFLOXacin (LEVAQUIN) 750 mg in D5W IVPB  750 mg IntraVENous Q48H  
 aluminum-magnesium hydroxide (MAALOX) oral suspension 30 mL  30 mL Oral Q6H PRN  
 morphine injection 4 mg  4 mg IntraVENous Q3H PRN  
 lidocaine (PF) (XYLOCAINE) 10 mg/mL (1 %) injection 0.1 mL  0.1 mL SubCUTAneous PRN  
 levalbuterol (XOPENEX) nebulizer soln 1.25 mg/3 mL  1.25 mg Nebulization Q6H RT  
 sodium chloride (NS) flush 5-40 mL  5-40 mL IntraVENous Q8H  
 sodium chloride (NS) flush 5-40 mL  5-40 mL IntraVENous PRN  
 acetaminophen (TYLENOL) tablet 650 mg  650 mg Oral Q4H PRN  
 ondansetron (ZOFRAN) injection 4 mg  4 mg IntraVENous Q4H PRN  
 oxyCODONE (ROXICODONE INTENSOL) 20 mg/mL concentrated solution 5 mg  5 mg Oral Q4H PRN  
 sertraline (ZOLOFT) tablet 50 mg  50 mg Oral DAILY REVIEW OF SYSTEMS  
12 point ROS negative except as stated in the HPI. Physical Exam:  
Visit Vitals BP 95/64 (BP 1 Location: Left arm, BP Patient Position: At rest) Pulse 99 Temp 97.5 °F (36.4 °C) Resp 18 Ht 5' 9\" (1.753 m) Wt 65.3 kg (144 lb) SpO2 95% BMI 21.27 kg/m² General:  Alert, cooperative, ill appearing, no acute distress, appears stated age. Head:  Normocephalic, without obvious abnormality, atraumatic. Eyes:  Conjunctivae/corneas clear. Nose: Nares normal. Septum midline. Mucosa normal.   
Throat: Lips, mucosa, and tongue normal.   
Neck: Supple, symmetrical, trachea midline, no adenopathy. Lungs:   R ant chest with air entry but diminished in lower right 2/3 lung field. R pleurx dressing: clean, dry and intact. Left sounds clear. Chest wall:  No tenderness or deformity. Heart:  Regular rate and rhythm, S1, S2 normal, no murmur, click, rub or gallop. Abdomen:   Soft, mildly tender. PEG in place. Ostomy in place with brown stool. Extremities: Extremities normal, atraumatic, no cyanosis or edema. Pulses: 2+ and symmetric all extremities. Skin: Skin color, texture, turgor normal. No rashes or lesions Lymph nodes: Cervical, supraclavicular nodes normal.  
Neurologic: Grossly nonfocal  
 
 
Lab Results Component Value Date/Time Sodium 146 (H) 04/22/2019 04:27 AM  
 Potassium 4.3 04/22/2019 04:27 AM  
 Chloride 115 (H) 04/22/2019 04:27 AM  
 CO2 23 04/22/2019 04:27 AM  
 BUN 48 (H) 04/22/2019 04:27 AM  
 Creatinine 1.91 (H) 04/22/2019 04:27 AM  
 Glucose 145 (H) 04/22/2019 04:27 AM  
 Calcium 8.7 04/22/2019 04:27 AM  
 Magnesium 1.8 04/22/2019 04:27 AM  
 Phosphorus 2.6 04/16/2019 03:43 AM  
 
 
Lab Results Component Value Date/Time WBC 8.0 04/22/2019 04:27 AM  
 HGB 10.2 (L) 04/22/2019 04:27 AM  
 PLATELET 104 31/12/2748 04:27 AM  
 MCV 94.5 04/22/2019 04:27 AM  
 
 
Lab Results Component Value Date/Time AST (SGOT) 13 (L) 04/14/2019 02:06 AM  
 Alk.  phosphatase 69 04/14/2019 02:06 AM  
 Protein, total 5.6 (L) 04/14/2019 02:06 AM  
 Albumin 2.3 (L) 04/16/2019 03:43 AM  
 Globulin 3.1 04/14/2019 02:06 AM  
 
 
 No results found for: IRON, FE, TIBC, IBCT, PSAT, FERR No results found for: SR, CRP, TALA, ANAIGG, RA, RPR, RPRT, VDRLT, VDRLS, TSH, TSHEXT, TSHEXT No results found for: PH, PHI, PCO2, PCO2I, PO2, PO2I, HCO3, HCO3I, FIO2, FIO2I No results found for: CPK, RCK1, RCK2, RCK3, RCK4, CKNDX, CKND1, TROPT, TROIQ, BNPP, BNP Lab Results Component Value Date/Time Culture result: NO GROWTH 4 DAYS 04/12/2019 12:27 PM  
 
 
No results found for: TOXA1, RPR, HBCM, HBSAG, HAAB, HCAB1, HAAT, G6PD, CRYAC, HIVGT, HIVR, HIV1, HIV12, HIVPC, HIVRPI No results found for: VANCT, CPK Lab Results Component Value Date/Time Color RED 04/19/2019 09:34 AM  
 Appearance BLOODY (A) 04/19/2019 09:34 AM  
 pH (UA) 5.5 04/19/2019 09:34 AM  
 Protein 300 (A) 04/19/2019 09:34 AM  
 Glucose NEGATIVE  04/19/2019 09:34 AM  
 Ketone 15 (A) 04/19/2019 09:34 AM  
 Blood LARGE (A) 04/19/2019 09:34 AM  
 Urobilinogen 1.0 04/19/2019 09:34 AM  
 Nitrites POSITIVE (A) 04/19/2019 09:34 AM  
 Leukocyte Esterase MODERATE (A) 04/19/2019 09:34 AM  
 WBC 10-20 04/19/2019 09:34 AM  
 RBC >100 (H) 04/19/2019 09:34 AM  
 Bacteria 3+ (A) 04/19/2019 09:34 AM  
 
 
IMPRESSION 
· Malignant pleural effusion: adenocarcinoma. S/p Pleurx drain 4/16. Given size of effusion, not suprising to have some drainage around tube. Still has small amount of drainage but much improved. · Esophageal cancer · Lung cancer · Suspected anal/rectal cancer · COPD · Sleep apnea (does not wear CPAP) PLAN 
· Goal sats >90% · Decrease draining PleurX to every other day · Cont with xopenex nebs · Pain control per Palliative Care · Patient and family considering hospice. No further work up planned per GI. Steffi Hsakins, LILIANA

## 2019-04-22 NOTE — WOUND CARE
Wound care follow up for ostomy assessment, family at bedside. Appliance intact- stoma more red and moist with slough, small amount of tan to serous drainage in appliance. Will plan to change appliance with patient on 4/23. Patient and spouse advised of plan.   
 
Derrick Mendez RN [facility]

## 2019-04-22 NOTE — PROGRESS NOTES
Bedside shift change report given to Ezra Nielsen (oncoming nurse) by Dalia Sever, RN (offgoing nurse). Report included the following information SBAR, MAR, Accordion and Recent Results.

## 2019-04-23 NOTE — PROGRESS NOTES
Today's Updates/Plan 
  
1. Palliative care and hospice are following. Pt is receptive to comfort care once he discharges home. 
  
  
2. CM will continue to follow-up with discharge plan. 
  
  
Discharge plan 
  
1. home with comfort care.  
  
  
TONY Maurice, CM [address] [place] [address]

## 2019-04-23 NOTE — PROGRESS NOTES
Bedside and Verbal shift change report given to Lauren (oncoming nurse) by Gilbert Bedoya (offgoing nurse). Report included the following information SBAR, Kardex and Recent Results.

## 2019-04-23 NOTE — PROGRESS NOTES
[facility] Medical Progress Note NAME: Rachle Smith :  1970 MRM:  [id number] Date/Time: 2019  12:42 PM   
 
  
Assessment and Plan:  
 
Esophageal cancer / Esophageal stricture / PEG tube / Nausea / vomiting / abdominal pain - Stricture noted on EGD.  pSBO vs Ileus noted on KUB. Tolerated diverting ileostomy 4/15. Less bilious output from PEG, so clamped off yesterday. Gen surgery following. Resume some PO liquids now, and then PEG feedings, input once ileus resolves. Pleural effusion / Lung CA / Dyspnea - Large malignant pleural effusion POA, s/p 2.1L thoracentesis, now S/p PleurX catheter. Pulm following, and will order pleurex drainage every other day. At this point his fluid loss exceeds intake, and he is becoming tachycardic and hypotensive. Will need IVF while here. Will use on hospice at home, and dehydration will be a chronic issue. 
  
Obstructing colon mass / Rectal pain / anorectal carcinoma / advanced carcinomatous disease / Ascites - Bx unremarkable, yet strong clinical Dx of cancer. Diffuse carcinomatosis with bulky partially obstructing disease at hepatic flexure noted perioperatively. Once stable PEG intake, plan is to enroll in home hospice at home. 
  
Anemia - Due to cancer and poor nutrition. It worsened with hydration post op as expected CKD stage 4 - Renal US showed no evidence of obstruction. Cr stable. 
  
Hypotension / tachycardia - Continue IVF with bolus. Anxiety and depression - continue zoloft. Subjective: Chief Complaint:  Pain stable. Tolerating sips. Overall a bit better ROS: 
(bold if positive, if negative) Abd PainTolerating minimal PT  Tolerating sips Diet Objective:  
 
Last 24hrs VS reviewed since prior progress note. Most recent are: 
 
Visit Vitals /74 (BP 1 Location: Right arm, BP Patient Position: At rest) Pulse (!) 113 Temp 97.6 °F (36.4 °C) Resp 16 Ht 5' 9\" (1.753 m) Wt 65.3 kg (144 lb) SpO2 96% BMI 21.27 kg/m² SpO2 Readings from Last 6 Encounters:  
04/23/19 96% 06/09/14 99% 04/09/14 97% 01/30/14 97% 01/18/14 99% O2 Flow Rate (L/min): 2 l/min Intake/Output Summary (Last 24 hours) at 4/23/2019 1242 Last data filed at 4/23/2019 1893 Gross per 24 hour Intake 3390 ml Output 580 ml Net 2810 ml Physical Exam: 
 
Gen:  Frail, ill-appearing, in no acute distress HEENT:  Pink conjunctivae, PERRL, hearing intact to voice, moist mucous membranes Neck:  Supple, without masses, thyroid non-tender Resp:  No accessory muscle use, distant breath sounds without wheezes rales or rhonchi 
Card:  No murmurs, tachycardic S1, S2 without thrills, bruits or peripheral edema Abd:  Soft, non-tender, non-distended, normoactive bowel sounds are present, no mass, PEG, ileostomy Lymph:  No cervical or inguinal adenopathy Musc:  No cyanosis or clubbing Skin:  No rashes or ulcers, skin turgor is reduced Neuro:  Cranial nerves are grossly intact, general motor weakness, follows commands slowly Psych:  Good insight, oriented to person, place and time, alert Telemetry reviewed:   normal sinus rhythm 
__________________________________________________________________ Medications Reviewed: (see below) Medications:  
 
Current Facility-Administered Medications Medication Dose Route Frequency  dextrose 5 % - 0.45% NaCl infusion  100 mL/hr IntraVENous CONTINUOUS  
 levoFLOXacin (LEVAQUIN) 750 mg in D5W IVPB  750 mg IntraVENous Q48H  
 aluminum-magnesium hydroxide (MAALOX) oral suspension 30 mL  30 mL Oral Q6H PRN  
 morphine injection 4 mg  4 mg IntraVENous Q3H PRN  
 lidocaine (PF) (XYLOCAINE) 10 mg/mL (1 %) injection 0.1 mL  0.1 mL SubCUTAneous PRN  
 levalbuterol (XOPENEX) nebulizer soln 1.25 mg/3 mL  1.25 mg Nebulization Q6H RT  
 sodium chloride (NS) flush 5-40 mL  5-40 mL IntraVENous Q8H  
  sodium chloride (NS) flush 5-40 mL  5-40 mL IntraVENous PRN  
 acetaminophen (TYLENOL) tablet 650 mg  650 mg Oral Q4H PRN  
 ondansetron (ZOFRAN) injection 4 mg  4 mg IntraVENous Q4H PRN  
 oxyCODONE (ROXICODONE INTENSOL) 20 mg/mL concentrated solution 5 mg  5 mg Oral Q4H PRN  
 sertraline (ZOLOFT) tablet 50 mg  50 mg Oral DAILY Lab Data Reviewed: (see below) Lab Review:  
 
Recent Labs  
  04/23/19 0540 04/22/19 0427 WBC 10.9 8.0 HGB 10.9* 10.2* HCT 34.3* 32.7*  
 274 Recent Labs  
  04/23/19 0540 04/22/19 0427  146*  
K 4.2 4.3 * 115* CO2 23 23 * 145* BUN 48* 48* CREA 2.01* 1.91* CA 8.7 8.7 MG 1.8 1.8 PHOS 3.0  --   
ALB 1.9*  --   
TBILI 0.3  --   
SGOT 12*  --   
ALT 8*  --   
 
Lab Results Component Value Date/Time Glucose (POC) 133 (H) 04/17/2019 12:25 PM  
 Glucose (POC) 130 (H) 04/12/2019 07:41 AM  
 Glucose (POC) 251 (H) 01/18/2014 02:22 PM  
 
No results for input(s): PH, PCO2, PO2, HCO3, FIO2 in the last 72 hours. No results for input(s): INR in the last 72 hours. No lab exists for component: INREXT, INREXT All Micro Results Procedure Component Value Units Date/Time URINE CULTURE HOLD SAMPLE [[id number]] Collected:  04/19/19 0939 Order Status:  Completed Specimen:  Urine from Serum Updated:  04/19/19 0348 Urine culture hold URINE ON HOLD IN MICROBIOLOGY DEPT FOR 3 DAYS. IF UNPRESERVED URINE IS SUBMITTED, IT CANNOT BE USED FOR ADDITIONAL TESTING AFTER 24 HRS, RECOLLECTION WILL BE REQUIRED. CULTURE, BODY FLUID [[id number]] Collected:  04/12/19 2124 Order Status:  Completed Specimen:  Pleural Fluid Updated:  04/16/19 2057 Special Requests: NO SPECIAL REQUESTS     
  GRAM STAIN 3+ WBCS SEEN     
   NO ORGANISMS SEEN Culture result: NO GROWTH 4 DAYS Other pertinent lab: none Total time spent with patient: 39 Minutes I reviewed chart, notes, data and current medications in the medical record. I have examined and treated the patient at bedside during this period. Care Plan discussed with: Patient, Family, Nursing Staff and >50% of time spent in counseling and coordination of care Discussed:  Care Plan and D/C Planning Prophylaxis:  Lovenox Disposition:  Home w/Family 
        
___________________________________________________ Attending Physician: Beth Simpson MD

## 2019-04-23 NOTE — PROGRESS NOTES
Orders received. Spoke to nursing and to patient. Patient declined PT at this time due to feeling groggy from medication. States he has a walker at home from the last admission and knows how to use it. Nursing reports patient is very weak and not getting OOB at this time and will go home on a stretcher. He has 3 primary cancers with poor prognosis and is going to transition to Hospice care. PT left rolling walker for nursing to use if needed. Nurse Lauren aware and in agreement. PT will complete order at this time.

## 2019-04-23 NOTE — PROGRESS NOTES
Nutrition Assessment: 
 
RECOMMENDATIONS/INTERVENTION(S):  
When bowel function returns- PEG feeding: 
Jevity 1.5 @ 55 goal rate w  mL Q4H would meet nutritional needs Pt currently on Clear liquid diet to encourage bowel function. Likely needs Full liquids now that suction off. Monitor weight, GI status- ileostomy- no BM yet Check diet advancement ASSESSMENT:  
4/23: Awaiting return of bowel function to advance diet per MD. PEG disconnected from suction. Pt drinking some liquids. Monitor diet advancement, POC, likely home with hospice. Encourage PO as tolerated. See TF recs above if needed. 4/18: Pt on suction from PEG- 700 mL + this morning per RN. Nauseated and not eating anything at this time. Palliative to meet later today. Monitor POC. Continue Regular diet or per pt preferences. No BM via ileostomy yet. Will continue to follow as needed. Consult if TF orders or other ONS needed. 4/17: Pt in OR to have diverting ostomy placed. Recommend GI lite after diet advanced if pt can tolerate PO sufficiently. If pt cannot tolerate enough PO to maintain weight, use PEG for TF. Type and volume recs above. Recommend starting PO or TF as soon as possible to include protein for wound healing after open abdominal surgery. Labs reviewed. BG elevated- 177 mg/d this morning, likely stress/anxiety related to surgery today. IVF D5 1/2 NS @ 75 mL/hr - provides 306 kcal of dextrose. 4/15: 44 yr old male admitted after attempted EGD and colonoscopy. PMHx: esophageal cancer as well as lung cancer, COPD, PEG, DM, HTN. MST for weight loss and PEG. Pt states he hasn't used the PEG in a \"long time\" and eats PO normally. Pt states appetite is fair and is kind of hungry at the moment 2/2 tray being taken away before he got to finish it. Recommended ONS, pt declined at this time. Doesn't drink any protein drinks at home. Pt claims 20 lbs (12%) weight loss over last 2 months.  Significant for time frame. No weight hx in chart to verify. Pt denies n/v currently. States was nauseated earlier. Pt discussed in IDR rounds- pt may need plurex and maybe diversion of GI tract 2/2 mass found. Awaiting path from colonoscopy and POC. Pt currently on GI lite diet. Encourage PO. , 141, 116 mg/dL. A1C 6.5. Phos 2.5. Consult if TF is needed or diet ed for ostomy if placed. SUBJECTIVE/OBJECTIVE:  
Diet Order: Regular 
% Eaten:  No data found. Pertinent Medications: [x] Reviewed Labs reviewed:  [x] Anthropometrics: Height: 5' 9\" (175.3 cm) Weight: 65.3 kg (144 lb) IBW (%IBW):   ( ) UBW (%UBW):   (  %) BMI: Body mass index is 21.27 kg/m². This BMI is indicative of: 
 
 [] Underweight    [x] Normal    [] Overweight    []  Obesity    []  Extreme Obesity (BMI>40) Estimated Nutrition Needs (Based on): 1948 Kcals/day(BMR(1499x1.3)) , 65 g(-78g/day(1.0-1.2g/kg)) Protein Carbohydrate: At Least 130 g/day  Fluids: 1950 mL/day (1mL/kg rounded to 50 mL) Last BM: 4/19-bloody watery   [x]Active     []Hyperactive  []Hypoactive       [] Absent   BS Skin:    [] Intact   [x] Incision- abdomen  [] Breakdown   [] DTI   [] Tears/Excoriation/Abrasion  []Edema [] Other: Wt Readings from Last 30 Encounters:  
04/12/19 65.3 kg (144 lb) 06/09/14 87.1 kg (192 lb) 04/09/14 87.7 kg (193 lb 6.4 oz) 03/06/14 87.8 kg (193 lb 9.6 oz) 02/10/14 86.5 kg (190 lb 9.6 oz) 01/30/14 87.1 kg (192 lb)  
01/18/14 83.9 kg (185 lb) NUTRITION DIAGNOSES:  
Problem:  Altered GI function Etiology: related to alteration in GI structure/fucntion Signs/Symptoms: as evidenced by PEG, esophageal cancer, possible rectal cancer- awaiting path NUTRITION INTERVENTIONS: 
Meals/Snacks: General/healthful diet Enteral/Parenteral Nutrition: Initiate enteral nutrition Supplements: Commercial supplement GOAL:  
Pt will consume >50% of meals and have BM within 3-5 days Cultural, Episcopal, or Ethnic Dietary Needs: None LEARNING NEEDS (Diet, Food/Nutrient-Drug Interaction):  
 [x] None Identified 
 [] Identified and Education Provided/Documented 
 [] Identified and Pt declined/was not appropriate [x] Interdisciplinary Care Plan Reviewed/Documented  
 [x] Discharge Needs: TF vs PO - ostomy diet, high protein [] No Nutrition Related Discharge Needs NUTRITION RISK:  
Pt Is At Nutrition Risk  [x] No Nutrition Risk Identified  [] PT SEEN FOR:  
 []  MD Consult: []Calorie Count []Diabetic Diet Education []Diet Education []Electrolyte Management []General Nutrition Management and Supplements []Management of Tube Feeding []TPN Recommendations []  RN Referral:  []MST score >=2 
   []Enteral/Parenteral Nutrition PTA []Pregnant: Gestational DM or Multigestation  
              [] Pressure Ulcer 
   
[]  Low BMI      []  Length of Stay       [] Dysphagia Diet     [] Ventilator      [x] Follow-Up Previous Recommendations: 
 [x] Implemented          [] Not Implemented          [] Not Applicable Previous Goal: 
 [x] Met              [] Progressing Towards Goal              [] Not Progressing Towards Goal   [] Not Applicable Gaby Skaggs RD Pager: [phone] Office: [phone]

## 2019-04-23 NOTE — PROGRESS NOTES
SURGERY PROGRESS NOTE Admit Date: 2019 POD 6 Days Post-Op Procedure: Procedure(s): LAPAROSCOPIC CONVERTED TO OPEN  DIVERTING ILEOSTOMY Subjective:  
Feels ok still with nausea Objective:  
 
Visit Vitals BP 99/69 (BP 1 Location: Left arm, BP Patient Position: At rest) Pulse (!) 117 Temp 98 °F (36.7 °C) Resp 16 Ht 5' 9\" (1.753 m) Wt 65.3 kg (144 lb) SpO2 93% BMI 21.27 kg/m² Temp (24hrs), Av.7 °F (36.5 °C), Min:97.3 °F (36.3 °C), Max:98 °F (36.7 °C) Physical Exam: Abdomen:  Soft. Appropriately tender, non-distended. Incision C/D/I. Ostomy without output Lab Results Component Value Date/Time WBC 10.9 2019 05:40 AM  
 HGB 10.9 (L) 2019 05:40 AM  
 Hemoglobin (POC) 17.0 2014 02:22 PM  
 HCT 34.3 (L) 2019 05:40 AM  
 Hematocrit (POC) 50 2014 02:22 PM  
 PLATELET 559  05:40 AM  
 MCV 91.7 2019 05:40 AM  
 
Lab Results Component Value Date/Time GFR est non-AA 35 (L) 2019 05:40 AM  
 GFRNA, POC 40 (L) 2018 04:15 PM  
 GFR est AA 43 (L) 2019 05:40 AM  
 GFRAA, POC 49 (L) 2018 04:15 PM  
 Creatinine 2.01 (H) 2019 05:40 AM  
 Creatinine (POC) 1.8 (H) 2018 04:15 PM  
 BUN 48 (H) 2019 05:40 AM  
 BUN (POC) 25 (H) 2014 02:22 PM  
 Sodium 143 2019 05:40 AM  
 Sodium (POC) 137 2014 02:22 PM  
 Potassium 4.2 2019 05:40 AM  
 Potassium (POC) 3.8 2014 02:22 PM  
 Chloride 113 (H) 2019 05:40 AM  
 Chloride (POC) 105 2014 02:22 PM  
 CO2 23 2019 05:40 AM  
 Magnesium 1.8 2019 05:40 AM  
 Phosphorus 3.0 2019 05:40 AM  
 
 
Assessment:  
 
Active Problems: Dyspnea (2019) Plan/Recommendations/Medical Decision Making: Will get SBFT in am to eval ileus Clears as tolerates OOB to chair Ambulate with PT Urology to eval hematuria

## 2019-04-23 NOTE — PROGRESS NOTES
Name: Herminia Holley Dr: [id number]  
: 1970 Admit Date: 2019 Phone:   Room: 535/01 PCP: Jennie Lawson MD  MRN: [id number] Date: 2019  Code: Full Code Chart and notes reviewed. Data reviewed. I review the patient's current medications in the medical record at each encounter. I have evaluated and examined the patient. History of Present Illness: 
Mr. Zara Parisi is a 43 yo gentleman with a history esophageal cancer, lung cancer, LIZETH (does not wear CPAP), COPD, and new suspected anal/rectal cancer. Found to have a large right sided pleural effusion. Underwent colonoscopy and EGD today. Had his PEG replaced and found to have significant anal/rectal stenosis, suspicious for malignancy. He had a chest x-ray for shortness of breath and found to have a right sided effusion. He tells me has been short of breath for quite some time and attributed it to his COPD. No cough, chest pain, f/c. 
 
Labs reviewed: no new labs today other than a glucose of 130 I have personally reviewed chest CT from [facility] in 2019. Pt with mod R effusion at that time and mass like lesion in RLL. PET scan in Feb with increased uptake in RLL w mod effusion. New R paracolic soft tissue mass w increased uptake Interval Hx: 
 
Pt has had pleurx drained already this am.  Removed 400 cc. Pt denies dyspnea. Past Medical History:  
Diagnosis Date  Cancer [facility])   
 esophagus cancer, right lung cancer  Chronic obstructive pulmonary disease ([facility] [address]  Diabetes ([facility]  Hypertension  Sleep apnea   
 no cpap Past Surgical History:  
Procedure Laterality Date  COLONOSCOPY N/A 2019 COLONOSCOPY performed by Darnell Sandifer, MD at [address] [facility] HX ENDOSCOPY    
 w/ PEG tube  HX HEENT    
 14years old had eye surgery  IR INSERT CATH PLEURAL INDWELL  2019 Family History Problem Relation Age of Onset  Heart Disease Mother  Hypertension Mother  Heart Disease Brother  Heart Disease Maternal Grandmother  Heart Disease Paternal Grandmother Social History Tobacco Use  Smoking status: Former Smoker Last attempt to quit: 2018 Years since quittin.1  Smokeless tobacco: Never Used Substance Use Topics  Alcohol use: No  
 
 
Allergies Allergen Reactions  Codeine Other (comments)  
  hallucinations  Pcn [Penicillins] Hives States \"it's a childhood allergy\" Current Facility-Administered Medications Medication Dose Route Frequency  dextrose 5 % - 0.45% NaCl infusion  100 mL/hr IntraVENous CONTINUOUS  
 levoFLOXacin (LEVAQUIN) 750 mg in D5W IVPB  750 mg IntraVENous Q48H  
 aluminum-magnesium hydroxide (MAALOX) oral suspension 30 mL  30 mL Oral Q6H PRN  
 morphine injection 4 mg  4 mg IntraVENous Q3H PRN  
 lidocaine (PF) (XYLOCAINE) 10 mg/mL (1 %) injection 0.1 mL  0.1 mL SubCUTAneous PRN  
 levalbuterol (XOPENEX) nebulizer soln 1.25 mg/3 mL  1.25 mg Nebulization Q6H RT  
 sodium chloride (NS) flush 5-40 mL  5-40 mL IntraVENous Q8H  
 sodium chloride (NS) flush 5-40 mL  5-40 mL IntraVENous PRN  
 acetaminophen (TYLENOL) tablet 650 mg  650 mg Oral Q4H PRN  
 ondansetron (ZOFRAN) injection 4 mg  4 mg IntraVENous Q4H PRN  
 oxyCODONE (ROXICODONE INTENSOL) 20 mg/mL concentrated solution 5 mg  5 mg Oral Q4H PRN  
 sertraline (ZOLOFT) tablet 50 mg  50 mg Oral DAILY REVIEW OF SYSTEMS  
12 point ROS negative except as stated in the HPI. Physical Exam:  
Visit Vitals /74 (BP 1 Location: Right arm, BP Patient Position: At rest) Pulse (!) 113 Temp 97.6 °F (36.4 °C) Resp 16 Ht 5' 9\" (1.753 m) Wt 65.3 kg (144 lb) SpO2 96% BMI 21.27 kg/m² General:  Alert, cooperative, ill appearing, no acute distress, appears stated age. Head:  Normocephalic, without obvious abnormality, atraumatic. Eyes:  Conjunctivae/corneas clear. Nose: Nares normal. Septum midline. Mucosa normal.   
Throat: Lips, mucosa, and tongue normal.   
Neck: Supple, symmetrical, trachea midline, no adenopathy. Lungs:   R ant chest with air entry but diminished in lower right 2/3 lung field. R pleurx dressing: clean, dry and intact. Left sounds clear. Chest wall:  No tenderness or deformity. Heart:  Regular rate and rhythm, S1, S2 normal, no murmur, click, rub or gallop. Abdomen:   Soft, mildly tender. PEG in place. Ostomy in place with brown stool. Extremities: Extremities normal, atraumatic, no cyanosis or edema. Pulses: 2+ and symmetric all extremities. Skin: Skin color, texture, turgor normal. No rashes or lesions Lymph nodes: Cervical, supraclavicular nodes normal.  
Neurologic: Grossly nonfocal  
 
 
Lab Results Component Value Date/Time Sodium 143 04/23/2019 05:40 AM  
 Potassium 4.2 04/23/2019 05:40 AM  
 Chloride 113 (H) 04/23/2019 05:40 AM  
 CO2 23 04/23/2019 05:40 AM  
 BUN 48 (H) 04/23/2019 05:40 AM  
 Creatinine 2.01 (H) 04/23/2019 05:40 AM  
 Glucose 187 (H) 04/23/2019 05:40 AM  
 Calcium 8.7 04/23/2019 05:40 AM  
 Magnesium 1.8 04/23/2019 05:40 AM  
 Phosphorus 3.0 04/23/2019 05:40 AM  
 
 
Lab Results Component Value Date/Time WBC 10.9 04/23/2019 05:40 AM  
 HGB 10.9 (L) 04/23/2019 05:40 AM  
 PLATELET 120 93/17/9608 05:40 AM  
 MCV 91.7 04/23/2019 05:40 AM  
 
 
Lab Results Component Value Date/Time AST (SGOT) 12 (L) 04/23/2019 05:40 AM  
 Alk. phosphatase 56 04/23/2019 05:40 AM  
 Protein, total 4.8 (L) 04/23/2019 05:40 AM  
 Albumin 1.9 (L) 04/23/2019 05:40 AM  
 Globulin 2.9 04/23/2019 05:40 AM  
 
 
No results found for: IRON, FE, TIBC, IBCT, PSAT, FERR No results found for: SR, CRP, TALA, ANAIGG, RA, RPR, RPRT, VDRLT, VDRLS, TSH, TSHEXT, TSHEXT No results found for: PH, PHI, PCO2, PCO2I, PO2, PO2I, HCO3, HCO3I, FIO2, FIO2I No results found for: CPK, RCK1, RCK2, RCK3, RCK4, CKNDX, CKND1, TROPT, TROIQ, BNPP, BNP Lab Results Component Value Date/Time Culture result: NO GROWTH 4 DAYS 04/12/2019 12:27 PM  
 
 
No results found for: TOXA1, RPR, HBCM, HBSAG, HAAB, HCAB1, HAAT, G6PD, CRYAC, HIVGT, HIVR, HIV1, HIV12, HIVPC, HIVRPI No results found for: VANCT, CPK Lab Results Component Value Date/Time Color RED 04/19/2019 09:34 AM  
 Appearance BLOODY (A) 04/19/2019 09:34 AM  
 pH (UA) 5.5 04/19/2019 09:34 AM  
 Protein 300 (A) 04/19/2019 09:34 AM  
 Glucose NEGATIVE  04/19/2019 09:34 AM  
 Ketone 15 (A) 04/19/2019 09:34 AM  
 Blood LARGE (A) 04/19/2019 09:34 AM  
 Urobilinogen 1.0 04/19/2019 09:34 AM  
 Nitrites POSITIVE (A) 04/19/2019 09:34 AM  
 Leukocyte Esterase MODERATE (A) 04/19/2019 09:34 AM  
 WBC 10-20 04/19/2019 09:34 AM  
 RBC >100 (H) 04/19/2019 09:34 AM  
 Bacteria 3+ (A) 04/19/2019 09:34 AM  
 
 
IMPRESSION 
· Malignant pleural effusion: adenocarcinoma. S/p Pleurx drain 4/16.  400-500 cc being drained. Will try decreasing drainage to [facility]. · Esophageal cancer · Lung cancer · Suspected anal/rectal cancer · COPD · Sleep apnea (does not wear CPAP) PLAN 
· Goal sats >90% · Decrease draining PleurX to every other day · Cont with xopenex nebs · Pain control per Palliative Care · Patient and family considering hospice.   
 
 
 
 
 
Yohannes Kemp MD

## 2019-04-23 NOTE — ROUTINE PROCESS
Bedside and Verbal shift change report given to Beth Zuniga (oncoming nurse) by Gene Armando (offgoing nurse). Report included the following information SBAR, Kardex, Intake/Output, MAR and Recent Results.

## 2019-04-23 NOTE — PROGRESS NOTES
Palliative Medicine Consult Patient Name: João Calvert YOB: 1970 Date of Initial Consult: 4/12/2019 Reason for Consult: Dr. Zaki Block Requesting Provider: Overwhelming symptoms Primary Care Physician: Ruddy Casey MD 
  
 SUMMARY:  
João Calvert is a 44 y.o. with a past history of lung cancer, esophageal cancer hypertension, COPD, status post PEG, who was admitted on 4/12/2019 from home with a diagnosis of newly diagnosed likely anal or rectal CA. Current medical issues leading to Palliative Medicine involvement include: Overwhelming symptoms. History of present illness/past medical history patient is a 66-year-old gentleman with a history of esophageal cancer as well as lung cancer. He is followed Dr. Michelle Medeiros from [facility]. He was supposed to see [facility] in approximately 2 weeks for follow-up on his esophageal cancer. He already has a PEG placed secondary to his esophageal cancer. He states he had been on some type of \"oral chemotherapy agent but this was causing significant side effects and so had recently been stopped. He is been having significant anal type pain and had seen Dr. Zaki Block as an outpatient. He presents today for attempts at a colonoscopy as well as repeat EGD. Unfortunately EGD showed what appears to be malignant narrowing at 37 to 40 cm. In addition, attempted a colonoscopy were not able to be completed secondary to significant needle stricture, sclerosis. Even a pediatric colonoscope could not pass the anal stenosis. Ultimately an upper GI scope was used to bypass into the rectal area which profuse stool was seen throughout. The anal canal is friable firm and was concerning for anal carcinoma. He was having significant pain and discomfort but that has seemed to improve by the time I have seen the patient. He is waiting Colorectal surgeon evaluation.   He admits that his bowel movements have been less over the last couple months and describes very small stools. His breathing has been worse over the last several weeks. He had used tramadol at home to help with his pain but that gave no relief. Social history he is  to Denis. His sister, Yaquelin Brown is also very involved and is in the room. PALLIATIVE DIAGNOSES:  
1. Cancer associated pain 2. Shortness of breathimproved after thoracentesis on the right with 2 L removed 3. 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 4. Esophageal cancer 5. Lung cancer 6. Advance care planning 7. DNR discussion PLAN:  
1. Met with patient. He is feeling a little bit better but still some nausea. He has taken in some clear liquids and feels like the ostomy bag has shown small amounts of stool. Surgical note states considering a small bowel follow-through tomorrow. Pain has been controlled on current regiment of oxycodone and morphine. 2. Goals of care plan remains on transition to home hospice once he is stable medically/surgically. 3. Advance care planning he has completed an advanced medical directive. He has assigned his wife as primary medical power of  and his sister at secondary medical power of . I have asked his family to bring a copy to the hospital. Sister will bring 4. CODE STATUS did not readdress CODE STATUS today. This will be an ongoing discussion with the patient. 5. Symptom management he has used oxycodone 5 mg x 4 doses in the last 24 hours. He feels like this is been effective for his pain but not making him too sleepy. 6. Discussed with, bedside nurse 7. Initial consult note routed to primary continuity provider 8. Communicated plan of care with: Palliative IDT 
 
 
 GOALS OF CARE / TREATMENT PREFERENCES:  
[====Goals of Care====] GOALS OF CARE: 
 Patient/Health Care Proxy Stated Goals: Prolong life TREATMENT PREFERENCES:  
Code Status: Full Code Advance Care Planning: 
Advance Care Planning 4/13/2019 Confirm Advance Directive Yes, not on file Medical Interventions: Full interventions Other Instructions:  
Artificially Administered Nutrition: Feeding tube long-term, if indicated The palliative care team has discussed with patient / health care proxy about goals of care / treatment preferences for patient. 
[====Goals of Care====] HISTORY:  
 
History obtained from: Chart, spouse, sister CHIEF COMPLAINT: Rectal pain HPI/SUBJECTIVE: The patient is:  
[x] Verbal and participatory [] Non-participatory due to:  
Patient is feeling much better. He has had a thoracentesis that removed 2 L of fluid. Feels like his breathing is much improved. 4/17 patient is just returned from the operating room and is still a little bit lethargic. 4/18-awake and alert. Still pain in the abdomen 4/19-pain is better. Lots of blood in the pleur-x drain-did contact pulmonary via bedside nurse 4/22-pain seems to be improved. Less discomfort over the Pleurx drain. 4/23 feels like he is tolerating small amounts of liquids. Pain seems less. Clinical Pain Assessment (nonverbal scale for severity on nonverbal patients):  
Clinical Pain Assessment Severity: 3 Location: Anal area Character: Throbbing and aching Duration: Months Effect: Difficult to have a bowel movement Factors: Constipation Frequency: Intermittent Adult Nonverbal Pain Scale Face: No particular expression or smile Activity (Movement): Lying quietly, normal position Guarding: Lying quietly, no positioning of hands over areas of body Physiology (Vital Signs): Stable vital signs Respiratory: Baseline RR/SpO2 compliant with ventilator Total Score: 0 
 
 
 FUNCTIONAL ASSESSMENT:  
 
Palliative Performance Scale (PPS): PPS: 70 
 
 
 PSYCHOSOCIAL/SPIRITUAL SCREENING:  
 
Advance Care Planning: 
Advance Care Planning 4/13/2019 Confirm Advance Directive Yes, not on file Any spiritual / Rastafarian concerns: 
[] Yes /  [x] No 
 
Caregiver Burnout: 
[] Yes /  [x] No /  [] No Caregiver Present Anticipatory grief assessment:  
[x] Normal  / [] Maladaptive ESAS Anxiety: Anxiety: 0 
 
ESAS Depression: Depression: 0 REVIEW OF SYSTEMS:  
 
Positive and pertinent negative findings in ROS are noted above in HPI. The following systems were [x] reviewed / [] unable to be reviewed as noted in HPI Other findings are noted below. Systems: constitutional, ears/nose/mouth/throat, respiratory, gastrointestinal, genitourinary, musculoskeletal, integumentary, neurologic, psychiatric, endocrine. Positive findings noted below. Modified ESAS Completed by: provider Fatigue: 2 Drowsiness: 0 Depression: 0 Pain: 3 Anxiety: 0 Nausea: 0 Anorexia: 3 Dyspnea: 4 Constipation: Yes Stool Occurrence(s): 1 PHYSICAL EXAM:  
 
From RN flowsheet: 
Wt Readings from Last 3 Encounters:  
04/12/19 144 lb (65.3 kg) 06/09/14 192 lb (87.1 kg) 04/09/14 193 lb 6.4 oz (87.7 kg) Blood pressure 99/69, pulse (!) 117, temperature 98 °F (36.7 °C), resp. rate 16, height 5' 9\" (1.753 m), weight 144 lb (65.3 kg), SpO2 93 %. Pain Scale 1: Numeric (0 - 10) Pain Intensity 1: 5 Pain Onset 1: ongoing Pain Location 1: Abdomen Pain Orientation 1: Right, Lower Pain Description 1: Aching Pain Intervention(s) 1: Medication (see MAR), Emotional support Last bowel movement, if known:  
 
Constitutional: Thin, alert and oriented, Eyes: pupils equal, anicteric ENMT: no nasal discharge, moist mucous membranes Cardiovascular: regular rhythm, distal pulses intact Respiratory: breathing not labored, decreased on the right Gastrointestinal: soft, PEG in place, ileostomy noted, Mild diffuseTTP Musculoskeletal: no deformity, no tenderness to palpation Skin: warm, dry Neurologic: following commands, moving all extremities Psychiatric: full affect, no hallucinations Other: 
 
 
 HISTORY:  
 
Active Problems: Dyspnea (2019) Past Medical History:  
Diagnosis Date  Cancer [facility])   
 esophagus cancer, right lung cancer  Chronic obstructive pulmonary disease ([facility] [address]  Diabetes ([facility] [address]  Hypertension  Sleep apnea   
 no cpap Past Surgical History:  
Procedure Laterality Date  COLONOSCOPY N/A 2019 COLONOSCOPY performed by Rony Shea MD at [facility] 56 HX ENDOSCOPY    
 w/ PEG tube  HX HEENT    
 14years old had eye surgery  IR INSERT CATH PLEURAL INDWELL  2019 Family History Problem Relation Age of Onset  Heart Disease Mother  Hypertension Mother  Heart Disease Brother  Heart Disease Maternal Grandmother  Heart Disease Paternal Grandmother History reviewed, no pertinent family history. Social History Tobacco Use  Smoking status: Former Smoker Last attempt to quit: 2018 Years since quittin.1  Smokeless tobacco: Never Used Substance Use Topics  Alcohol use: No  
 
Allergies Allergen Reactions  Codeine Other (comments)  
  hallucinations  Pcn [Penicillins] Hives States \"it's a childhood allergy\" Current Facility-Administered Medications Medication Dose Route Frequency  dextrose 5 % - 0.45% NaCl infusion  75 mL/hr IntraVENous CONTINUOUS  
 aluminum-magnesium hydroxide (MAALOX) oral suspension 30 mL  30 mL Oral Q6H PRN  
 morphine injection 4 mg  4 mg IntraVENous Q3H PRN  
 lidocaine (PF) (XYLOCAINE) 10 mg/mL (1 %) injection 0.1 mL  0.1 mL SubCUTAneous PRN  
 levalbuterol (XOPENEX) nebulizer soln 1.25 mg/3 mL  1.25 mg Nebulization Q6H RT  
 sodium chloride (NS) flush 5-40 mL  5-40 mL IntraVENous Q8H  
  sodium chloride (NS) flush 5-40 mL  5-40 mL IntraVENous PRN  
 acetaminophen (TYLENOL) tablet 650 mg  650 mg Oral Q4H PRN  
 ondansetron (ZOFRAN) injection 4 mg  4 mg IntraVENous Q4H PRN  
 oxyCODONE (ROXICODONE INTENSOL) 20 mg/mL concentrated solution 5 mg  5 mg Oral Q4H PRN  
 sertraline (ZOLOFT) tablet 50 mg  50 mg Oral DAILY  
 
 
 
 LAB AND IMAGING FINDINGS:  
 
Lab Results Component Value Date/Time WBC 10.9 04/23/2019 05:40 AM  
 HGB 10.9 (L) 04/23/2019 05:40 AM  
 PLATELET 037 92/23/9690 05:40 AM  
 
Lab Results Component Value Date/Time Sodium 143 04/23/2019 05:40 AM  
 Potassium 4.2 04/23/2019 05:40 AM  
 Chloride 113 (H) 04/23/2019 05:40 AM  
 CO2 23 04/23/2019 05:40 AM  
 BUN 48 (H) 04/23/2019 05:40 AM  
 Creatinine 2.01 (H) 04/23/2019 05:40 AM  
 Calcium 8.7 04/23/2019 05:40 AM  
 Magnesium 1.8 04/23/2019 05:40 AM  
 Phosphorus 3.0 04/23/2019 05:40 AM  
  
Lab Results Component Value Date/Time AST (SGOT) 12 (L) 04/23/2019 05:40 AM  
 Alk. phosphatase 56 04/23/2019 05:40 AM  
 Protein, total 4.8 (L) 04/23/2019 05:40 AM  
 Albumin 1.9 (L) 04/23/2019 05:40 AM  
 Globulin 2.9 04/23/2019 05:40 AM  
 
No results found for: INR, PTMR, PTP, PT1, PT2, APTT No results found for: IRON, FE, TIBC, IBCT, PSAT, FERR No results found for: PH, PCO2, PO2 No components found for: [place] No results found for: CPK, CKMB Total time: 35 
Counseling / coordination time, spent as noted above: 30 
> 50% counseling / coordination?: yes Prolonged service was provided for  []30 min   []75 min in face to face time in the presence of the patient, spent as noted above. Time Start:  
Time End:  
Note: this can only be billed with 79184 (initial) or 77095 (follow up). If multiple start / stop times, list each separately.

## 2019-04-24 NOTE — ROUTINE PROCESS
Bedside and Verbal shift change report given to Jacek Juarez 1313 (oncoming nurse) by Harold Epley (offgoing nurse). Report included the following information SBAR, Kardex, ED Summary, Intake/Output, MAR and Recent Results.

## 2019-04-24 NOTE — PROGRESS NOTES
Palliative Medicine Consult Patient Name: Shayy Maria YOB: 1970 Date of Initial Consult: 4/12/2019 Reason for Consult: Dr. Juany Navarrete Requesting Provider: Overwhelming symptoms Primary Care Physician: Josh Henry MD 
  
 SUMMARY:  
Shayy Maria is a 44 y.o. with a past history of lung cancer, esophageal cancer hypertension, COPD, status post PEG, who was admitted on 4/12/2019 from home with a diagnosis of newly diagnosed likely anal or rectal CA. Current medical issues leading to Palliative Medicine involvement include: Overwhelming symptoms. History of present illness/past medical history patient is a 75-year-old gentleman with a history of esophageal cancer as well as lung cancer. He is followed Dr. Ankush Nguyen from [address] [place] [address]. He was supposed to see [facility] in approximately 2 weeks for follow-up on his esophageal cancer. He already has a PEG placed secondary to his esophageal cancer. He states he had been on some type of \"oral chemotherapy agent but this was causing significant side effects and so had recently been stopped. He is been having significant anal type pain and had seen Dr. Juany Navarrete as an outpatient. He presents today for attempts at a colonoscopy as well as repeat EGD. Unfortunately EGD showed what appears to be malignant narrowing at 37 to 40 cm. In addition, attempted a colonoscopy were not able to be completed secondary to significant needle stricture, sclerosis. Even a pediatric colonoscope could not pass the anal stenosis. Ultimately an upper GI scope was used to bypass into the rectal area which profuse stool was seen throughout. The anal canal is friable firm and was concerning for anal carcinoma. He was having significant pain and discomfort but that has seemed to improve by the time I have seen the patient. He is waiting Colorectal surgeon evaluation.   He admits that his bowel movements have been less over the last couple months and describes very small stools. His breathing has been worse over the last several weeks. He had used tramadol at home to help with his pain but that gave no relief. Social history he is  to Denis. His sister, Adolph Martines is also very involved and is in the room. PALLIATIVE DIAGNOSES:  
1. Cancer associated pain 2. Shortness of breathimproved after thoracentesis on the right with 2 L removed 3. 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 4. Esophageal cancer 5. Lung cancer 6. Advance care planning 7. DNR discussion PLAN:  
1. Attempted to meet with patient but he was sleeping. Chart reviewed and he has had a small bowel follow-through done today. Results still pending. I do have some concern about the extended ileus. Does appear that he has had multiple doses of antiemetics and several doses of opioids. 2. Goals of care plan remains on transition to home hospice once he is stable medically/surgically. 3. Advance care planning he has completed an advanced medical directive. He has assigned his wife as primary medical power of  and his sister at secondary medical power of . I have asked his family to bring a copy to the hospital. Sister will bring 4. CODE STATUS did not readdress CODE STATUS today as patient was sleeping. Obviously our team will readdress this issue before discharge. 5. Symptom management he has used oxycodone 5 mg x 4 doses in the last 24 hours. In addition he has used Zofran 4 mg IV x4 doses and morphine 4 mg IV x1 dose. Given the bowel issues, will add Compazine 5 mg every 6 hours on a as needed basis.   If bowel function return continues to be an issue, consider adding Decadron IV which sometimes can help with symptoms of pain when bowel obstruction is involved. We will continue to monitor closely. 6. Discussed with, bedside nurse 7. Initial consult note routed to primary continuity provider 8. Communicated plan of care with: Palliative IDT 
 
 
 GOALS OF CARE / TREATMENT PREFERENCES:  
[====Goals of Care====] GOALS OF CARE: 
Patient/Health Care Proxy Stated Goals: Prolong life TREATMENT PREFERENCES:  
Code Status: Full Code Advance Care Planning: 
Advance Care Planning 4/13/2019 Confirm Advance Directive Yes, not on file Medical Interventions: Full interventions Other Instructions:  
Artificially Administered Nutrition: Feeding tube long-term, if indicated The palliative care team has discussed with patient / health care proxy about goals of care / treatment preferences for patient. 
[====Goals of Care====] HISTORY:  
 
History obtained from: Chart, spouse, sister CHIEF COMPLAINT: Rectal pain HPI/SUBJECTIVE: The patient is:  
[x] Verbal and participatory [] Non-participatory due to:  
Patient is feeling much better. He has had a thoracentesis that removed 2 L of fluid. Feels like his breathing is much improved. 4/17 patient is just returned from the operating room and is still a little bit lethargic. 4/18-awake and alert. Still pain in the abdomen 4/19-pain is better. Lots of blood in the pleur-x drain-did contact pulmonary via bedside nurse 4/22-pain seems to be improved. Less discomfort over the Pleurx drain. 4/23 feels like he is tolerating small amounts of liquids. Pain seems less. 4/24 patient sleeping. Had a small bowel follow-through earlier today. Did not feel like we needed to wake him up. Clinical Pain Assessment (nonverbal scale for severity on nonverbal patients):  
Clinical Pain Assessment Severity: 3 Location: Anal area Character: Throbbing and aching Duration: Months Effect: Difficult to have a bowel movement Factors: Constipation Frequency: Intermittent Adult Nonverbal Pain Scale Face: No particular expression or smile Activity (Movement): Lying quietly, normal position Guarding: Lying quietly, no positioning of hands over areas of body Physiology (Vital Signs): Stable vital signs Respiratory: Baseline RR/SpO2 compliant with ventilator Total Score: 0 
 
 
 FUNCTIONAL ASSESSMENT:  
 
Palliative Performance Scale (PPS): PPS: 70 PSYCHOSOCIAL/SPIRITUAL SCREENING:  
 
Advance Care Planning: 
Advance Care Planning 4/13/2019 Confirm Advance Directive Yes, not on file Any spiritual / Synagogue concerns: 
[] Yes /  [x] No 
 
Caregiver Burnout: 
[] Yes /  [x] No /  [] No Caregiver Present Anticipatory grief assessment:  
[x] Normal  / [] Maladaptive ESAS Anxiety: Anxiety: 0 
 
ESAS Depression: Depression: 0 REVIEW OF SYSTEMS:  
 
Positive and pertinent negative findings in ROS are noted above in HPI. The following systems were [x] reviewed / [] unable to be reviewed as noted in HPI Other findings are noted below. Systems: constitutional, ears/nose/mouth/throat, respiratory, gastrointestinal, genitourinary, musculoskeletal, integumentary, neurologic, psychiatric, endocrine. Positive findings noted below. Modified ESAS Completed by: provider Fatigue: 2 Drowsiness: 0 Depression: 0 Pain: 3 Anxiety: 0 Nausea: 0 Anorexia: 3 Dyspnea: 4 Constipation: Yes Stool Occurrence(s): 1 PHYSICAL EXAM:  
 
From RN flowsheet: 
Wt Readings from Last 3 Encounters:  
04/12/19 144 lb (65.3 kg) 06/09/14 192 lb (87.1 kg) 04/09/14 193 lb 6.4 oz (87.7 kg) Blood pressure (!) 79/57, pulse 100, temperature 97.8 °F (36.6 °C), resp. rate 16, height 5' 9\" (1.753 m), weight 144 lb (65.3 kg), SpO2 91 %. Pain Scale 1: Numeric (0 - 10) Pain Intensity 1: 4 Pain Onset 1: ongoing Pain Location 1: Abdomen Pain Orientation 1: Right, Lower Pain Description 1: Aching Pain Intervention(s) 1: Medication (see MAR), Emotional support Last bowel movement, if known:  
 
Constitutional: Thin, alert and oriented, sleeping Eyes: pupils equal, anicteric ENMT: no nasal discharge, moist mucous membranes Cardiovascular: regular rhythm, distal pulses intact Respiratory: breathing not labored, decreased on the right Gastrointestinal: soft, PEG in place, ileostomy noted, Mild diffuseTTP Musculoskeletal: no deformity, no tenderness to palpation Skin: warm, dry Neurologic: following commands, moving all extremities Psychiatric: full affect, no hallucinations Other: 
 
 
 HISTORY:  
 
Active Problems: Dyspnea (2019) Past Medical History:  
Diagnosis Date  Cancer [facility])   
 esophagus cancer, right lung cancer  Chronic obstructive pulmonary disease ([facility] [address]  Diabetes ([facility] [address]  Hypertension  Sleep apnea   
 no cpap Past Surgical History:  
Procedure Laterality Date  COLONOSCOPY N/A 2019 COLONOSCOPY performed by Lissett Grace MD at [address] HX ENDOSCOPY    
 w/ PEG tube  HX HEENT    
 14years old had eye surgery  IR INSERT CATH PLEURAL INDWELL  2019 Family History Problem Relation Age of Onset  Heart Disease Mother  Hypertension Mother  Heart Disease Brother  Heart Disease Maternal Grandmother  Heart Disease Paternal Grandmother History reviewed, no pertinent family history. Social History Tobacco Use  Smoking status: Former Smoker Last attempt to quit: 2018 Years since quittin.1  Smokeless tobacco: Never Used Substance Use Topics  Alcohol use: No  
 
Allergies Allergen Reactions  Codeine Other (comments)  
  hallucinations  Pcn [Penicillins] Hives States \"it's a childhood allergy\" Current Facility-Administered Medications Medication Dose Route Frequency  dextrose 5 % - 0.45% NaCl infusion  75 mL/hr IntraVENous CONTINUOUS  
  aluminum-magnesium hydroxide (MAALOX) oral suspension 30 mL  30 mL Oral Q6H PRN  
 morphine injection 4 mg  4 mg IntraVENous Q3H PRN  
 lidocaine (PF) (XYLOCAINE) 10 mg/mL (1 %) injection 0.1 mL  0.1 mL SubCUTAneous PRN  
 levalbuterol (XOPENEX) nebulizer soln 1.25 mg/3 mL  1.25 mg Nebulization Q6H RT  
 sodium chloride (NS) flush 5-40 mL  5-40 mL IntraVENous Q8H  
 sodium chloride (NS) flush 5-40 mL  5-40 mL IntraVENous PRN  
 acetaminophen (TYLENOL) tablet 650 mg  650 mg Oral Q4H PRN  
 ondansetron (ZOFRAN) injection 4 mg  4 mg IntraVENous Q4H PRN  
 oxyCODONE (ROXICODONE INTENSOL) 20 mg/mL concentrated solution 5 mg  5 mg Oral Q4H PRN  
 sertraline (ZOLOFT) tablet 50 mg  50 mg Oral DAILY  
 
 
 
 LAB AND IMAGING FINDINGS:  
 
Lab Results Component Value Date/Time WBC 10.9 04/23/2019 05:40 AM  
 HGB 10.9 (L) 04/23/2019 05:40 AM  
 PLATELET 015 36/98/1388 05:40 AM  
 
Lab Results Component Value Date/Time Sodium 143 04/23/2019 05:40 AM  
 Potassium 4.2 04/23/2019 05:40 AM  
 Chloride 113 (H) 04/23/2019 05:40 AM  
 CO2 23 04/23/2019 05:40 AM  
 BUN 48 (H) 04/23/2019 05:40 AM  
 Creatinine 2.01 (H) 04/23/2019 05:40 AM  
 Calcium 8.7 04/23/2019 05:40 AM  
 Magnesium 1.8 04/23/2019 05:40 AM  
 Phosphorus 3.0 04/23/2019 05:40 AM  
  
Lab Results Component Value Date/Time AST (SGOT) 12 (L) 04/23/2019 05:40 AM  
 Alk. phosphatase 56 04/23/2019 05:40 AM  
 Protein, total 4.8 (L) 04/23/2019 05:40 AM  
 Albumin 1.9 (L) 04/23/2019 05:40 AM  
 Globulin 2.9 04/23/2019 05:40 AM  
 
No results found for: INR, PTMR, PTP, PT1, PT2, APTT No results found for: IRON, FE, TIBC, IBCT, PSAT, FERR No results found for: PH, PCO2, PO2 No components found for: [place] No results found for: CPK, CKMB Total time: 25 
Counseling / coordination time, spent as noted above: 20 
> 50% counseling / coordination?: yes Prolonged service was provided for  []30 min   []75 min in face to face time in the presence of the patient, spent as noted above. Time Start:  
Time End:  
Note: this can only be billed with 33090 (initial) or 13962 (follow up). If multiple start / stop times, list each separately.

## 2019-04-24 NOTE — PROGRESS NOTES
Bedside and Verbal shift change report given to Lauren (oncoming nurse) by Aster Rosado (offgoing nurse).  Report included the following information SBAR, Kardex and Recent Results.

## 2019-04-24 NOTE — PROGRESS NOTES
Today's Updates/Plan 
  
1. Palliative care and hospice are following. Pt is receptive to comfort care once he discharges home. 
  
  
2. CM will continue to follow-up with discharge plan. 
  
  
Discharge plan 
  
1. home with comfort care once medically stable.  
  
  
TONY Maurice, CM [facility]

## 2019-04-24 NOTE — PROGRESS NOTES
[facility] Medical Progress Note NAME: Sophie Mcgraw :  1970 MRM:  [id number] Date/Time: 2019  8:59 AM   
 
  
Assessment and Plan:  
 
Esophageal cancer / Esophageal stricture / PEG tube / Nausea / vomiting / abdominal pain - Esophageal stricture noted on EGD. Also pSBO vs Ileus noted on KUB. Tolerated diverting ileostomy 4/15. PEG now clamped off. Gen surgery following. Resumed some PO liquids now, and then PEG feedings, if ileus resolves. However, with carcinomatosis, there is some possibility that bowel function never recovers well, and if not, accelerated admission to hospice warranted. We will know in a few days. Pleural effusion / Lung CA / Dyspnea - Large malignant pleural effusion POA, s/p 2.1L thoracentesis, now S/p PleurX catheter. Pulm following, and now have ordered pleurex drainage every other day. At this point his fluid loss exceeds intake, and he is becoming tachycardic and hypotensive. Will need IVF while here prn. This will be a major issue for hospice at home, and dehydration will be a chronic issue. 
  
Obstructing colon mass / Rectal pain / anorectal carcinoma / advanced carcinomatous disease / Ascites - Bx unremarkable, yet strong clinical Dx of cancer. Diffuse carcinomatosis with bulky partially obstructing disease at hepatic flexure noted perioperatively. Once stable PEG intake, of if we conclude that he cannot take any feedings at all, plan is to enroll in home hospice at home. 
  
Anemia - Due to cancer and poor nutrition. It worsened with hydration post op as expected CKD stage 4 - Renal US showed no evidence of obstruction. Cr stable. 
  
Hypotension / tachycardia - Continue IVF. Will give another bolus now Anxiety and depression - continue zoloft. Subjective: Chief Complaint:  Pain stable. Tolerating sips. Overall stable, but not improving much ROS: 
(bold if positive, if negative) Abd PainTolerating minimal PT Tolerating sips Diet Objective:  
 
Last 24hrs VS reviewed since prior progress note. Most recent are: 
 
Visit Vitals BP 95/51 (BP 1 Location: Left arm, BP Patient Position: At rest) Pulse 96 Temp 98.2 °F (36.8 °C) Resp 16 Ht 5' 9\" (1.753 m) Wt 65.3 kg (144 lb) SpO2 98% BMI 21.27 kg/m² SpO2 Readings from Last 6 Encounters:  
04/24/19 98% 06/09/14 99% 04/09/14 97% 01/30/14 97% 01/18/14 99% O2 Flow Rate (L/min): 2 l/min Intake/Output Summary (Last 24 hours) at 4/24/2019 9174 Last data filed at 4/24/2019 5224 Gross per 24 hour Intake 5619.16 ml Output 400 ml Net 5219.16 ml Physical Exam: 
 
Gen:  Frail, ill-appearing, in no acute distress HEENT:  Pink conjunctivae, PERRL, hearing intact to voice, moist mucous membranes Neck:  Supple, without masses, thyroid non-tender Resp:  No accessory muscle use, distant breath sounds without wheezes rales or rhonchi 
Card:  No murmurs, tachycardic S1, S2 without thrills, bruits or peripheral edema Abd:  Soft, non-tender, non-distended, normoactive bowel sounds are present, no mass, PEG, ileostomy Lymph:  No cervical or inguinal adenopathy Musc:  No cyanosis or clubbing Skin:  No rashes or ulcers, skin turgor is reduced Neuro:  Cranial nerves are grossly intact, general motor weakness, follows commands slowly Psych:  Good insight, oriented to person, place and time, alert Telemetry reviewed:   normal sinus rhythm 
__________________________________________________________________ Medications Reviewed: (see below) Medications:  
 
Current Facility-Administered Medications Medication Dose Route Frequency  dextrose 5 % - 0.45% NaCl infusion  75 mL/hr IntraVENous CONTINUOUS  
 aluminum-magnesium hydroxide (MAALOX) oral suspension 30 mL  30 mL Oral Q6H PRN  
 morphine injection 4 mg  4 mg IntraVENous Q3H PRN  
 lidocaine (PF) (XYLOCAINE) 10 mg/mL (1 %) injection 0.1 mL  0.1 mL SubCUTAneous PRN  
 levalbuterol (XOPENEX) nebulizer soln 1.25 mg/3 mL  1.25 mg Nebulization Q6H RT  
 sodium chloride (NS) flush 5-40 mL  5-40 mL IntraVENous Q8H  
 sodium chloride (NS) flush 5-40 mL  5-40 mL IntraVENous PRN  
 acetaminophen (TYLENOL) tablet 650 mg  650 mg Oral Q4H PRN  
 ondansetron (ZOFRAN) injection 4 mg  4 mg IntraVENous Q4H PRN  
 oxyCODONE (ROXICODONE INTENSOL) 20 mg/mL concentrated solution 5 mg  5 mg Oral Q4H PRN  
 sertraline (ZOLOFT) tablet 50 mg  50 mg Oral DAILY Lab Data Reviewed: (see below) Lab Review:  
 
Recent Labs  
  04/23/19 0540 04/22/19 0427 WBC 10.9 8.0 HGB 10.9* 10.2* HCT 34.3* 32.7*  
 274 Recent Labs  
  04/23/19 0540 04/22/19 0427  146*  
K 4.2 4.3 * 115* CO2 23 23 * 145* BUN 48* 48* CREA 2.01* 1.91* CA 8.7 8.7 MG 1.8 1.8 PHOS 3.0  --   
ALB 1.9*  --   
TBILI 0.3  --   
SGOT 12*  --   
ALT 8*  --   
 
Lab Results Component Value Date/Time Glucose (POC) 133 (H) 04/17/2019 12:25 PM  
 Glucose (POC) 130 (H) 04/12/2019 07:41 AM  
 Glucose (POC) 251 (H) 01/18/2014 02:22 PM  
 
No results for input(s): PH, PCO2, PO2, HCO3, FIO2 in the last 72 hours. No results for input(s): INR in the last 72 hours. No lab exists for component: INREXT, INREXT All Micro Results Procedure Component Value Units Date/Time URINE CULTURE HOLD SAMPLE [[id number]] Collected:  04/19/19 0934 Order Status:  Completed Specimen:  Urine from Serum Updated:  04/19/19 5761 Urine culture hold URINE ON HOLD IN MICROBIOLOGY DEPT FOR 3 DAYS. IF UNPRESERVED URINE IS SUBMITTED, IT CANNOT BE USED FOR ADDITIONAL TESTING AFTER 24 HRS, RECOLLECTION WILL BE REQUIRED. CULTURE, BODY FLUID [[id number]] Collected:  04/12/19 1227 Order Status:  Completed Specimen:  Pleural Fluid Updated:  04/16/19 5627   Special Requests: NO SPECIAL REQUESTS     
  GRAM STAIN 3+ WBCS SEEN     
   NO ORGANISMS SEEN     
 Culture result: NO GROWTH 4 DAYS Other pertinent lab: none Total time spent with patient: 28 Minutes I reviewed chart, notes, data and current medications in the medical record. I have examined and treated the patient at bedside during this period. Care Plan discussed with: Patient, Family, Nursing Staff and >50% of time spent in counseling and coordination of care Discussed:  Care Plan and D/C Planning Prophylaxis:  Lovenox Disposition:  Home w/Family 
        
___________________________________________________ Attending Physician: Aleta Garcia MD

## 2019-04-24 NOTE — PROGRESS NOTES
SURGERY PROGRESS NOTE Admit Date: 2019 POD 7 Days Post-Op Procedure: Procedure(s): LAPAROSCOPIC CONVERTED TO OPEN  DIVERTING ILEOSTOMY Subjective:  
Feels ok Still with nausea Objective:  
 
Visit Vitals BP 95/51 (BP 1 Location: Left arm, BP Patient Position: At rest) Pulse 96 Temp 98.2 °F (36.8 °C) Resp 16 Ht 5' 9\" (1.753 m) Wt 65.3 kg (144 lb) SpO2 98% BMI 21.27 kg/m² Temp (24hrs), Av.8 °F (36.6 °C), Min:97.3 °F (36.3 °C), Max:98.2 °F (36.8 °C) Physical Exam: Abdomen:  Soft. Appropriately tender, non-distended. Incision C/D/I. Ostomy non productive Lab Results Component Value Date/Time WBC 10.9 2019 05:40 AM  
 HGB 10.9 (L) 2019 05:40 AM  
 Hemoglobin (POC) 17.0 2014 02:22 PM  
 HCT 34.3 (L) 2019 05:40 AM  
 Hematocrit (POC) 50 2014 02:22 PM  
 PLATELET 091  05:40 AM  
 MCV 91.7 2019 05:40 AM  
 
Lab Results Component Value Date/Time GFR est non-AA 35 (L) 2019 05:40 AM  
 GFRNA, POC 40 (L) 2018 04:15 PM  
 GFR est AA 43 (L) 2019 05:40 AM  
 GFRAA, POC 49 (L) 2018 04:15 PM  
 Creatinine 2.01 (H) 2019 05:40 AM  
 Creatinine (POC) 1.8 (H) 2018 04:15 PM  
 BUN 48 (H) 2019 05:40 AM  
 BUN (POC) 25 (H) 2014 02:22 PM  
 Sodium 143 2019 05:40 AM  
 Sodium (POC) 137 2014 02:22 PM  
 Potassium 4.2 2019 05:40 AM  
 Potassium (POC) 3.8 2014 02:22 PM  
 Chloride 113 (H) 2019 05:40 AM  
 Chloride (POC) 105 2014 02:22 PM  
 CO2 23 2019 05:40 AM  
 Magnesium 1.8 2019 05:40 AM  
 Phosphorus 3.0 2019 05:40 AM  
 
 
Assessment:  
 
Active Problems: Dyspnea (2019) Plan/Recommendations/Medical Decision Making: SBFT today to eval ileus Needs to be OOB and ambulate Awaiting return of bowel function

## 2019-04-24 NOTE — ROUTINE PROCESS
Pt leaving for XR at this time. 11:50 - Pt had returned from XR at this time. Pt in stable condition, will continue to monitor.

## 2019-04-24 NOTE — ROUTINE PROCESS
14:47 - Pt noted hypotensive, asymptomatic at this time. Dr. Liyah Carpenter notified and at bedside at this time. FRANSISCA Mendez at bedside. 16:10 - NS bolus completed. Pt remains hypotensive, asymptomatic. Will notify Dr. Liyah Carpenter. 16:15 - Dr. Liyah Carpenter notified, no orders received. Will continue to monitor. 18:00 - Pt remains hypotensive and asymptomatic. No orders received, will continue to monitor.

## 2019-04-24 NOTE — PROGRESS NOTES
Dr. Galdamez Pae present at the bedside to eval patient. Pt asymptomatic. BP was running in the mid 70's/40's. Glucose checked and was WNL. Orders for 1000 ml NS bolus. Radha Quintanilla and started. Call bell in reach. Pt is aware of how to use call bell.

## 2019-04-25 NOTE — PROGRESS NOTES
Bedside shift change report given to Dillan Townsend (oncoming nurse) by Duran Monroe (offgoing nurse). Report included the following information SBAR and Kardex.

## 2019-04-25 NOTE — ROUTINE PROCESS
Bedside and Verbal shift change report given to Jacek Juarez 1313 (oncoming nurse) by Cecilio Keller (offgoing nurse).  Report included the following information SBAR, Kardex, Intake/Output, MAR, Accordion and Recent Results. '

## 2019-04-25 NOTE — PROGRESS NOTES
[facility] 
[address] [facility], [address] [place] [address] 
[phone] Medical Progress Note NAME:         Pilar Juarez :        1970 MRM:        [id number] Date:          2019 Subjective: Patient has been seen and examined as a follow up for multiple issues. Chart, labs, diagnostics reviewed. He says he feels well but weak. No SOB. No chest discomfort. Discussed with his nurse Objective: 
 
Vital Signs: 
 
Visit Vitals BP 95/64 (BP 1 Location: Left arm, BP Patient Position: At rest) Pulse (!) 118 Temp 97.6 °F (36.4 °C) Resp 17 Ht 5' 9\" (1.753 m) Wt 65.3 kg (144 lb) SpO2 94% BMI 21.27 kg/m² Intake/Output Summary (Last 24 hours) at 2019 1314 Last data filed at 2019 4381 Gross per 24 hour Intake  Output 900 ml Net -900 ml Physical Examination: 
 
General:   Weak, cachectic and ill looking patient, uncomfortable but not in distress Eyes:   pink conjunctivae, PERRLA with no discharge. ENT:   no ottorrhea or rhinorrhea with moist mucous membranes Neck: no masses, thyroid non-tender and trachea central. 
Pulm: clear but diminished breath sounds without crackles or wheezes Card:  no JVD or murmurs, has sinus tachycardia, without thrills, bruits or peripheral edema Abd:  Soft, non-tender, non-distended, normoactive bowel sounds Musc:  No cyanosis, clubbing, atrophy or deformities. Skin:  No rashes, bruising or ulcers. Neuro: Awake and alert. Generally a non focal exam. Follows commands appropriately Psych:  Has a fair insight and is oriented x 3 Current Facility-Administered Medications Medication Dose Route Frequency  dextrose 5 % - 0.45% NaCl infusion  75 mL/hr IntraVENous CONTINUOUS  
 aluminum-magnesium hydroxide (MAALOX) oral suspension 30 mL  30 mL Oral Q6H PRN  
 morphine injection 4 mg  4 mg IntraVENous Q3H PRN  
  lidocaine (PF) (XYLOCAINE) 10 mg/mL (1 %) injection 0.1 mL  0.1 mL SubCUTAneous PRN  
 levalbuterol (XOPENEX) nebulizer soln 1.25 mg/3 mL  1.25 mg Nebulization Q6H RT  
 sodium chloride (NS) flush 5-40 mL  5-40 mL IntraVENous Q8H  
 sodium chloride (NS) flush 5-40 mL  5-40 mL IntraVENous PRN  
 acetaminophen (TYLENOL) tablet 650 mg  650 mg Oral Q4H PRN  
 ondansetron (ZOFRAN) injection 4 mg  4 mg IntraVENous Q4H PRN  
 oxyCODONE (ROXICODONE INTENSOL) 20 mg/mL concentrated solution 5 mg  5 mg Oral Q4H PRN  
 sertraline (ZOLOFT) tablet 50 mg  50 mg Oral DAILY Laboratory data and review: 
 
Recent Labs  
  04/25/19 
0433 04/23/19 
0540 WBC 12.8* 10.9 HGB 10.3* 10.9* HCT 32.9* 34.3*  
 302 Recent Labs  
  04/25/19 
0433 04/23/19 
0540  143  
K 4.2 4.2 * 113* CO2 23 23 * 187* BUN 49* 48* CREA 2.58* 2.01* CA 8.4* 8.7 MG 1.7 1.8 PHOS  --  3.0 ALB  --  1.9*  
SGOT  --  12* ALT  --  8* No components found for: [place] Assessment and Plan: 
 
Obstructing colon mass / Rectal pain / anorectal carcinoma / advanced carcinomatous disease / Ascites: Bx showed a metastatic adenocarcinoma. Diffuse carcinomatosis with bulky partially obstructing disease at hepatic flexure noted perioperatively. Once stable PEG intake as tolerated. Likely will need hospice.  
  
Esophageal cancer / Esophageal stricture / PEG tube / Nausea / vomiting / abdominal pain POA: esophageal stricture noted on EGD as well as a pSBO vs Ileus noted on KUB. Tolerated diverting ileostomy 4/15. Surgery following. Continue PO liquids then PEG feedings, if and when ileus resolves. Monitor. Palliative care following Pleural effusion / Lung CA / Dyspnea: large malignant pleural effusion POA, s/p 2.1L thoracentesis, now S/p PleurX catheter. Pulm following, and pleurex drainage frequency decreased th TTS.  Still with more loss than intake and remains borderline hypotensive with tachycardia. Give IV fluids as needed. Monitor for now pending hospice decision as dehydration will be a chronic issue. 
  
Anemia : due to cancer and poor nutrition. It worsened with hydration post op as expected. Monitor 
  
CKD stage 4 - Renal US showed no evidence of obstruction. Cr stable. 
  
Hypotension / tachycardia: Continue IVF. Follow clinically  
  
Anxiety and depression: Continue Zoloft. Total time spent for the patient's care: 35 Minutes Care Plan discussed with: Patient, Care Manager and Nursing Staff Discussed:  Care Plan and D/C Planning Prophylaxis:  SCD's Anticipated Disposition:  Hospice 
        
___________________________________________________ Attending Physician:   Alicia Ritchie MD

## 2019-04-25 NOTE — PROGRESS NOTES
Name: Herminia Holley Dr: [address]  
: 1970 Admit Date: 2019 Phone:   Room: 535/01 PCP: Violette Carr MD  MRN: [id number] Date: 2019  Code: Full Code Chart and notes reviewed. Data reviewed. I review the patient's current medications in the medical record at each encounter. I have evaluated and examined the patient. History of Present Illness: 
Mr. Kelsy Roberts is a 43 yo gentleman with a history esophageal cancer, lung cancer, LIZETH (does not wear CPAP), COPD, and new suspected anal/rectal cancer. Found to have a large right sided pleural effusion. Underwent colonoscopy and EGD today. Had his PEG replaced and found to have significant anal/rectal stenosis, suspicious for malignancy. He had a chest x-ray for shortness of breath and found to have a right sided effusion. He tells me has been short of breath for quite some time and attributed it to his COPD. No cough, chest pain, f/c. 
 
Labs reviewed: no new labs today other than a glucose of 130 I have personally reviewed chest CT from [facility] in 2019. Pt with mod R effusion at that time and mass like lesion in RLL. PET scan in Feb with increased uptake in RLL w mod effusion. New R paracolic soft tissue mass w increased uptake Overnight Events Afebrile Mildly tachycardic; HR 100s BP soft O2 sats 97% on RA Feels well this morning. Denies SOB or cough. 400mLs removed yesterday. Hasn't been getting out of bed. Past Medical History:  
Diagnosis Date  Cancer [facility])   
 esophagus cancer, right lung cancer  Chronic obstructive pulmonary disease ([facility]  Diabetes ([facility]  Hypertension  Sleep apnea   
 no cpap Past Surgical History:  
Procedure Laterality Date  COLONOSCOPY N/A 2019 COLONOSCOPY performed by Mark Barroso MD at [facility] HX ENDOSCOPY    
 w/ PEG tube  HX HEENT    
 14years old had eye surgery  IR INSERT CATH PLEURAL INDWELL  2019 Family History Problem Relation Age of Onset  Heart Disease Mother  Hypertension Mother  Heart Disease Brother  Heart Disease Maternal Grandmother  Heart Disease Paternal Grandmother Social History Tobacco Use  Smoking status: Former Smoker Last attempt to quit: 2018 Years since quittin.1  Smokeless tobacco: Never Used Substance Use Topics  Alcohol use: No  
 
 
Allergies Allergen Reactions  Codeine Other (comments)  
  hallucinations  Pcn [Penicillins] Hives States \"it's a childhood allergy\" Current Facility-Administered Medications Medication Dose Route Frequency  dextrose 5 % - 0.45% NaCl infusion  75 mL/hr IntraVENous CONTINUOUS  
 aluminum-magnesium hydroxide (MAALOX) oral suspension 30 mL  30 mL Oral Q6H PRN  
 morphine injection 4 mg  4 mg IntraVENous Q3H PRN  
 lidocaine (PF) (XYLOCAINE) 10 mg/mL (1 %) injection 0.1 mL  0.1 mL SubCUTAneous PRN  
 levalbuterol (XOPENEX) nebulizer soln 1.25 mg/3 mL  1.25 mg Nebulization Q6H RT  
 sodium chloride (NS) flush 5-40 mL  5-40 mL IntraVENous Q8H  
 sodium chloride (NS) flush 5-40 mL  5-40 mL IntraVENous PRN  
 acetaminophen (TYLENOL) tablet 650 mg  650 mg Oral Q4H PRN  
 ondansetron (ZOFRAN) injection 4 mg  4 mg IntraVENous Q4H PRN  
 oxyCODONE (ROXICODONE INTENSOL) 20 mg/mL concentrated solution 5 mg  5 mg Oral Q4H PRN  
 sertraline (ZOLOFT) tablet 50 mg  50 mg Oral DAILY REVIEW OF SYSTEMS  
12 point ROS negative except as stated in the HPI. Physical Exam:  
Visit Vitals BP 92/65 (BP 1 Location: Left arm, BP Patient Position: At rest) Pulse (!) 111 Temp 98.5 °F (36.9 °C) Resp 16 Ht 5' 9\" (1.753 m) Wt 65.3 kg (144 lb) SpO2 97% BMI 21.27 kg/m² General:  Alert, cooperative, ill appearing, no acute distress, appears stated age. Head:  Normocephalic, without obvious abnormality, atraumatic. Eyes:  Conjunctivae/corneas clear. Nose: Nares normal. Septum midline. Mucosa normal.   
Throat: Lips, mucosa, and tongue normal.   
Neck: Supple, symmetrical, trachea midline, no adenopathy. Lungs:   R ant chest with air entry but diminished in lower right 2/3 lung field. R pleurx dressing: clean, dry and intact. Left sounds clear. Chest wall:  No tenderness or deformity. Heart:  Regular rate and rhythm, S1, S2 normal, no murmur, click, rub or gallop. Abdomen:   Soft, mildly tender. PEG in place. Ostomy in place with brown stool. Extremities: Extremities normal, atraumatic, no cyanosis or edema. Pulses: 2+ and symmetric all extremities. Skin: Skin color, texture, turgor normal. No rashes or lesions Lymph nodes: Cervical, supraclavicular nodes normal.  
Neurologic: Grossly nonfocal  
 
 
Lab Results Component Value Date/Time Sodium 143 04/25/2019 04:33 AM  
 Potassium 4.2 04/25/2019 04:33 AM  
 Chloride 112 (H) 04/25/2019 04:33 AM  
 CO2 23 04/25/2019 04:33 AM  
 BUN 49 (H) 04/25/2019 04:33 AM  
 Creatinine 2.58 (H) 04/25/2019 04:33 AM  
 Glucose 180 (H) 04/25/2019 04:33 AM  
 Calcium 8.4 (L) 04/25/2019 04:33 AM  
 Magnesium 1.7 04/25/2019 04:33 AM  
 Phosphorus 3.0 04/23/2019 05:40 AM  
 
 
Lab Results Component Value Date/Time WBC 12.8 (H) 04/25/2019 04:33 AM  
 HGB 10.3 (L) 04/25/2019 04:33 AM  
 PLATELET 022 01/89/7104 04:33 AM  
 MCV 92.4 04/25/2019 04:33 AM  
 
 
Lab Results Component Value Date/Time AST (SGOT) 12 (L) 04/23/2019 05:40 AM  
 Alk. phosphatase 56 04/23/2019 05:40 AM  
 Protein, total 4.8 (L) 04/23/2019 05:40 AM  
 Albumin 1.9 (L) 04/23/2019 05:40 AM  
 Globulin 2.9 04/23/2019 05:40 AM  
 
 
No results found for: IRON, FE, TIBC, IBCT, PSAT, FERR No results found for: SR, CRP, TALA, ANAIGG, RA, RPR, RPRT, VDRLT, VDRLS, TSH, TSHEXT, TSHEXT No results found for: PH, PHI, PCO2, PCO2I, PO2, PO2I, HCO3, HCO3I, FIO2, FIO2I 
 
 No results found for: CPK, RCK1, RCK2, RCK3, RCK4, CKNDX, CKND1, TROPT, TROIQ, BNPP, BNP Lab Results Component Value Date/Time Culture result: NO GROWTH 4 DAYS 04/12/2019 12:27 PM  
 
 
No results found for: TOXA1, RPR, HBCM, HBSAG, HAAB, HCAB1, HAAT, G6PD, CRYAC, HIVGT, HIVR, HIV1, HIV12, HIVPC, HIVRPI No results found for: VANCT, CPK Lab Results Component Value Date/Time Color RED 04/19/2019 09:34 AM  
 Appearance BLOODY (A) 04/19/2019 09:34 AM  
 pH (UA) 5.5 04/19/2019 09:34 AM  
 Protein 300 (A) 04/19/2019 09:34 AM  
 Glucose NEGATIVE  04/19/2019 09:34 AM  
 Ketone 15 (A) 04/19/2019 09:34 AM  
 Blood LARGE (A) 04/19/2019 09:34 AM  
 Urobilinogen 1.0 04/19/2019 09:34 AM  
 Nitrites POSITIVE (A) 04/19/2019 09:34 AM  
 Leukocyte Esterase MODERATE (A) 04/19/2019 09:34 AM  
 WBC 10-20 04/19/2019 09:34 AM  
 RBC >100 (H) 04/19/2019 09:34 AM  
 Bacteria 3+ (A) 04/19/2019 09:34 AM  
 
 
IMPRESSION 
· Malignant pleural effusion: adenocarcinoma. S/p Pleurx drain 4/16.  400-500 cc being drained. Will try decreasing drainage to [facility]. · Esophageal cancer · Lung cancer · Suspected anal/rectal cancer · Partial SBO · COPD · Sleep apnea (does not wear CPAP) PLAN 
· Goal sats >90%; maintaining well on RA 
· Decrease draining PleurX to T/Th/Sat · Cont with xopenex nebs · Pain control per Palliative Care · Clear liquid diet · OOB as able: will have PT see · Patient and family considering hospice.   
 
 
 
 
 
Denita Gallego NP

## 2019-04-25 NOTE — PROGRESS NOTES
SURGERY PROGRESS NOTE Admit Date: 2019 POD 8 Days Post-Op Procedure: Procedure(s): LAPAROSCOPIC CONVERTED TO OPEN  DIVERTING ILEOSTOMY Subjective:  
Reports improved nausea - tolerated jello Objective:  
 
Visit Vitals BP 95/64 (BP 1 Location: Left arm, BP Patient Position: At rest) Pulse (!) 118 Temp 97.6 °F (36.4 °C) Resp 17 Ht 5' 9\" (1.753 m) Wt 65.3 kg (144 lb) SpO2 94% BMI 21.27 kg/m² Temp (24hrs), Av.6 °F (36.4 °C), Min:97.3 °F (36.3 °C), Max:98.5 °F (36.9 °C) Physical Exam: Abdomen:  Soft. Non-tender, non-distended. Incision C/D/I. Ostomy patent not productive Lab Results Component Value Date/Time WBC 12.8 (H) 2019 04:33 AM  
 HGB 10.3 (L) 2019 04:33 AM  
 Hemoglobin (POC) 17.0 2014 02:22 PM  
 HCT 32.9 (L) 2019 04:33 AM  
 Hematocrit (POC) 50 2014 02:22 PM  
 PLATELET 118  04:33 AM  
 MCV 92.4 2019 04:33 AM  
 
Lab Results Component Value Date/Time GFR est non-AA 26 (L) 2019 04:33 AM  
 GFRNA, POC 40 (L) 2018 04:15 PM  
 GFR est AA 32 (L) 2019 04:33 AM  
 GFRAA, POC 49 (L) 2018 04:15 PM  
 Creatinine 2.58 (H) 2019 04:33 AM  
 Creatinine (POC) 1.8 (H) 2018 04:15 PM  
 BUN 49 (H) 2019 04:33 AM  
 BUN (POC) 25 (H) 2014 02:22 PM  
 Sodium 143 2019 04:33 AM  
 Sodium (POC) 137 2014 02:22 PM  
 Potassium 4.2 2019 04:33 AM  
 Potassium (POC) 3.8 2014 02:22 PM  
 Chloride 112 (H) 2019 04:33 AM  
 Chloride (POC) 105 2014 02:22 PM  
 CO2 23 2019 04:33 AM  
 Magnesium 1.7 2019 04:33 AM  
 Phosphorus 3.0 2019 05:40 AM  
 
 
Assessment:  
 
Active Problems: Dyspnea (2019) Plan/Recommendations/Medical Decision Making: SBFT yesterday with slow transit KUB today to eval progression of contrast 
Clears as tolerates Needs to be OOB and ambulate

## 2019-04-25 NOTE — PROGRESS NOTES
Palliative Medicine Consult Patient Name: Nora Herrmann YOB: 1970 Date of Initial Consult: 4/12/2019 Reason for Consult: Dr. Tresa Jerome Requesting Provider: Overwhelming symptoms Primary Care Physician: Diana Galdamez MD 
  
 SUMMARY:  
Nora Herrmann is a 44 y.o. with a past history of lung cancer, esophageal cancer hypertension, COPD, status post PEG, who was admitted on 4/12/2019 from home with a diagnosis of newly diagnosed likely anal or rectal CA. Current medical issues leading to Palliative Medicine involvement include: Overwhelming symptoms. History of present illness/past medical history patient is a 23-year-old gentleman with a history of esophageal cancer as well as lung cancer. He is followed Dr. Roni Guerra from [facility]. He was supposed to see [facility] in approximately 2 weeks for follow-up on his esophageal cancer. He already has a PEG placed secondary to his esophageal cancer. He states he had been on some type of \"oral chemotherapy agent but this was causing significant side effects and so had recently been stopped. He is been having significant anal type pain and had seen Dr. Tresa Jerome as an outpatient. He presents today for attempts at a colonoscopy as well as repeat EGD. Unfortunately EGD showed what appears to be malignant narrowing at 37 to 40 cm. In addition, attempted a colonoscopy were not able to be completed secondary to significant needle stricture, sclerosis. Even a pediatric colonoscope could not pass the anal stenosis. Ultimately an upper GI scope was used to bypass into the rectal area which profuse stool was seen throughout. The anal canal is friable firm and was concerning for anal carcinoma. He was having significant pain and discomfort but that has seemed to improve by the time I have seen the patient. He is waiting Colorectal surgeon evaluation.   He admits that his bowel movements have been less over the last couple months and describes very small stools. His breathing has been worse over the last several weeks. He had used tramadol at home to help with his pain but that gave no relief. Social history he is  to Denis. His sister, Ricardo Murphy is also very involved and is in the room. PALLIATIVE DIAGNOSES:  
1. Cancer associated pain 2. Shortness of breathimproved after thoracentesis on the right with 2 L removed 3. 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 4. Esophageal cancer 5. Lung cancer 6. Advance care planning 7. DNR discussion PLAN:  
1. Met with patient who continues to have intermittent nausea and eating very little by mouth. He is having worsening renal function. Small bowel follow-through very slow transition. Patient really not getting out of bed and appears to be declining here in the hospital.  He continues to be somewhat upbeat but also avoids certain his discussions. No family at bedside. 2. Goals of care attempted to have an extended conversation with the patient about his goals. Discussed if he really wants to get home for any period of time, we may need to think about trying to make that transition sooner than later. We have concerns that may be his bowel function will not ever completely return to normal given the extensive cancer. Consideration for home with hospice soon or even transition to inpatient hospice if needed. I plan on talking with patient and his family tomorrow. His wife is not at the bedside. He struggles sometimes with his conversations and making decisions. 3. Advance care planning he has completed an advanced medical directive. He has assigned his wife as primary medical power of  and his sister at secondary medical power of .   I have asked his family to bring a copy to the hospital. Sister will bring 4. CODE STATUS address CODE STATUS again today. Explained again in detail what resuscitation would look like and I think given his advanced cancer, we would not recommend take full CODE STATUS. Explained I would not want to see him suffer at the end with very little hope that resuscitation would be successful. He continues to either change the conversation or attempt to divert to having to talk with his sister and wife. He does admit that he does not want them to see him going through what resuscitation. We will continue to address this on a regular basis. 5. Symptom management he has used oxycodone 5 mg x 1 doses in the last 24 hours. In addition he has used Zofran 4 mg IV x2-3 doses. Given the bowel issues, will add Compazine 5 mg every 6 hours on a as needed basis. If bowel function return continues to be an issue, consider adding Decadron IV which sometimes can help with symptoms of pain when bowel obstruction is involved. We will continue to monitor closely. 6. Discussed with bedside nurse and Vi(hospice liaison) 7. Initial consult note routed to primary continuity provider 8. Communicated plan of care with: Palliative IDT 
 
 
 GOALS OF CARE / TREATMENT PREFERENCES:  
[====Goals of Care====] GOALS OF CARE: 
Patient/Health Care Proxy Stated Goals: Prolong life TREATMENT PREFERENCES:  
Code Status: Full Code Advance Care Planning: 
Advance Care Planning 4/13/2019 Confirm Advance Directive Yes, not on file Medical Interventions: Full interventions Other Instructions:  
Artificially Administered Nutrition: Feeding tube long-term, if indicated The palliative care team has discussed with patient / health care proxy about goals of care / treatment preferences for patient. 
[====Goals of Care====] HISTORY:  
 
History obtained from: Chart, spouse, sister CHIEF COMPLAINT: Rectal pain HPI/SUBJECTIVE:   
 The patient is:  
[x] Verbal and participatory [] Non-participatory due to:  
Patient is feeling much better. He has had a thoracentesis that removed 2 L of fluid. Feels like his breathing is much improved. 4/17 patient is just returned from the operating room and is still a little bit lethargic. 4/18-awake and alert. Still pain in the abdomen 4/19-pain is better. Lots of blood in the pleur-x drain-did contact pulmonary via bedside nurse 4/22-pain seems to be improved. Less discomfort over the Pleurx drain. 4/23 feels like he is tolerating small amounts of liquids. Pain seems less. 4/24 patient sleeping. Had a small bowel follow-through earlier today. Did not feel like we needed to wake him up. 
 
4/25-patient is awake and alert. Eating very little no significant pain at this time. Clinical Pain Assessment (nonverbal scale for severity on nonverbal patients):  
Clinical Pain Assessment Severity: 3 Location: Anal area Character: Throbbing and aching Duration: Months Effect: Difficult to have a bowel movement Factors: Constipation Frequency: Intermittent Adult Nonverbal Pain Scale Face: No particular expression or smile Activity (Movement): Lying quietly, normal position Guarding: Lying quietly, no positioning of hands over areas of body Physiology (Vital Signs): Stable vital signs Respiratory: Baseline RR/SpO2 compliant with ventilator Total Score: 0 
 
 
 FUNCTIONAL ASSESSMENT:  
 
Palliative Performance Scale (PPS): PPS: 70 PSYCHOSOCIAL/SPIRITUAL SCREENING:  
 
Advance Care Planning: 
Advance Care Planning 4/13/2019 Confirm Advance Directive Yes, not on file Any spiritual / Moravian concerns: 
[] Yes /  [x] No 
 
Caregiver Burnout: 
[] Yes /  [x] No /  [] No Caregiver Present Anticipatory grief assessment:  
[x] Normal  / [] Maladaptive ESAS Anxiety: Anxiety: 0 
 
ESAS Depression: Depression: 0  REVIEW OF SYSTEMS:  
 
 Positive and pertinent negative findings in ROS are noted above in HPI. The following systems were [x] reviewed / [] unable to be reviewed as noted in HPI Other findings are noted below. Systems: constitutional, ears/nose/mouth/throat, respiratory, gastrointestinal, genitourinary, musculoskeletal, integumentary, neurologic, psychiatric, endocrine. Positive findings noted below. Modified ESAS Completed by: provider Fatigue: 2 Drowsiness: 0 Depression: 0 Pain: 3 Anxiety: 0 Nausea: 0 Anorexia: 3 Dyspnea: 4 Constipation: Yes Stool Occurrence(s): 1 PHYSICAL EXAM:  
 
From RN flowsheet: 
Wt Readings from Last 3 Encounters:  
04/12/19 144 lb (65.3 kg) 06/09/14 192 lb (87.1 kg) 04/09/14 193 lb 6.4 oz (87.7 kg) Blood pressure 101/66, pulse (!) 107, temperature 98 °F (36.7 °C), resp. rate 18, height 5' 9\" (1.753 m), weight 144 lb (65.3 kg), SpO2 98 %. Pain Scale 1: Numeric (0 - 10) Pain Intensity 1: 7 Pain Onset 1: chronic Pain Location 1: Abdomen Pain Orientation 1: Right, Lower Pain Description 1: Aching Pain Intervention(s) 1: Medication (see MAR) Last bowel movement, if known:  
 
Constitutional: Thin, alert and oriented, sleeping Eyes: pupils equal, anicteric ENMT: no nasal discharge, moist mucous membranes Cardiovascular: regular rhythm, distal pulses intact Respiratory: breathing not labored, decreased on the right Gastrointestinal: soft, PEG in place, ileostomy noted, Mild diffuseTTP Musculoskeletal: no deformity, no tenderness to palpation Skin: warm, dry Neurologic: following commands, moving all extremities Psychiatric: full affect, no hallucinations Other: 
 
 
 HISTORY:  
 
Active Problems: Dyspnea (4/12/2019) Past Medical History:  
Diagnosis Date  Cancer [facility])   
 esophagus cancer, right lung cancer  Chronic obstructive pulmonary disease ([facility]  Diabetes ([facility]  Hypertension  Sleep apnea   
 no cpap Past Surgical History: Procedure Laterality Date  COLONOSCOPY N/A 2019 COLONOSCOPY performed by Arnold Lehman MD at [address] HX ENDOSCOPY    
 w/ PEG tube  HX HEENT    
 14years old had eye surgery  IR INSERT CATH PLEURAL INDWELL  2019 Family History Problem Relation Age of Onset  Heart Disease Mother  Hypertension Mother  Heart Disease Brother  Heart Disease Maternal Grandmother  Heart Disease Paternal Grandmother History reviewed, no pertinent family history. Social History Tobacco Use  Smoking status: Former Smoker Last attempt to quit: 2018 Years since quittin.1  Smokeless tobacco: Never Used Substance Use Topics  Alcohol use: No  
 
Allergies Allergen Reactions  Codeine Other (comments)  
  hallucinations  Pcn [Penicillins] Hives States \"it's a childhood allergy\" Current Facility-Administered Medications Medication Dose Route Frequency  dextrose 5 % - 0.45% NaCl infusion  100 mL/hr IntraVENous CONTINUOUS  
 aluminum-magnesium hydroxide (MAALOX) oral suspension 30 mL  30 mL Oral Q6H PRN  
 morphine injection 4 mg  4 mg IntraVENous Q3H PRN  
 lidocaine (PF) (XYLOCAINE) 10 mg/mL (1 %) injection 0.1 mL  0.1 mL SubCUTAneous PRN  
 levalbuterol (XOPENEX) nebulizer soln 1.25 mg/3 mL  1.25 mg Nebulization Q6H RT  
 sodium chloride (NS) flush 5-40 mL  5-40 mL IntraVENous Q8H  
 sodium chloride (NS) flush 5-40 mL  5-40 mL IntraVENous PRN  
 acetaminophen (TYLENOL) tablet 650 mg  650 mg Oral Q4H PRN  
 ondansetron (ZOFRAN) injection 4 mg  4 mg IntraVENous Q4H PRN  
 oxyCODONE (ROXICODONE INTENSOL) 20 mg/mL concentrated solution 5 mg  5 mg Oral Q4H PRN  
 sertraline (ZOLOFT) tablet 50 mg  50 mg Oral DAILY  
 
 
 
 LAB AND IMAGING FINDINGS:  
 
Lab Results Component Value Date/Time WBC 12.8 (H) 2019 04:33 AM  
 HGB 10.3 (L) 2019 04:33 AM  
 PLATELET 058  04:33 AM  
 
Lab Results Component Value Date/Time Sodium 143 04/25/2019 04:33 AM  
 Potassium 4.2 04/25/2019 04:33 AM  
 Chloride 112 (H) 04/25/2019 04:33 AM  
 CO2 23 04/25/2019 04:33 AM  
 BUN 49 (H) 04/25/2019 04:33 AM  
 Creatinine 2.58 (H) 04/25/2019 04:33 AM  
 Calcium 8.4 (L) 04/25/2019 04:33 AM  
 Magnesium 1.7 04/25/2019 04:33 AM  
 Phosphorus 3.0 04/23/2019 05:40 AM  
  
Lab Results Component Value Date/Time AST (SGOT) 12 (L) 04/23/2019 05:40 AM  
 Alk. phosphatase 56 04/23/2019 05:40 AM  
 Protein, total 4.8 (L) 04/23/2019 05:40 AM  
 Albumin 1.9 (L) 04/23/2019 05:40 AM  
 Globulin 2.9 04/23/2019 05:40 AM  
 
No results found for: INR, PTMR, PTP, PT1, PT2, APTT No results found for: IRON, FE, TIBC, IBCT, PSAT, FERR No results found for: PH, PCO2, PO2 No components found for: [place] No results found for: CPK, CKMB Total time: 35 
Counseling / coordination time, spent as noted above: 30 
> 50% counseling / coordination?: yes Prolonged service was provided for  []30 min   []75 min in face to face time in the presence of the patient, spent as noted above. Time Start:  
Time End:  
Note: this can only be billed with 47491 (initial) or 59827 (follow up). If multiple start / stop times, list each separately.

## 2019-04-25 NOTE — PROGRESS NOTES
Today's Updates/Plan 
  
1. Palliative care and hospice are following. Pt is receptive to comfort care once he discharges home. 
  
  
2. CM will continue to follow-up with discharge plan. 
  
  
Discharge plan 
  
1. home with comfort care once medically stable.  
  
  
TONY Maurice, CM [facility]

## 2019-04-26 NOTE — ROUTINE PROCESS
Bedside and Verbal shift change report given to Laura (oncoming nurse) by Janel Blackwell (offgoing nurse). Report included the following information SBAR, Kardex, Intake/Output, MAR, Accordion and Recent Results.

## 2019-04-26 NOTE — PROGRESS NOTES
SURGERY PROGRESS NOTE Admit Date: 2019 POD 8 Days Post-Op Procedure: Procedure(s): LAPAROSCOPIC CONVERTED TO OPEN  DIVERTING ILEOSTOMY Subjective:  
About the same as yesterday. Ostomy functional overnight with bile in bag. No appetite. Objective:  
 
Visit Vitals BP 99/63 (BP 1 Location: Left arm, BP Patient Position: At rest) Pulse 99 Temp 97.8 °F (36.6 °C) Resp 18 Ht 5' 9\" (1.753 m) Wt 65.3 kg (144 lb) SpO2 98% BMI 21.27 kg/m² Temp (24hrs), Av.7 °F (36.5 °C), Min:97.3 °F (36.3 °C), Max:98 °F (36.7 °C) Physical Exam: Abdomen:  Soft. Non-tender, non-distended. Incision C/D/I. Ostomy productive, bile in ostomy with a small amount of gas. Lab Results Component Value Date/Time WBC 12.8 (H) 2019 04:33 AM  
 HGB 10.3 (L) 2019 04:33 AM  
 Hemoglobin (POC) 17.0 2014 02:22 PM  
 HCT 32.9 (L) 2019 04:33 AM  
 Hematocrit (POC) 50 2014 02:22 PM  
 PLATELET 627  04:33 AM  
 MCV 92.4 2019 04:33 AM  
 
Lab Results Component Value Date/Time GFR est non-AA 28 (L) 2019 06:46 AM  
 GFRNA, POC 40 (L) 2018 04:15 PM  
 GFR est AA 34 (L) 2019 06:46 AM  
 GFRAA, POC 49 (L) 2018 04:15 PM  
 Creatinine 2.46 (H) 2019 06:46 AM  
 Creatinine (POC) 1.8 (H) 2018 04:15 PM  
 BUN 49 (H) 2019 06:46 AM  
 BUN (POC) 25 (H) 2014 02:22 PM  
 Sodium 142 2019 06:46 AM  
 Sodium (POC) 137 2014 02:22 PM  
 Potassium 4.0 2019 06:46 AM  
 Potassium (POC) 3.8 2014 02:22 PM  
 Chloride 110 (H) 2019 06:46 AM  
 Chloride (POC) 105 2014 02:22 PM  
 CO2 25 2019 06:46 AM  
 Magnesium 1.7 2019 04:33 AM  
 Phosphorus 3.0 2019 05:40 AM  
 
 
Assessment:  
 
Active Problems: Dyspnea (2019) Plan/Recommendations/Medical Decision Making: SBFT yesterday with slow transit KUB today to eval progression of contrast, should reveal progression of contrast given ostomy output today. Clears as tolerates Needs to be OOB and ambulate Case management on board for disposition

## 2019-04-26 NOTE — PROGRESS NOTES
[address] 
[address] [facility], [address] [place] [address] 
[phone] Medical Progress Note NAME:         Rachel Smith :        1970 MRM:        [id number] Date:          2019 Subjective: Patient has been seen and examined as a follow up for multiple issues. Chart, labs, diagnostics reviewed. He remains weak. No SOB. No chest discomfort. Discussed with his nurse for collaborative hx. Objective: 
 
Vital Signs: 
 
Visit Vitals BP 99/63 (BP 1 Location: Left arm, BP Patient Position: At rest) Pulse 99 Temp 97.8 °F (36.6 °C) Resp 18 Ht 5' 9\" (1.753 m) Wt 65.3 kg (144 lb) SpO2 98% BMI 21.27 kg/m² Intake/Output Summary (Last 24 hours) at 2019 1223 Last data filed at 2019 1154 Gross per 24 hour Intake 3648.2 ml Output 525 ml Net 3123.2 ml Physical Examination: 
 
General:   Weak, cachectic and ill looking patient, uncomfortable but not in distress Eyes:   pink conjunctivae, PERRLA with no discharge. ENT:   no ottorrhea or rhinorrhea with moist mucous membranes Pulm: clear but diminished breath sounds without crackles or wheezes Card:  no JVD or murmurs, has sinus tachycardia, without thrills, bruits or peripheral edema Abd:  Soft, non-tender, non-distended, normoactive bowel sounds Musc:  No cyanosis, clubbing, atrophy or deformities. Skin:  No rashes, bruising or ulcers. Neuro: Awake and alert. Generally a non focal exam. Follows commands appropriately Psych:  Has a fair insight and is oriented x 3 Current Facility-Administered Medications Medication Dose Route Frequency  levalbuterol (XOPENEX) nebulizer soln 1.25 mg/3 mL  1.25 mg Nebulization Q6HWA RT  
 dextrose 5 % - 0.45% NaCl infusion  100 mL/hr IntraVENous CONTINUOUS  
 aluminum-magnesium hydroxide (MAALOX) oral suspension 30 mL  30 mL Oral Q6H PRN  
  morphine injection 4 mg  4 mg IntraVENous Q3H PRN  
 lidocaine (PF) (XYLOCAINE) 10 mg/mL (1 %) injection 0.1 mL  0.1 mL SubCUTAneous PRN  
 sodium chloride (NS) flush 5-40 mL  5-40 mL IntraVENous Q8H  
 sodium chloride (NS) flush 5-40 mL  5-40 mL IntraVENous PRN  
 acetaminophen (TYLENOL) tablet 650 mg  650 mg Oral Q4H PRN  
 ondansetron (ZOFRAN) injection 4 mg  4 mg IntraVENous Q4H PRN  
 oxyCODONE (ROXICODONE INTENSOL) 20 mg/mL concentrated solution 5 mg  5 mg Oral Q4H PRN  
 sertraline (ZOLOFT) tablet 50 mg  50 mg Oral DAILY Laboratory data and review: 
 
Recent Labs  
  04/25/19 
6762 WBC 12.8* HGB 10.3* HCT 32.9*  
 Recent Labs  
  04/26/19 
8971 04/25/19 
6703  143  
K 4.0 4.2 * 112* CO2 25 23 * 180* BUN 49* 49* CREA 2.46* 2.58* CA 8.3* 8.4* MG  --  1.7 No components found for: [place] Assessment and Plan: 
 
Obstructing colon mass / Rectal pain / anorectal carcinoma / advanced carcinomatous disease / Ascites: Bx showed a metastatic adenocarcinoma. Diffuse carcinomatosis with bulky partially obstructing disease at hepatic flexure noted perioperatively. Abdominal Xray done today shows persistent SBO with little barium progression. Palliative to review him with family again today. Continue current measures.   
  
Esophageal cancer / Esophageal stricture / PEG tube / Nausea / vomiting / abdominal pain POA: esophageal stricture noted on EGD as well as a pSBO vs Ileus noted on KUB. Tolerated diverting ileostomy 4/15. Surgery following. Continue clears as tolerated then PEG feedings, if and when obstruction resolves. Monitor. Palliative care following Pleural effusion / Lung CA / Dyspnea: large malignant pleural effusion POA, s/p 2.1L thoracentesis, now S/p PleurX catheter. Pulm following, and pleurex drainage frequency decreased th TTS. Give IV fluids as needed.  Monitor for now pending hospice decision as dehydration will be a chronic issue. 
  
Anemia : due to cancer and poor nutrition. It worsened with hydration post op as expected. Monitor 
  
CKD stage 4 - Renal US showed no evidence of obstruction. Cr stable. 
  
Hypotension / tachycardia: Continue IVF. Follow clinically  
  
Anxiety and depression: Continue Zoloft. Total time spent for the patient's care: 30 Minutes Care Plan discussed with: Patient, Care Manager and Nursing Staff Discussed:  Care Plan and D/C Planning Prophylaxis:  SCD's Anticipated Disposition:  Hospice 
        
___________________________________________________ Attending Physician:   Adan Yusuf MD

## 2019-04-26 NOTE — ACP (ADVANCE CARE PLANNING)
Primary Decision Maker: Yosvany Cormier - [phone] Secondary Decision Maker: Celine Cha - Sister - [phone] Advance Care Planning 4/26/2019 Patient's Healthcare Decision Maker is: Verbal statement (Legal Next of Kin remains as decision maker) Confirm Advance Directive Yes, not on file Pt reportedly has AMD in place which appoints wife Dante Mendoza as primary Medical POA and sister Michele Vazquez as secondary; copy has been requested for chart. Pt remains a full code at this time.

## 2019-04-26 NOTE — PROGRESS NOTES
Pt. Is refusing Prozac b/c he has been very nauseous all night. Pt. Has been refusing all anti-nausea meds b/c he states they don/t help at all. Rn attempts to educate pt. Pt. Continues to deny meds. RN speaks with Dr. Samara Gallo. Will continue to monitor.

## 2019-04-26 NOTE — WOUND CARE
Ostomy care follow up Alert, oriented, no distress. Assessment RLQ colostomy appliance intact, small amount tan brown liquid drainage. Will follow up on 4/29 for appliance change and assessment Heaven Campa

## 2019-04-26 NOTE — PROGRESS NOTES
Physical Therapy - Orders received and acknowledged. Patient's status and outlook is grim. Offered PT services and to assist patient with OOB this afternoon. Patient admitted 14 days ago and been essentially bed bound. Explained benefits of mobility and risks of remaining bed bound. Unable to persuade patient to work with PT. He reports his wife is due any moment to hospital. Family meeting with Michelle Vega planned for later to discuss POC and potentially hospice. PT attempted to encourage patient to get OOB three days ago without success. We can follow up again on Monday 4/29/19 and offer services depending on whether patient and family have decided to pursue hospice. Thank you

## 2019-04-26 NOTE — PROGRESS NOTES
Palliative Medicine Consult Patient Name: Lindsey Michelle YOB: 1970 Date of Initial Consult: 4/12/2019 Reason for Consult: Dr. Luz Christiansen Requesting Provider: Overwhelming symptoms Primary Care Physician: Anjana Wang MD 
  
 SUMMARY:  
Lindsey Michelle is a 44 y.o. with a past history of lung cancer, esophageal cancer hypertension, COPD, status post PEG, who was admitted on 4/12/2019 from home with a diagnosis of newly diagnosed likely anal or rectal CA. Current medical issues leading to Palliative Medicine involvement include: Overwhelming symptoms. History of present illness/past medical history patient is a 42-year-old gentleman with a history of esophageal cancer as well as lung cancer. He is followed Dr. Lucero Delatorre from [address] [place] [address]. He was supposed to see [facility] in approximately 2 weeks for follow-up on his esophageal cancer. He already has a PEG placed secondary to his esophageal cancer. He states he had been on some type of \"oral chemotherapy agent but this was causing significant side effects and so had recently been stopped. He is been having significant anal type pain and had seen Dr. Luz Christiansen as an outpatient. He presents today for attempts at a colonoscopy as well as repeat EGD. Unfortunately EGD showed what appears to be malignant narrowing at 37 to 40 cm. In addition, attempted a colonoscopy were not able to be completed secondary to significant needle stricture, sclerosis. Even a pediatric colonoscope could not pass the anal stenosis. Ultimately an upper GI scope was used to bypass into the rectal area which profuse stool was seen throughout. The anal canal is friable firm and was concerning for anal carcinoma. He was having significant pain and discomfort but that has seemed to improve by the time I have seen the patient. He is waiting Colorectal surgeon evaluation.   He admits that his bowel movements have been less over the last couple months and describes very small stools. His breathing has been worse over the last several weeks. He had used tramadol at home to help with his pain but that gave no relief. Social history he is  to Denis. His sister, Rupal Neves is also very involved and is in the room. PALLIATIVE DIAGNOSES:  
1. Cancer associated pain 2. Shortness of breathimproved after thoracentesis on the right with 2 L removed 3. 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 4. Esophageal cancer 5. Lung cancer 6. Advance care planning 7. DNR discussion 8. Goals of care PLAN:  
1. Lana and I met with patient by himself early in the afternoon. He continues to show decline with generalized weakness, poor p.o. intake, and ongoing nausea. He has been somewhat reluctant to take medications because he felt like some of those were causing some of the symptoms. We again reviewed that his x-ray today continues to show an ileus. We feel a lot of his symptoms are related more to his cancer then to his postoperative ileus. Encouraged him these medication so has not to suffer during this time. Attempted to have a more goals of care discussion but he was reluctant because he supposed to Skype his nephew's wedding later this afternoon. 2. Later, his wife came to the room and wanted to have further information. I reviewed again with her that we are in a very difficult situation where his bowel function is not returning and we think this is more related to the extensive cancer in his abdomen. We are going ultimately need to have a discussion about next steps and if he really wants to get home, we may need to arrange that sooner than later.   His wife then followed me out of the room and basically said he is in denial and really not attempting to have any discussions about goals and his cancer. She understands that we are potentially getting closer to the end and she really wants him comfortable. Discussed hospice at home versus inpatient hospice and based on his symptoms and needs, we will make either arrangement. Our plan at this time is to regroup on Monday and hopefully be able to come to a consensus on next steps. 3. Goals of care attempted to have an extended conversation with the patient about his goals but see my note from above. He was reluctant to really have any extensive conversations given his nephew's wedding. 4. Advance care planning he has completed an advanced medical directive. He has assigned his wife as primary medical power of  and his sister at secondary medical power of . I have asked his family to bring a copy to the hospital. Sister will bring 5. CODE STATUS did not address CODE STATUS again today. We have addressed this almost daily this week. 6. Symptom management his nausea and vomiting has been persistent. I am going to start a BAD pump today which is a combination of Benadryl, Decadron, and Ativan. We will see if this helps his symptoms related to cancer associated nausea vomiting. 7. Discussed with . If he were to decline significantly over the weekend, please call her hospice inpatient team for admission. 8. Discussed with bedside nurse and Vi(hospice liaison) 9. Initial consult note routed to primary continuity provider 10. Communicated plan of care with: Palliative IDT 
 
 
 GOALS OF CARE / TREATMENT PREFERENCES:  
[====Goals of Care====] GOALS OF CARE: 
Patient/Health Care Proxy Stated Goals: Prolong life TREATMENT PREFERENCES:  
Code Status: Full Code Advance Care Planning: 
Advance Care Planning 4/26/2019 Patient's Healthcare Decision Maker is: Verbal statement (Legal Next of Kin remains as decision maker) Confirm Advance Directive Yes, not on file Medical Interventions: Full interventions Other Instructions:  
Artificially Administered Nutrition: Feeding tube long-term, if indicated The palliative care team has discussed with patient / health care proxy about goals of care / treatment preferences for patient. 
[====Goals of Care====] HISTORY:  
 
History obtained from: Chart, spouse, sister CHIEF COMPLAINT: Rectal pain HPI/SUBJECTIVE: The patient is:  
[x] Verbal and participatory [] Non-participatory due to:  
Patient is feeling much better. He has had a thoracentesis that removed 2 L of fluid. Feels like his breathing is much improved. 4/17 patient is just returned from the operating room and is still a little bit lethargic. 4/18-awake and alert. Still pain in the abdomen 4/19-pain is better. Lots of blood in the pleur-x drain-did contact pulmonary via bedside nurse 4/22-pain seems to be improved. Less discomfort over the Pleurx drain. 4/23 feels like he is tolerating small amounts of liquids. Pain seems less. 4/24 patient sleeping. Had a small bowel follow-through earlier today. Did not feel like we needed to wake him up. 
 
4/25-patient is awake and alert. Eating very little no significant pain at this time. 4/26 patient appears weaker. He still having persistent nausea and vomiting. He has been unable to really eat or drink anything except water. Clinical Pain Assessment (nonverbal scale for severity on nonverbal patients):  
Clinical Pain Assessment Severity: 3 Location: Anal area Character: Throbbing and aching Duration: Months Effect: Difficult to have a bowel movement Factors: Constipation Frequency: Intermittent Adult Nonverbal Pain Scale Face: No particular expression or smile Activity (Movement): Lying quietly, normal position Guarding: Lying quietly, no positioning of hands over areas of body Physiology (Vital Signs): Stable vital signs Respiratory: Baseline RR/SpO2 compliant with ventilator Total Score: 0 
 
 
 FUNCTIONAL ASSESSMENT:  
 
Palliative Performance Scale (PPS): PPS: 70 PSYCHOSOCIAL/SPIRITUAL SCREENING:  
 
Advance Care Planning: 
Advance Care Planning 4/26/2019 Patient's Healthcare Decision Maker is: Verbal statement (Legal Next of Kin remains as decision maker) Confirm Advance Directive Yes, not on file Any spiritual / Voodoo concerns: 
[] Yes /  [x] No 
 
Caregiver Burnout: 
[] Yes /  [x] No /  [] No Caregiver Present Anticipatory grief assessment:  
[x] Normal  / [] Maladaptive ESAS Anxiety: Anxiety: 0 
 
ESAS Depression: Depression: 0 REVIEW OF SYSTEMS:  
 
Positive and pertinent negative findings in ROS are noted above in HPI. The following systems were [x] reviewed / [] unable to be reviewed as noted in HPI Other findings are noted below. Systems: constitutional, ears/nose/mouth/throat, respiratory, gastrointestinal, genitourinary, musculoskeletal, integumentary, neurologic, psychiatric, endocrine. Positive findings noted below. Modified ESAS Completed by: provider Fatigue: 2 Drowsiness: 0 Depression: 0 Pain: 3 Anxiety: 0 Nausea: 0 Anorexia: 3 Dyspnea: 4 Constipation: Yes Stool Occurrence(s): 1 PHYSICAL EXAM:  
 
From RN flowsheet: 
Wt Readings from Last 3 Encounters:  
04/12/19 144 lb (65.3 kg) 06/09/14 192 lb (87.1 kg) 04/09/14 193 lb 6.4 oz (87.7 kg) Blood pressure 125/85, pulse (!) 117, temperature 97.6 °F (36.4 °C), resp. rate 18, height 5' 9\" (1.753 m), weight 144 lb (65.3 kg), SpO2 98 %. Pain Scale 1: Numeric (0 - 10) Pain Intensity 1: 6 Pain Onset 1: chronic Pain Location 1: Abdomen Pain Orientation 1: Anterior Pain Description 1: Aching Pain Intervention(s) 1: Medication (see MAR) Last bowel movement, if known:  
 
Constitutional: Thin, alert and oriented, appears tired Eyes: pupils equal, anicteric ENMT: no nasal discharge, moist mucous membranes Cardiovascular: regular rhythm, distal pulses intact Respiratory: breathing not labored, decreased on the right Gastrointestinal: soft, PEG in place, ileostomy noted, Mild diffuseTTP Musculoskeletal: no deformity, no tenderness to palpation Skin: warm, dry Neurologic: following commands, moving all extremities Psychiatric: Flat affect, no hallucinations Other: 
 
 
 HISTORY:  
 
Active Problems: Dyspnea (2019) Past Medical History:  
Diagnosis Date  Cancer [facility])   
 esophagus cancer, right lung cancer  Chronic obstructive pulmonary disease [facility]  Diabetes ([facility]  Hypertension  Sleep apnea   
 no cpap Past Surgical History:  
Procedure Laterality Date  COLONOSCOPY N/A 2019 COLONOSCOPY performed by Christian Paulino [facility] at [facility] HX ENDOSCOPY    
 w/ PEG tube  HX HEENT    
 14years old had eye surgery  IR INSERT CATH PLEURAL INDWELL  2019 Family History Problem Relation Age of Onset  Heart Disease Mother  Hypertension Mother  Heart Disease Brother  Heart Disease Maternal Grandmother  Heart Disease Paternal Grandmother History reviewed, no pertinent family history. Social History Tobacco Use  Smoking status: Former Smoker Last attempt to quit: 2018 Years since quittin.2  Smokeless tobacco: Never Used Substance Use Topics  Alcohol use: No  
 
Allergies Allergen Reactions  Codeine Other (comments)  
  hallucinations  Pcn [Penicillins] Hives States \"it's a childhood allergy\" Current Facility-Administered Medications Medication Dose Route Frequency  levalbuterol (XOPENEX) nebulizer soln 1.25 mg/3 mL  1.25 mg Nebulization Q6HWA RT  
 prochlorperazine (COMPAZINE) injection 5 mg  5 mg IntraVENous Q4H PRN  
 LORazepam 8000 mcg,diphen 200 mg,dexam 13.32 mg in 50 mL total vol NS PCA   IntraVENous CONTINUOUS  
 dextrose 5 % - 0.45% NaCl infusion  100 mL/hr IntraVENous CONTINUOUS  
 aluminum-magnesium hydroxide (MAALOX) oral suspension 30 mL  30 mL Oral Q6H PRN  
 morphine injection 4 mg  4 mg IntraVENous Q3H PRN  
 lidocaine (PF) (XYLOCAINE) 10 mg/mL (1 %) injection 0.1 mL  0.1 mL SubCUTAneous PRN  
 sodium chloride (NS) flush 5-40 mL  5-40 mL IntraVENous Q8H  
 sodium chloride (NS) flush 5-40 mL  5-40 mL IntraVENous PRN  
 acetaminophen (TYLENOL) tablet 650 mg  650 mg Oral Q4H PRN  
 ondansetron (ZOFRAN) injection 4 mg  4 mg IntraVENous Q4H PRN  
 oxyCODONE (ROXICODONE INTENSOL) 20 mg/mL concentrated solution 5 mg  5 mg Oral Q4H PRN  
 sertraline (ZOLOFT) tablet 50 mg  50 mg Oral DAILY  
 
 
 
 LAB AND IMAGING FINDINGS:  
 
Lab Results Component Value Date/Time WBC 12.8 (H) 04/25/2019 04:33 AM  
 HGB 10.3 (L) 04/25/2019 04:33 AM  
 PLATELET 419 53/13/2788 04:33 AM  
 
Lab Results Component Value Date/Time Sodium 142 04/26/2019 06:46 AM  
 Potassium 4.0 04/26/2019 06:46 AM  
 Chloride 110 (H) 04/26/2019 06:46 AM  
 CO2 25 04/26/2019 06:46 AM  
 BUN 49 (H) 04/26/2019 06:46 AM  
 Creatinine 2.46 (H) 04/26/2019 06:46 AM  
 Calcium 8.3 (L) 04/26/2019 06:46 AM  
 Magnesium 1.7 04/25/2019 04:33 AM  
 Phosphorus 3.0 04/23/2019 05:40 AM  
  
Lab Results Component Value Date/Time AST (SGOT) 12 (L) 04/23/2019 05:40 AM  
 Alk. phosphatase 56 04/23/2019 05:40 AM  
 Protein, total 4.8 (L) 04/23/2019 05:40 AM  
 Albumin 1.9 (L) 04/23/2019 05:40 AM  
 Globulin 2.9 04/23/2019 05:40 AM  
 
No results found for: INR, PTMR, PTP, PT1, PT2, APTT No results found for: IRON, FE, TIBC, IBCT, PSAT, FERR No results found for: PH, PCO2, PO2 No components found for: [place] No results found for: CPK, CKMB Total time: 35 
Counseling / coordination time, spent as noted above: 30 
> 50% counseling / coordination?: yes Prolonged service was provided for  []30 min   []75 min in face to face time in the presence of the patient, spent as noted above. Time Start:  
Time End:  
Note: this can only be billed with 21903 (initial) or 42744 (follow up). If multiple start / stop times, list each separately.

## 2019-04-26 NOTE — PROGRESS NOTES
Bedside shift change report given to Eric Solorio (oncoming nurse) by Bere Nassar (offgoing nurse). Report included the following information SBAR and Kardex.

## 2019-04-26 NOTE — PROGRESS NOTES
04/26/19 0023 Chart and Patient  Assessment Pulmonary History 0 Surgical History 1 Chest X-ray 2 Respiratory Pattern 0 Mental Status 0 Breath Sounds 2 Cough 0 Level of Activity/Mobility 1 Respiratory Assessment Total Score 6

## 2019-04-26 NOTE — PROGRESS NOTES
Palliative Medicine Code Status: Full Code Advance Care Planning: 
Advance Care Planning 4/26/2019 Patient's Healthcare Decision Maker is: Verbal statement (Legal Next of Kin remains as decision maker): Wife [facility] [phone] Confirm Advance Directive Yes, not on file Patient / Family Encounter Documentation Participants (names): Pt, Palliative Medicine (Dr. Steffi Lewis, [address] [facility]) Narrative:  Pt was awake in bed, no family currently at bedside. Pt reports wife Son Waller is en route, stated they plan to Skype with family at 4pm so pt can watch his nephew's wedding ceremony at [place] [address].  Pt continues to have nausea/vomiting, stated he can keep water down but nothing else. Dr. Steffi Lewis spoke frankly with pt re: concerns about pt's condition, offered to return when wife is present to discuss options for care moving forward. Psychosocial Issues Identified/ Resilience Factors: Coping with advanced illness and associated symptoms. Pt does have support from family. Goals of Care / Plan: Pt requested to delay further goals of care discussions until later date, wished to focus today on the luis f of his nephew's wedding, which pt is hoping to view via Skype. Palliative Medicine discussed with RN. Will continue to follow for support. Thank you for including Palliative Medicine in the care of Mr. Ivette Hampton. Kristi Gonzalez LCSW, [facility]- 
[phone])

## 2019-04-27 PROBLEM — F41.8 ANXIETY WITH DEPRESSION: Status: ACTIVE | Noted: 2019-01-01

## 2019-04-27 PROBLEM — Z85.118 HISTORY OF LUNG CANCER: Status: ACTIVE | Noted: 2019-01-01

## 2019-04-27 PROBLEM — C18.9 ADENOCARCINOMA OF COLON (HCC): Status: ACTIVE | Noted: 2019-01-01

## 2019-04-27 PROBLEM — J90 PLEURAL EFFUSION ON RIGHT: Status: ACTIVE | Noted: 2019-01-01

## 2019-04-27 PROBLEM — R13.10 DYSPHAGIA: Status: ACTIVE | Noted: 2019-01-01

## 2019-04-27 PROBLEM — Z85.01 HISTORY OF ESOPHAGEAL CANCER: Status: ACTIVE | Noted: 2019-01-01

## 2019-04-27 PROBLEM — E43 SEVERE PROTEIN-CALORIE MALNUTRITION (HCC): Status: ACTIVE | Noted: 2019-01-01

## 2019-04-27 PROBLEM — N17.9 AKI (ACUTE KIDNEY INJURY) (HCC): Status: ACTIVE | Noted: 2019-01-01

## 2019-04-27 PROBLEM — C79.9 METASTATIC ADENOCARCINOMA (HCC): Status: ACTIVE | Noted: 2019-01-01

## 2019-04-27 PROBLEM — K62.4 ANAL STENOSIS: Status: ACTIVE | Noted: 2019-01-01

## 2019-04-27 PROBLEM — N18.4 CKD (CHRONIC KIDNEY DISEASE) STAGE 4, GFR 15-29 ML/MIN (HCC): Status: ACTIVE | Noted: 2019-01-01

## 2019-04-27 PROBLEM — R53.1 WEAKNESS GENERALIZED: Status: ACTIVE | Noted: 2019-01-01

## 2019-04-27 PROBLEM — R13.19 ESOPHAGEAL DYSPHAGIA: Status: ACTIVE | Noted: 2019-01-01

## 2019-04-27 NOTE — PROGRESS NOTES
Bedside and Verbal shift change report given to [address] (oncoming nurse) by Maren Bass (offgoing nurse). Report included the following information SBAR and Kardex.

## 2019-04-27 NOTE — PROGRESS NOTES
[facility] 
[address] [facility], [place], [address] [place] [address] 
[phone] Medical Progress Note NAME:         Claudine Barragan :        1970 MRM:        [id number] Date:          2019 Subjective: Patient has been seen and examined as a follow up for multiple medical issues. Chart, labs, diagnostics reviewed. He remains weak. Intermittently confused. Not eating. Abdomen remains distended. Discussed with his spouse and nurse for collaborative hx Objective: 
 
Vital Signs: 
 
Visit Vitals /73 (BP 1 Location: Right arm, BP Patient Position: At rest) Pulse (!) 114 Temp 97.7 °F (36.5 °C) Resp 18 Ht 5' 9\" (1.753 m) Wt 65.3 kg (144 lb) SpO2 95% BMI 21.27 kg/m² Intake/Output Summary (Last 24 hours) at 2019 1106 Last data filed at 2019 4573 Gross per 24 hour Intake 160 ml Output 275 ml Net -115 ml Physical Examination: 
 
General:   Weak, cachectic and ill looking although not in acute distress Eyes:   pink conjunctivae, PERRLA with no discharge. ENT:   no ottorrhea or rhinorrhea with dry mucous membranes Neck: no masses, thyroid non-tender and trachea central. 
Pulm:  no accessory muscle use, decreased right breath sounds without crackles or wheezes. + Pleurex cath right 
Card:  no JVD or murmurs, has sinus tachycardia, without thrills, bruits or peripheral edema Abd:  firm, tender, distended, minimal bowel sounds with no output from ostomy Musc:  No cyanosis, clubbing, atrophy or deformities. Skin:  No rashes, bruising or ulcers. Neuro: Awake and alert but confused. Remains weak. Moves all extremities Psych:  Has little insight to his illness Current Facility-Administered Medications Medication Dose Route Frequency  levalbuterol (XOPENEX) nebulizer soln 1.25 mg/3 mL  1.25 mg Nebulization Q6HWA RT  
  prochlorperazine (COMPAZINE) injection 5 mg  5 mg IntraVENous Q4H PRN  
 LORazepam 8000 mcg,diphen 200 mg,dexam 13.32 mg in 50 mL total vol NS PCA   IntraVENous CONTINUOUS  
 dextrose 5 % - 0.45% NaCl infusion  100 mL/hr IntraVENous CONTINUOUS  
 aluminum-magnesium hydroxide (MAALOX) oral suspension 30 mL  30 mL Oral Q6H PRN  
 morphine injection 4 mg  4 mg IntraVENous Q3H PRN  
 lidocaine (PF) (XYLOCAINE) 10 mg/mL (1 %) injection 0.1 mL  0.1 mL SubCUTAneous PRN  
 sodium chloride (NS) flush 5-40 mL  5-40 mL IntraVENous Q8H  
 sodium chloride (NS) flush 5-40 mL  5-40 mL IntraVENous PRN  
 acetaminophen (TYLENOL) tablet 650 mg  650 mg Oral Q4H PRN  
 ondansetron (ZOFRAN) injection 4 mg  4 mg IntraVENous Q4H PRN  
 oxyCODONE (ROXICODONE INTENSOL) 20 mg/mL concentrated solution 5 mg  5 mg Oral Q4H PRN  
 sertraline (ZOLOFT) tablet 50 mg  50 mg Oral DAILY Laboratory data and review: 
 
Recent Labs  
  04/25/19 
5207 WBC 12.8* HGB 10.3* HCT 32.9*  
 Recent Labs  
  04/26/19 
8490 04/25/19 
1461  143  
K 4.0 4.2 * 112* CO2 25 23 * 180* BUN 49* 49* CREA 2.46* 2.58* CA 8.3* 8.4* MG  --  1.7 No components found for: [place] Other Diagnostics:  
 
Recent Xray abdomen - Small bowel obstruction. Very little if any small bowel barium 
progression. Assessment and Plan: Metastatic adenocarcinoma ([address] (4/27/2019) /  Adenocarcinoma of colon ([address] (4/27/2019) / Anal stenosis (4/27/2019) POA: patient had been seen by GI and had a colonoscopy for anal pain at which point anal stenosis was noted and path was likely a false neg test. Seen by general surgery and had an ex lap done 4/17. Partially obstructing hepatic flexure mass noted with diffuse carcinomatosis and an omental patch path showed a metastatic adenocarcinoma confirmed on ascites fluid cytology. Serial abdominal X rays have shown protracted SBO with little barium progression.  There is minimal ileostomy output. Patient has been reviewed by palliative care and will likely transition to hospice but patient still not on board. Continue supportive care for now and monitor Pleural effusion on right (4/27/2019) / History of lung cancer (4/27/2019) / Dyspnea (4/12/2019) POA: CT chest showed a large right pleural effusion with a BERNARDO lung nodule concerning for metastasis. Effusion likely malignant. Seen by IR and now has tunneled long-term right-sided pleural drainage catheter. Fluid being drained on TTS for comfort Esophageal cancer / Esophageal stricture / Esophageal dysphagia (4/27/2019) / Nausea / vomiting / abdominal pain POA: esophageal stricture noted on EGD during PEG placement. Now with pSBO vs Ileus noted on KUB with little progress. Tolerated diverting ileostomy 4/15 but now with minimal output. Surgery following. Continue clears as tolerated then PEG feedings, if and when pSBO resolves although this seems less likely. Palliative care following. Continue to monitor. HERMINIA (acute kidney injury) ([address] (4/27/2019) / CKD (chronic kidney disease) stage 4, GFR 15-29 ml/min ([facility] (4/27/2019): now with progressively worsening renal function due to poor intake. Likely hospice soon. Severe protein-calorie malnutrition ([address] (4/27/2019): multifactorial from metastatic cancer, poor intake from pSBO. Cannot be started on tube feeding due to obstruction. Continue supportive care for now. Anxiety and depression: Continue Zoloft. Total time spent for the patient's care: 35 Minutes Care Plan discussed with: Family, Nursing Staff and Consultant/Specialist 
 
Discussed:  Care Plan and D/C Planning Prophylaxis: SCDs Anticipated Disposition:  likely hospice 
        
___________________________________________________ Attending Physician:   Leif Munoz MD

## 2019-04-27 NOTE — HOSPICE
[employer] [facility] Those in [facility] 
[phone] Patient Name: Aileen Alcazar YOB: 1970 Age: 44 y.o. Kennedy Garduno RN Note: Chart reviewed. Plan of care discussed with patients nurse. Met with pt's wife Katlin Lake in a private room per request. She is hoping to have patient transition to hospice service and wishes to discuss with his sister who is coming later. She is tearful during entire info session and acknowleges that her  doesn't want to leave her. They have been  for one year. Discussed Hospice philosophy, services. Gave her brochure of [facility] and she says that would be ideal as pt could see his two dogs again. DDNR left for them to review as this is required for Hospice House. Family will call when they are ready. Thank you for the opportunity to be of service to this patient.  
 
María Moore RN [facility]

## 2019-04-27 NOTE — PROGRESS NOTES
visit for routine follow up. Patient resting quietly in bed. Awakened to conversation in room. Wife at bedside. Spoke to wife concerning present thoughts, feelings, and concerns. Tearful at times, says it appears her 's condition is worsening and she has difficulty at times and is expressing anticipatory grief. She says she is not in need of anything at this time. The patient states that he is feeling okay today except that he is tired. He says he is not having any pain at this time. He did not identify any particular spiritual needs and says he is not in need of anything at this time. Both appeared comforted as a result of this visit and expressed gratitude for this visit. Will continue to follow up as needed and upon request as able. Visited by Rev. Isi Retana, [facility]  paging service: [phone])

## 2019-04-27 NOTE — PROGRESS NOTES
General Surgery Daily Progress Note Patient: Maxi Castle MRN: [id number]  SSN: [id number] YOB: 1970  Age: 44 y.o. Sex: male Admit Date: 4/12/2019 Subjective:  
Some ostomy output Denies nausea Taking in some clears Current Facility-Administered Medications Medication Dose Route Frequency  levalbuterol (XOPENEX) nebulizer soln 1.25 mg/3 mL  1.25 mg Nebulization Q6HWA RT  
 prochlorperazine (COMPAZINE) injection 5 mg  5 mg IntraVENous Q4H PRN  
 LORazepam 8000 mcg,diphen 200 mg,dexam 13.32 mg in 50 mL total vol NS PCA   IntraVENous CONTINUOUS  
 dextrose 5 % - 0.45% NaCl infusion  100 mL/hr IntraVENous CONTINUOUS  
 aluminum-magnesium hydroxide (MAALOX) oral suspension 30 mL  30 mL Oral Q6H PRN  
 morphine injection 4 mg  4 mg IntraVENous Q3H PRN  
 lidocaine (PF) (XYLOCAINE) 10 mg/mL (1 %) injection 0.1 mL  0.1 mL SubCUTAneous PRN  
 sodium chloride (NS) flush 5-40 mL  5-40 mL IntraVENous Q8H  
 sodium chloride (NS) flush 5-40 mL  5-40 mL IntraVENous PRN  
 acetaminophen (TYLENOL) tablet 650 mg  650 mg Oral Q4H PRN  
 ondansetron (ZOFRAN) injection 4 mg  4 mg IntraVENous Q4H PRN  
 oxyCODONE (ROXICODONE INTENSOL) 20 mg/mL concentrated solution 5 mg  5 mg Oral Q4H PRN  
 sertraline (ZOLOFT) tablet 50 mg  50 mg Oral DAILY Objective:  
04/27 0701 - 04/27 1900 In: 160 [I.V.:160] Out: -  
04/25 1901 - 04/27 0700 In: 3648.2 [I.V.:3648.2] Out: 475 [Urine:200] Patient Vitals for the past 8 hrs: 
 BP Temp Pulse Resp SpO2  
04/27/19 0810 106/73 97.7 °F (36.5 °C) (!) 114 18 95 % Physical Exam: 
General: Alert, cooperative, NAD Abdomen: Soft, appropriately   TTP. Stool in ostomy Extremities: Warm, moves all, no edema Skin:  Warm and dry, no rash Labs:  
Recent Labs  
  04/25/19 
2164 WBC 12.8* HGB 10.3* HCT 32.9*  
 Recent Labs  
  04/26/19 
3526 04/25/19 
9748  143  
K 4.0 4.2 * 112* CO2 25 23 * 180* BUN 49* 49* CREA 2.46* 2.58* CA 8.3* 8.4* MG  --  1.7 Assessment / Plan:  
· Continue clears as tolerated · Continue follow up with palliative care

## 2019-04-27 NOTE — ROUTINE PROCESS
Bedside and Verbal shift change report given to Laura (oncoming nurse) by Guillaume Land (offgoing nurse). Report included the following information SBAR, Kardex, Intake/Output, MAR, Accordion and Recent Results.

## 2019-04-27 NOTE — PROGRESS NOTES
Problem: Falls - Risk of 
Goal: *Absence of Falls Outcome: Progressing Towards Goal 
  
Problem: Patient Education: Go to Patient Education Activity Goal: Patient/Family Education Outcome: Progressing Towards Goal 
  
Problem: Diabetes Self-Management Goal: *Insulin pump training Outcome: Progressing Towards Goal 
Goal: *Sick day guidelines Outcome: Progressing Towards Goal 
Goal: *Patient Specific Goal (EDIT GOAL, INSERT TEXT) Outcome: Progressing Towards Goal 
  
Problem: Patient Education: Go to Patient Education Activity Goal: Patient/Family Education Outcome: Progressing Towards Goal 
  
Problem: Pressure Injury - Risk of 
Goal: *Prevention of pressure injury Outcome: Progressing Towards Goal 
  
Problem: Patient Education: Go to Patient Education Activity Goal: Patient/Family Education Outcome: Progressing Towards Goal 
  
Problem: Pain Goal: *Control of Pain Outcome: Progressing Towards Goal 
  
Problem: Patient Education: Go to Patient Education Activity Goal: Patient/Family Education Outcome: Progressing Towards Goal 
  
Problem: Nausea/Vomiting (Adult) Goal: *Absence of nausea/vomiting Outcome: Progressing Towards Goal 
  
Problem: Patient Education: Go to Patient Education Activity Goal: Patient/Family Education Outcome: Progressing Towards Goal

## 2019-04-28 NOTE — PROGRESS NOTES
[facility] Liaison note: 
 
Spoke with wife and patient today at bedside. Family interested in hospice house however [facility] is not yet available there as I checked with admin on call. Discussed that patient would be limited to a three week stay, and potential cost if he were to become routine. Wife stated she did not know that and would need time to think it over. Also discussed returning home with patient but she says that is not an option as she cannot be there 24 hours a day. Patient appears comfortable in bed, receiving IV fluids, verbal. Symptoms appear to be managed. We can reassess patient tomorrow and follow up with intake department to run his insurance to make certain there are no co-pays. Patient remains a full code but this does not interfere with his ability to choose hospice care. Informed wife that the hospice house requires a disposition (LOS restricted to max three weeks) and an identified  home. She appeared a bit overwhelmed today, last night she did not get much sleep. Thank you, 
 
Marcela Ivan RN [facility]

## 2019-04-28 NOTE — PROGRESS NOTES
[address] 
[address] [facility], [address] [place] [address] 
[phone] Medical Progress Note NAME:         Nora Herrmann :        1970 MRM:        [id number] Date:          2019 Subjective: Patient has been seen and examined as a follow up for multiple medical issues. Chart, labs, diagnostics reviewed. He remains weak and confused today. Still not eating. Abdomen remains distended. Discussed with his spouse and nurse for collaborative hx Objective: 
 
Vital Signs: 
 
Visit Vitals /65 (BP 1 Location: Left arm, BP Patient Position: At rest) Pulse (!) 104 Temp 97.4 °F (36.3 °C) Resp 18 Ht 5' 9\" (1.753 m) Wt 65.3 kg (144 lb) SpO2 93% BMI 21.27 kg/m² Intake/Output Summary (Last 24 hours) at 2019 1001 Last data filed at 2019 5422 Gross per 24 hour Intake 1300 ml Output 1150 ml Net 150 ml Physical Examination: 
 
General:   Weak, cachectic and remains very ill. Eyes:   pink conjunctivae, PERRLA with no discharge. ENT:   no ottorrhea or rhinorrhea with dry mucous membranes Neck: no masses, thyroid non-tender and trachea central. 
Pulm: decreased right breath sounds without crackles or wheezes. + Pleurex cath right 
Card:  no JVD or murmurs, has sinus tachycardia, without thrills, bruits or peripheral edema Abd:  firm, tender, distended, minimal bowel sounds still with no output from ostomy Musc:  No cyanosis, clubbing, atrophy or deformities. Skin:  No rashes, bruising or ulcers. Neuro: Awake and alert but confused. Remains weak. Moves all extremities Psych:  Has little insight to his illness Current Facility-Administered Medications Medication Dose Route Frequency  levalbuterol (XOPENEX) nebulizer soln 1.25 mg/3 mL  1.25 mg Nebulization Q6HWA RT  
 prochlorperazine (COMPAZINE) injection 5 mg  5 mg IntraVENous Q4H PRN  
  LORazepam 8000 mcg,diphen 200 mg,dexam 13.32 mg in 50 mL total vol NS PCA   IntraVENous CONTINUOUS  
 dextrose 5 % - 0.45% NaCl infusion  100 mL/hr IntraVENous CONTINUOUS  
 aluminum-magnesium hydroxide (MAALOX) oral suspension 30 mL  30 mL Oral Q6H PRN  
 morphine injection 4 mg  4 mg IntraVENous Q3H PRN  
 lidocaine (PF) (XYLOCAINE) 10 mg/mL (1 %) injection 0.1 mL  0.1 mL SubCUTAneous PRN  
 sodium chloride (NS) flush 5-40 mL  5-40 mL IntraVENous Q8H  
 sodium chloride (NS) flush 5-40 mL  5-40 mL IntraVENous PRN  
 acetaminophen (TYLENOL) tablet 650 mg  650 mg Oral Q4H PRN  
 ondansetron (ZOFRAN) injection 4 mg  4 mg IntraVENous Q4H PRN  
 oxyCODONE (ROXICODONE INTENSOL) 20 mg/mL concentrated solution 5 mg  5 mg Oral Q4H PRN  
 sertraline (ZOLOFT) tablet 50 mg  50 mg Oral DAILY Laboratory data and review: No results for input(s): WBC, HGB, HCT, PLT, HGBEXT, HCTEXT, PLTEXT, HGBEXT, HCTEXT, PLTEXT in the last 72 hours. Recent Labs  
  04/26/19 
3751   
K 4.0  
* CO2 25 * BUN 49* CREA 2.46* CA 8.3* No components found for: [place] Other Diagnostics:  
 
Recent Xray abdomen - Small bowel obstruction. Very little if any small bowel barium 
progression. Assessment and Plan: Metastatic adenocarcinoma ([facility] [address] (4/27/2019) /  Adenocarcinoma of colon ([address] (4/27/2019) / Anal stenosis (4/27/2019) POA: patient had been seen by GI and had a colonoscopy for anal pain at which point anal stenosis was noted and path was likely a false neg test. Seen by general surgery and had an ex lap done 4/17. Partially obstructing hepatic flexure mass noted with diffuse carcinomatosis and an omental patch path showed a metastatic adenocarcinoma confirmed on ascites fluid cytology. Serial abdominal X rays have shown protracted SBO with little barium progression. There is minimal ileostomy output.  Patient has been reviewed by palliative care and hospice. Wife open to hospice house. Will ask hospice to review and continue supportive care for now and monitor Pleural effusion on right (4/27/2019) / History of lung cancer (4/27/2019) / Dyspnea (4/12/2019) POA: CT chest showed a large right pleural effusion with a BERNARDO lung nodule concerning for metastasis. Effusion likely malignant. Seen by IR and now has tunneled long-term right-sided pleural drainage catheter. Fluid being drained on TTS for comfort. Stable Esophageal cancer / Esophageal stricture / Esophageal dysphagia (4/27/2019) / Nausea / vomiting / abdominal pain POA: esophageal stricture noted on EGD during PEG placement. Now with pSBO vs Ileus noted on KUB with little progress. Tolerated diverting ileostomy 4/15 but now with minimal output. Surgery following. Continue clears as tolerated then PEG feedings, if and when pSBO resolves although this seems less likely. Palliative care and hospice monitoring. Continue to monitor. HERMINIA (acute kidney injury) ([address] (4/27/2019) / CKD (chronic kidney disease) stage 4, GFR 15-29 ml/min (HCC) (4/27/2019): now with progressively worsening renal function due to poor intake. Likely hospice soon. No further testing Severe protein-calorie malnutrition ([address] (4/27/2019): multifactorial from metastatic cancer, poor intake from pSBO. Cannot be started on tube feeding due to obstruction. Continue supportive care for now. Anxiety and depression: Continue Zoloft. Total time spent for the patient's care: 30  Minutes Care Plan discussed with: Family, Nursing Staff Discussed:  Care Plan and D/C Planning Prophylaxis: SCDs Anticipated Disposition:  likely hospice 
        
___________________________________________________ Attending Physician:   Newton Leal MD

## 2019-04-28 NOTE — PROGRESS NOTES
RN notes that pt. Is reaching out and grabbing the air in front of him. RN asks pt what he is grabbing. Pt. States things he doesn't see. SpO2 90%, -120s, and BP 99/70. RN notifies Dr. Haile Dawn. Will continue to monitor. 1530:Pt. Agitated. Pt. Pulled out IV, ileostomy, pleurex dressing and port dressing. RN notifies Dr. Haile Dawn. MD states to give 2mg Ativan IV once. Will continue to monitor. 1900: Pt. continuing to pull at lines. He has pulled off pleurex dressing, and ostomy bag. RN and wife attempt to calm pt. Down with no success. Pt is continuing to fidget with lines, and grab the air in front of him. RN notifies Dr. Domingo Payne. MD states to get ekg prior to giving 1mg haldol. EKG done. . MD notified. MD states to give 1mg haldol. Will continue to monitor.

## 2019-04-28 NOTE — PROGRESS NOTES
Paged by nursing re: agitation, pulling out lines and tubes, confusion. On PCA Ativan for nausea. Given add'l IV Ativan earlier without improvement in his agitation. Chart reviewed. Unfortunate 45 yo gentleman with metastatic adenocarcinoma, esophogeal CA, lung CA who underwent ex-lap, with ongoing SBO, s/p PleurX for pleural effusion and now with probable plans for hospice. Suspect his s/sx is multifactorial, including delirium. For his own safety will try Haldol PRN.

## 2019-04-28 NOTE — PROGRESS NOTES
Pt. Needs IV access for fluids. RN notes that pt. Has a port that is not accessed. RN notifies Dr. Samara Gallo. MD states to access port. Will continue to monitor.

## 2019-04-28 NOTE — PROGRESS NOTES
Problem: Falls - Risk of 
Goal: *Absence of Falls Outcome: Progressing Towards Goal 
  
Problem: Patient Education: Go to Patient Education Activity Goal: Patient/Family Education Outcome: Progressing Towards Goal 
  
Problem: Pressure Injury - Risk of 
Goal: *Prevention of pressure injury Outcome: Progressing Towards Goal 
  
Problem: Patient Education: Go to Patient Education Activity Goal: Patient/Family Education Outcome: Progressing Towards Goal 
  
Problem: Pain Goal: *Control of Pain Outcome: Progressing Towards Goal 
  
Problem: Patient Education: Go to Patient Education Activity Goal: Patient/Family Education Outcome: Progressing Towards Goal 
  
Problem: Nausea/Vomiting (Adult) Goal: *Absence of nausea/vomiting Outcome: Progressing Towards Goal 
  
Problem: Patient Education: Go to Patient Education Activity Goal: Patient/Family Education Outcome: Progressing Towards Goal

## 2019-04-28 NOTE — PROGRESS NOTES
Bedside and Verbal shift change report given to [address] (oncoming nurse) by Júnior Collins (offgoing nurse). Report included the following information SBAR and Kardex.

## 2019-04-29 PROBLEM — C19 COLORECTAL CANCER (HCC): Status: ACTIVE | Noted: 2019-01-01

## 2019-04-29 NOTE — [facility] NOTES
Patient: Vincent Mijares Date: 04/29/19 Time: 6:09 PM 
 
 is visiting for an initial, hospice, spiritual assessment.  met with patient's wife and sister. I stepped outside with family, as staff were busy bathing Kinza Card at the time. His wife and sister shared about his decline and admission to hospice. They shared about their anticipatory grief and sense that his dying is coming too soon. They talked through his adam and how he has sought to give other people hope as he has been going through his treatments. His [employer]ism adam as been instrumental for this, where he has given other's hand carved crosses he purchased with the desire to provide others with hope, while he continues to maintain his hope in God. His wife processed her grief and shared she has her moments, but has friends and pastors who have been very supportive of she and Kinza Card.  provided prayer with them outside patient's room. Interventions: Spiritual assessment, pastoral presence allowing them to process their anticipatory grief, and prayer. Goals for the next two weeks: 1 week 2, 2 PRN; for pastoral support aiding family grief process and encouraging adam for coping. Signed by: Yolanda Puentes

## 2019-04-29 NOTE — H&P
[facility] to Those in Need 
[phone] Patient Name: Philip Ferguson YOB: 1970 Date of Provider Hospice Visit: 04/29/19 Level of Care:   [x] General Inpatient (GIP)    [] Routine   [] Respite Current Location of Care: 
[] [facility] [x] [facility] [] [facility] [] [facility] [] [facility]ospice [facility], patient referred from: 
[] [facility] [] [facility] [] [facility] [] [facility] [] Home [] Other:  
 
Date of Original Hospice Admission: 4/29/2019 Hospice Medical Director at time of admission: Javi Cuenca Principle Hospice Diagnosis: Metastatic colorectal cancer Diagnoses RELATED to the terminal prognosis: Esophageal cancer, lung cancer, small bowel obstruction, severe protein malnutrition Other Diagnoses: Poppy Ferguson is a 44y.o. year old who was admitted to [facility]. Mr. Raymond Mina is an unfortunate 57-year-old gentleman with prior diagnosis of esophageal and lung cancer. He presented to the hospital on 4/12/19 because of what \"anal \"pain. He was seen by Dr. Donovan Oconnor from GI. He had both an upper endoscopy and attempt at colonoscopy. He had upper endoscopy confirmed a malignant appearing stricture at 37 to 40 cm. If he had already had a PEG placement because of this prior cancer. 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 Because of this near obstructing mass, he underwent initially laparoscopic laparotomy that had to be converted to open secondary to extensive carcinomatosis. He ultimately required an ileostomy because of an obstructing mass on the right colon area. He was found to have diffuse cancer throughout the surgical site. In addition, he underwent a Pleurx drain placement on the right side of his chest secondary to recurrent malignant pleural effusion.   Unfortunately patient continued to decline postoperatively to the point that he was unable to eat, drink, nor have resolution of postop ileus/bowel obstruction secondary to his cancer. He is becoming more restless and confused and ultimately family elected to transition to hospice care. The patient's principle diagnosis has resulted in bowel obstruction Refer to LCD Functionally, the patient's Karnofsky and/or Palliative Performance Scale has declined over a period of weeks and is estimated at 10-20. The patient is dependent on the following ADLs: All Objective information that support this patients limited prognosis includes: Carcinomatosis found on surgery The patient/family chose comfort measures with the support of Hospice. HOSPICE DIAGNOSES Active Symptoms: 
1. Generalized abdominal pain 2. Restlessness with agitation 3. Persistent nausea and vomiting 4. Extensive carcinomatosis PLAN 1. Patient will be admitted to general inpatient level of care secondary to frequent nursing assessment, IV medication needs, and inability to manage this in the home setting. In addition he has had a very rapid decline over the last several days with increased agitation and restlessness. 2. Restlessness with agitation he will be started on IV Ativan 1 mg IV every 4 hours scheduled and every 15 minutes as needed. 3. Generalized abdominal pain we will start Dilaudid 1 mg IV every 4 hours scheduled and every 15 minutes as needed 4. Nausea and vomiting we have ordered as needed Zofran and Compazine. 5. In addition, comfort order set has been placed to include antiemetics and antipyretics and further medications to include Haldol for agitation 6. Plan reviewed with family to include his wife and sister at the bedside. 7. Discussed plan with bedside nurse and Sarai Davis hospice nurse 8.  and SW to support family needs 9. Disposition: Likely will pass in the hospital 
 
Prognosis estimated based on 04/29/19 clinical assessment is:  
[x] Hours to Days   
[] Days to [facility] [] Other: 
 
Communicated plan of care with: Hospice Case Manager; Hospice IDT; Care Team 
 
 GOALS OF CARE Patient/Medical POA stated Goal of Care: Hospice care 
 
[x] I have reviewed and/or updated ACP information in the Advance Care Planning Navigator. This information is available in the [address] link in the patient's chart header. Primary Decision [facility] Agent):   Primary Decision Maker: Woody Mcallister - Spouse - [phone] Secondary Decision Maker: Navdeep Humphreys - Sister - [phone] Resuscitation Status: DNR If DNR is there a Durable DNR on file? : [] Yes [] No (If no, complete Durable DNR) HISTORY History obtained from: Chart, spouse, sister CHIEF COMPLAINT: Anal pain The patient is:  
[x] Verbal 
[] Nonverbal 
[] Unresponsive HPI/SUBJECTIVE: Patient is verbal but very restless and confused. He was not able to participate in our discussion today. His sister and spouse were at the bedside and talked with Cielo Dorman and I. 
 
 
 [address] The following systems were: [x] reviewed  [] unable to be reviewed Positive ROS include: 
Constitutional: fatigue, weakness, in pain, short of breath Gastrointestinal:poor appetite, nausea, vomiting, abdominal pain, Musculoskeletal:pain, Neurologic:confusion, hallucinations, weakness Psychiatric:anxiety, Adult Non-Verbal Pain Assessment Score: 5/10 Face 
[] 0   No particular expression or smile 
[x] 1   Occasional grimace, tearing, frowning, wrinkled forehead 
[] 2   Frequent grimace, tearing, frowning, wrinkled forehead Activity (movement) [] 0   Lying quietly, normal position 
[] 1   Seeking attention through movement or slow, cautious movement 
[x] 2   Restless, excessive activity and/or withdrawal reflexes Guarding 
[] 0   Lying quietly, no positioning of hands over areas of body [x] 1   Splinting areas of the body, tense 
[] 2   Rigid, stiff Physiology (vital signs) [x] 0   Stable vital signs [] 1   Change in any of the following: SBP > 20mm Hg; HR > 20/minute 
[] 2   Change in any of the following: SBP > 30mm Hg; HR > 25/minute Respiratory 
[] 0   Baseline RR/SpO2, compliant with ventilator 
[x] 1   RR > 10 above baseline, or 5% drop SpO2, mild asynchrony with ventilator 
[] 2   RR > 20 above baseline, or 10% drop SpO2, asynchrony with ventilator FUNCTIONAL ASSESSMENT Palliative Performance Scale (PPS):20 
 
 
 PSYCHOSOCIAL/SPIRITUAL ASSESSMENT Active Problems: 
  Colorectal cancer ([facility] (2019) Past Medical History:  
Diagnosis Date  Cancer [facility])   
 esophagus cancer, right lung cancer  Chronic obstructive pulmonary disease ([facility]  Diabetes ([facility]  Hypertension  Sleep apnea   
 no cpap Past Surgical History:  
Procedure Laterality Date  COLONOSCOPY N/A 2019 COLONOSCOPY performed by Deyanira Lee MD at [facility] HX ENDOSCOPY    
 w/ PEG tube  HX HEENT    
 14years old had eye surgery  IR INSERT CATH PLEURAL INDWELL  2019 Social History Tobacco Use  Smoking status: Former Smoker Last attempt to quit: 2018 Years since quittin.2  Smokeless tobacco: Never Used Substance Use Topics  Alcohol use: No  
 
Family History Problem Relation Age of Onset  Heart Disease Mother  Hypertension Mother  Heart Disease Brother  Heart Disease Maternal Grandmother  Heart Disease Paternal Grandmother Allergies Allergen Reactions  Codeine Other (comments)  
  hallucinations  Pcn [Penicillins] Hives States \"it's a childhood allergy\" Current Facility-Administered Medications Medication Dose Route Frequency  haloperidol lactate (HALDOL) injection 2 mg  2 mg IntraVENous Q6H PRN  
 ondansetron (ZOFRAN) injection 4 mg  4 mg IntraVENous Q4H PRN  prochlorperazine (COMPAZINE) injection 10 mg  10 mg IntraVENous Q4H PRN  
  LORazepam (ATIVAN) injection 1 mg  1 mg IntraVENous Q15MIN PRN  
 ketorolac (TORADOL) injection 30 mg  30 mg IntraVENous Q8H PRN  
 glycopyrrolate (ROBINUL) injection 0.2 mg  0.2 mg IntraVENous Q4H PRN  
 bisacodyl (DULCOLAX) suppository 10 mg  10 mg Rectal DAILY PRN  
 HYDROmorphone (PF) (DILAUDID) injection 1 mg  1 mg IntraVENous Q4H  
 HYDROmorphone (PF) (DILAUDID) injection 1 mg  1 mg IntraVENous Q15MIN PRN  
 LORazepam (ATIVAN) injection 1 mg  1 mg IntraVENous Q4H PHYSICAL EXAM  
 
Wt Readings from Last 3 Encounters:  
04/12/19 144 lb (65.3 kg) 06/09/14 192 lb (87.1 kg) 04/09/14 193 lb 6.4 oz (87.7 kg) There were no vitals taken for this visit. Supplemental O2  [] Yes  [x] NO Last bowel movement:  
 
Currently this patient has: 
[] Peripheral IV [] PICC  [x] PORT [] ICD [x] Batres Catheter [] NG Tube   [] PEG Tube   
[] Rectal Tube [] Drain 
[] Other:  
 
Constitutional: Emaciated, ill-appearing, restless, really only alert to person. Eyes: Sunken ENMT: Dry 
Cardiovascular: Tachycardia Respiratory: Slight respiratory distress, Pleurx drain on the right Gastrointestinal: Distended, minimal to no bowel sounds, ileostomy with small amount of drainage, PEG in place Musculoskeletal: Muscle wasting Skin: Thin Neurologic: Restless and agitated Pertinent Lab and or Imaging Tests: 
Lab Results Component Value Date/Time Sodium 138 04/29/2019 04:28 AM  
 Potassium 4.1 04/29/2019 04:28 AM  
 Chloride 109 (H) 04/29/2019 04:28 AM  
 CO2 22 04/29/2019 04:28 AM  
 Anion gap 7 04/29/2019 04:28 AM  
 Glucose 150 (H) 04/29/2019 04:28 AM  
 BUN 59 (H) 04/29/2019 04:28 AM  
 Creatinine 2.31 (H) 04/29/2019 04:28 AM  
 BUN/Creatinine ratio 26 (H) 04/29/2019 04:28 AM  
 GFR est AA 36 (L) 04/29/2019 04:28 AM  
 GFR est non-AA 30 (L) 04/29/2019 04:28 AM  
 Calcium 8.0 (L) 04/29/2019 04:28 AM  
 
Lab Results Component Value Date/Time  Protein, total 4.8 (L) 04/23/2019 05:40 AM  
 Albumin 1.9 (L) 04/23/2019 05:40 AM  
 
   
 
Total time: 60 
Counseling / coordination time:  
> 50% counseling / coordination?:

## 2019-04-29 NOTE — DISCHARGE SUMMARY
[address] [address] 
[address] Tel: [phone] Physician Discharge Summary Patient ID:    Sophie Mcgraw Age:              44 y.o.    : 1970 MRN:             [id number] PCP: Senia Bae MD  
 
Admit date: 2019 Discharge date: 2019 Principal admission Diagnosis: Dyspnea [R06.00] Discharge Diagnoses: 
Principal Problem: 
  Metastatic adenocarcinoma ([address] (2019) Adenocarcinoma of colon ([address] (2019) Pleural effusion on right (2019) Dyspnea (2019) Anal stenosis (2019) Anxiety with depression (2019) History of esophageal cancer (2019) History of lung cancer (2019) HERMINIA (acute kidney injury) ([address] (2019) CKD (chronic kidney disease) stage 4, GFR 15-29 ml/min (HCC) (2019) Severe protein-calorie malnutrition ([address] (2019) Esophageal dysphagia (2019) Weakness generalized (2019) Consults: Pulmonary/Intensive care, General Surgery, Palliative Care and Hospice Hospital Course: Mr. Peter Child is a 44 y.o. admitted to [facility] and treated for the following: Metastatic adenocarcinoma ([address] (2019) /  Adenocarcinoma of colon ([address] (2019) / Anal stenosis (2019) POA: patient had been seen by GI and had a colonoscopy for anal pain at which point anal stenosis was noted and path was likely a false neg test. Seen by general surgery and had an ex lap done . Partially obstructing hepatic flexure mass noted with diffuse carcinomatosis and an omental patch path showed a metastatic adenocarcinoma confirmed on ascites fluid cytology. Serial abdominal X rays have shown protracted SBO with little barium progression. Minimal ileostomy output.  Patient has been reviewed by palliative care and hospice and due to clinical deterioration, will be admitted to hospice.   
  
 Pleural effusion on right (4/27/2019) / History of lung cancer (4/27/2019) / Dyspnea (4/12/2019) POA: CT chest showed a large right pleural effusion with a BERNARDO lung nodule concerning for metastasis. Effusion likely malignant. Seen by IR and now has tunneled long-term right-sided pleural drainage catheter. Fluid being drained on TTS for comfort.   
  
Esophageal cancer / Esophageal stricture / Esophageal dysphagia (4/27/2019) / Nausea / vomiting / abdominal pain POA: esophageal stricture noted on EGD during PEG placement. Now with pSBO vs Ileus noted on KUB with little progress. Tolerated diverting ileostomy 4/15 but now with minimal output. Surgery following. Cannot have PEG feedings and now encephalopathic. Comfort feeding while on hospice.    
  
HERMINIA (acute kidney injury) ([address] (4/27/2019) / CKD (chronic kidney disease) stage 4, GFR 15-29 ml/min ([facility]) (4/27/2019): now with progressively worsening renal function due to poor intake. No further testing  
  
Severe protein-calorie malnutrition ([address] (4/27/2019): multifactorial from metastatic cancer, poor intake from pSBO. Cannot tolerate any feeding due to obstruction. comfort feeding    
  
Anxiety and depression: discontinue Zoloft. Discharge Exam:   
Visit Vitals BP 98/65 (BP 1 Location: Left arm, BP Patient Position: At rest) Pulse 78 Temp 97.2 °F (36.2 °C) Resp 17 Ht 5' 9\" (1.753 m) Wt 65.3 kg (144 lb) SpO2 99% BMI 21.27 kg/m² General: weak, cachectic and intermittently confused. Pulm: decreased breath sounds without wheezes Card: no murmurs, normal S1, S2 without thrills, bruits Abd:    firm, decreased bowel sounds Skin: no rashes or ulcers, skin turgor is poor Neuro: confused but not agitated Activity: Bed rest  
 
Diet: Comfort feeding Medications: as per hospice protocol Follow-up tests or labs: None Discharge Condition: critical  
 
Disposition: Inpatient hospice Time taken to arrange discharge:  35 minutes. Signed: 
Arabella Beal MD     [facility] 4/29/2019   2:02 PM

## 2019-04-29 NOTE — PROGRESS NOTES
Orders received. Spoke to nursing, patient's wife and sister. All confirm that patient is not appropriate for PT. He is confused and not following commands. Wife expecting patient to be accepted into hospice care later today. PT will sign off.

## 2019-04-29 NOTE — HOSPICE
[facility] in [facility] 
[phone] Inpatient Nursing Admission Patient Name: Lisa Alva YOB: 1970 Age: 44 y.o. Date of Hospice Admission: 4/29/2019 Hospice Attending Elected by Patient: Rob Padron MD 
Primary Care Physician: Haroon Hillman MD 
Admitting RN: Jon Magana RN : Joby Ocasio Level of Care (GIP/Routine/Respite): GIP Facility of Care: [facility] Patient Room: [facility]/01 HOSPICE SUMMARY  
ER Visits/ Hospitalizations in past year: 1 Hospice Diagnosis: Colorectal cancer ([address] Lorean Stagers Onset Date of Hospice Diagnosis: 4/29/19 Summary of Disease Progression Leading to Hospice Diagnosis:  
Lisa Alva is a 44 y.o. with a past history of lung cancer, esophageal cancer hypertension, COPD, status post PEG, who was admitted on 4/12/2019 from home with a diagnosis of newly diagnosed likely anal or rectal CA. Current medical issues leading to Palliative Medicine involvement include: Overwhelming symptoms. 
  
History of present illness/past medical history patient is a 33-year-old gentleman with a history of esophageal cancer as well as lung cancer. He is followed Dr. Michelle Estevez from [address] [place] [address]. He was supposed to see [facility] [place] in approximately 2 weeks for follow-up on his esophageal cancer. He already has a PEG placed secondary to his esophageal cancer. He states he had been on some type of \"oral chemotherapy agent but this was causing significant side effects and so had recently been stopped. He is been having significant anal type pain and had seen Dr. Cinthia Watson as an outpatient. He presents today for attempts at a colonoscopy as well as repeat EGD. Unfortunately EGD showed what appears to be malignant narrowing at 37 to 40 cm. In addition, attempted a colonoscopy were not able to be completed secondary to significant needle stricture, sclerosis.   Even a pediatric colonoscope could not pass the anal stenosis. Ultimately an upper GI scope was used to bypass into the rectal area which profuse stool was seen throughout. The anal canal is friable firm and was concerning for anal carcinoma. He was having significant pain and discomfort but that has seemed to improve by the time I have seen the patient. He is waiting Colorectal surgeon evaluation. He admits that his bowel movements have been less over the last couple months and describes very small stools. His breathing has been worse over the last several weeks. He had used tramadol at home to help with his pain but that gave no relief. Study Result History: Evaluate small bowel obstruction. 
  
An AP radiograph of the abdomen demonstrates barium in persistently 
significantly dilated small bowel loops. There is persistent barium in 
nondistended colon in the pelvis. 
  
IMPRESSION IMPRESSION: Small bowel obstruction. Very little if any small bowel barium Progression FINDINGS: 
  
LUNGS: Completely lobar atelectasis of the right middle and lower lobes with 
central air bronchograms suggesting underlying consolidation as well. New 6 mm 
left upper lobe lung nodule (series 2, image 24). Mild to moderate emphysema. Lingular and left lower lobe groundglass and reticular opacities may be 
infectious/inflammatory. The central airways are patent. LYMPH NODES: Minimally enlarged superior mediastinal lymph nodes which are new. Representative lymph node measures 10 mm in short axis (series 2, image 14). PLEURAL FLUID: Large right pleural effusion. Trace left pleural fluid PERICARDIAL FLUID: Small pericardial effusion THYROID: No thyroid nodule. OTHER: Left chest port extends to the atriocaval junction. Gallbladder sludge. Mild ascites. G-tube is noted in place. Multiple bilateral renal cysts and other 
indeterminate intermediate to high density renal lesions. There is suggestion of 
peritoneal carcinomatosis in the left upper abdomen.   
IMPRESSION IMPRESSION: 
  
Very large right pleural effusion with lobar atelectasis of the right middle and 
lower lobes with central air bronchograms suggesting underlying consolidation as 
well. New 6 mm left upper lobe lung nodule concerning for metastasis. Lingular 
and left lower lobe groundglass and reticular opacities may be 
infectious/inflammatory. Mild ascites. There is suggestion of peritoneal carcinomatosis in the left upper 
abdomen. 
  
Multiple bilateral renal cysts and other indeterminate intermediate to high 
density renal lesions. CYTOLOGIC INTERPRETATION:  
1. Peritoneal fluid, ThinPrep and cell block:  
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  
Potassium 4.1   3.5 - 5.1 mmol/L Final  
Chloride 109  High   97 - 108 mmol/L Final  
CO2 22   21 - 32 mmol/L Final  
Anion gap 7   5 - 15 mmol/L Final  
Glucose 150  High   65 - 100 mg/dL Final  
BUN 59  High   6 - 20 MG/DL Final  
Creatinine 2.31  High   0.70 - 1.30 MG/DL Final  
BUN/Creatinine ratio 26  High   12 - 20   Final  
GFR est AA 36  Low   >60 ml/min/1.73m2 Final  
GFR est non-AA 30  Low   >60 ml/min/1.73m2 Final  
Calcium 8.0  Low   8.5 - 10.1 MG/DL Final  
 
 
Co-Morbidities:  
Patient Active Problem List  
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  
 Metastatic adenocarcinoma ([facility] C79.9  Anal stenosis K62.4  Adenocarcinoma of colon ([address] C18.9  Pleural effusion on right J90  
 Anxiety with depression F41.8  History of esophageal cancer Z85.01  
 History of lung cancer Z85. [facility]  HERMINIA (acute kidney injury) ([address] N17.9  CKD (chronic kidney disease) stage 4, GFR 15-29 ml/min (HCC) N18.4  Severe protein-calorie malnutrition ([facility] E43  Esophageal dysphagia R13.10  Weakness generalized R53.1  Colorectal cancer ([facility] C19 Diagnoses RELATED to the terminal prognosis: lung cancer, esophageal cancer Other Diagnoses: see above, multiple comorbidities Rationale for a prognosis of life expectancy of 6 months or less if the disease follows its normal course (Disease Specific History):  
 
Lisa Alva is a 44 y.o. who was admitted to [facility]. The patient's principle diagnosis of colorectal cancer has resulted in decrease in function and mental status, need for ileostomy which still does not function, agitation, pain. Functionally, the patient's Palliative Performance Scale has declined over a period of weeks and is estimated at 30. Objective information that support this patients limited prognosis includes: inability to perform any ADLs without assistance, few bites and sips of food/drink, confusion. The patient/family chose comfort measures with the support of Hospice. Patient meets for GIP LOC as evidenced by need for scheduled IV meds to manage symptoms of pain and agitation. Prognosis estimated based on 04/29/19 clinical assessment is:  
[] Few to Many Hours [] Hours to Days  
[] Few to Many Days [x] Days to Weeks  
[] Few to Many Weeks  
[] Weeks to Months  
[] Few to Many Months ASSESSMENT Patient self-reports:  []  Yes    [x] No 
 
SYMPTOMS: pain, agitation, restlessness SIGNS/PHYSICAL FINDINGS: Patient lying in bed awake with wife and sister at bedside. Patient can converse but is confused. He seems to be shaking all over. Wife and sister say patient has been getting out of bed and pulling at lines all night. Patient wearing abdominal binder in order to mask the pleural drain and ileostomy which he was pulling at. Slightly jaundiced, belly distended. Had not been drained yet today.   
 
KARNOFSKY: 30 
 
FAST for all dementia:   
 
 Learning Assessment: 
Patient Is patient willing/able to learn? What is the highest level of education completed? Learning preference (written material, demonstration, visual)? Learning barriers (ESOL, [place], poor vision)? Caregiver Is caregiver willing to learn care for patient? What is the highest level of education completed? Learning preference (written material, demonstration, visual)? Learning barriers (ESOL, [place], poor vision)? CLINICAL INFORMATION Wt Readings from Last 3 Encounters:  
04/12/19 65.3 kg (144 lb) 06/09/14 87.1 kg (192 lb) 04/09/14 87.7 kg (193 lb 6.4 oz) Ht Readings from Last 3 Encounters:  
04/12/19 5' 9\" (1.753 m) 06/09/14 5' 9\" (1.753 m) 04/09/14 5' 9\" (1.753 m) There is no height or weight on file to calculate BMI. There were no vitals taken for this visit. LAB VALUES No results found for this visit on 04/29/19 (from the past 12 hour(s)). No results found for this visit on 04/29/19 (from the past 6 hour(s)). Lab Results Component Value Date/Time Protein, total 4.8 (L) 04/23/2019 05:40 AM  
 Albumin 1.9 (L) 04/23/2019 05:40 AM  
 
 
Currently this patient has: 
[] Supplemental O2 [] Peripheral IV  [] PICC    [x] PORT  
[] Batres Catheter [] NG Tube   [] PEG Tube [] Ostomy   
[] AICD: Has ICD been deactivated? [] Yes [] No:______ PLAN 1. Admit to GIP LOC 2. Schedule IV ativan for agitation 3. Schedule IV dilaudid for pain 4. Order comfort set orders for prn medications. 5. Provide emotional support to his wife. 6. Educate on end of life concerns. Hospice Team Frequency Orders:Skilled Nurse -  Once Daily x 7 days with 5 PRN visits for symptom control. MSW  1 visit for initial assessment/evaluation for family support and need for volunteer services. Marry Espinoza  1 visit for initial assessment/evaluation for spiritual support. ADVANCE CARE PLANNING (Complete in ACP Flow Sheet) Code Status: DNR Durable DNR: [x]  Yes  []  No 
 Code Status Discussed/Confirmed: yes Preference for Other Life Sustaining Treatment Discussed/Confirmed: yes Hospitalization Preference:  [facility] Advance Care Planning 2019 Patient's Healthcare Decision Maker is: Verbal statement (Legal Next of Kin remains as decision maker) Confirm Advance Directive Yes, not on file  Service: [] Yes  [x]  No      [] Unknown Appropriate for Pinning Ceremony:  [] Yes     [x] No 
[employer]: NO Yarsani  Home: tbd DISCHARGE PLANNING 1. Discharge Plan: will likely  in hospital 
2. Patient/Family teaching: medications, EOL concerns 3. Response to patient/family teaching: expressed understanding SOCIAL/EMOTIONAL/SPIRITUAL NEEDS Spiritual Issues Identified:  visit Psych/ Social/ Emotional Issues Identified: MSW visit to assess Caregiver Support: 
[x] Provided information on End of Life Care  
[x] Material Provided: Lesli Payne From My [facility]ight or [facility]  
 
CARE COORDINATION Dr. Valerie Phelps contacted, discharge to hospice order received Dr. Lin Cheung contacted, agrees to serve as attending provider for hospice and provided verbal certification of terminal illness with life expectancy of 6 months or less. Orders for hospice admission, medications and plan of treatment received. Medication reconciliation completed. MEDS: See medication list below DME: Per hospital 
Supplies: Per hospital 
IDT communication to include MD, SN, SW, CH and support team 
 
ALLERGIES AND MEDICATIONS Allergies: Allergies Allergen Reactions  Codeine Other (comments)  
  hallucinations  Pcn [Penicillins] Hives States \"it's a childhood allergy\" Current Facility-Administered Medications Medication Dose Route Frequency  haloperidol lactate (HALDOL) injection 2 mg  2 mg IntraVENous Q6H PRN  
 ondansetron (ZOFRAN) injection 4 mg  4 mg IntraVENous Q4H PRN  prochlorperazine (COMPAZINE) injection 10 mg  10 mg IntraVENous Q4H PRN  
  LORazepam (ATIVAN) injection 1 mg  1 mg IntraVENous Q15MIN PRN  
 ketorolac (TORADOL) injection 30 mg  30 mg IntraVENous Q8H PRN  
 glycopyrrolate (ROBINUL) injection 0.2 mg  0.2 mg IntraVENous Q4H PRN  
 bisacodyl (DULCOLAX) suppository 10 mg  10 mg Rectal DAILY PRN  
 HYDROmorphone (PF) (DILAUDID) injection 1 mg  1 mg IntraVENous Q4H  
 HYDROmorphone (PF) (DILAUDID) injection 1 mg  1 mg IntraVENous Q15MIN PRN  
 LORazepam (ATIVAN) 2 mg/mL injection 1 mg  1 mg IntraVENous Q4H

## 2019-04-29 NOTE — ROUTINE PROCESS
Bedside and Verbal shift change report given to Jacek Pablo (oncoming nurse) by Sarwat Houston (offgoing nurse). Report included the following information SBAR, Kardex, Intake/Output, MAR, Accordion and Recent Results.

## 2019-04-29 NOTE — PROGRESS NOTES
is visiting for an initial, hospice, spiritual assessment.  met with patient's wife and sister. I stepped outside with family, as staff were busy bathing Lorelei Erps at the time. His wife and sister shared about his decline and admission to hospice. They shared about their anticipatory grief and sense that his dying is coming too soon. They talked through his adam and how he has sought to give other people hope as he has been going through his treatments. His [employer] adam as been instrumental for this, where he has given other's hand carved crosses he purchased with the desire to provide others with hope, while he continues to maintain his hope in God. His wife processed her grief and shared she has her moments, but has friends and pastors who have been very supportive of she and Lorelei Erps.  provided prayer with them outside patient's room. Interventions: Spiritual assessment, pastoral presence allowing them to process their anticipatory grief, and prayer. Goals for the next two weeks: 1 week 2, 2 PRN; for pastoral support aiding family grief process and encouraging adam for coping. Chaplain Luz Rose M.Div, M.S, [address] Spiritual Care available at [facility]([phone])

## 2019-04-29 NOTE — PROGRESS NOTES
SURGERY PROGRESS NOTE Admit Date: 2019 POD 12 Days Post-Op Procedure: Procedure(s): LAPAROSCOPIC CONVERTED TO OPEN  DIVERTING ILEOSTOMY Subjective:  
Confused overnight 
reports nausea improved Objective:  
 
Visit Vitals BP 98/65 (BP 1 Location: Left arm, BP Patient Position: At rest) Pulse 78 Temp 97.2 °F (36.2 °C) Resp 17 Ht 5' 9\" (1.753 m) Wt 65.3 kg (144 lb) SpO2 99% BMI 21.27 kg/m² Temp (24hrs), Av.5 °F (36.4 °C), Min:97.2 °F (36.2 °C), Max:97.7 °F (36.5 °C) Physical Exam: Abdomen:  Soft. Non-tender, non-distended. Incision C/D/I. Ostomy pink and productive Lab Results Component Value Date/Time WBC 20.9 (H) 2019 04:28 AM  
 HGB 9.9 (L) 2019 04:28 AM  
 Hemoglobin (POC) 17.0 2014 02:22 PM  
 HCT 31.1 (L) 2019 04:28 AM  
 Hematocrit (POC) 50 2014 02:22 PM  
 PLATELET 737  04:28 AM  
 MCV 89.9 2019 04:28 AM  
 
Lab Results Component Value Date/Time GFR est non-AA 30 (L) 2019 04:28 AM  
 GFRNA, POC 40 (L) 2018 04:15 PM  
 GFR est AA 36 (L) 2019 04:28 AM  
 GFRAA, POC 49 (L) 2018 04:15 PM  
 Creatinine 2.31 (H) 2019 04:28 AM  
 Creatinine (POC) 1.8 (H) 2018 04:15 PM  
 BUN 59 (H) 2019 04:28 AM  
 BUN (POC) 25 (H) 2014 02:22 PM  
 Sodium 138 2019 04:28 AM  
 Sodium (POC) 137 2014 02:22 PM  
 Potassium 4.1 2019 04:28 AM  
 Potassium (POC) 3.8 2014 02:22 PM  
 Chloride 109 (H) 2019 04:28 AM  
 Chloride (POC) 105 2014 02:22 PM  
 CO2 22 2019 04:28 AM  
 Magnesium 1.7 2019 04:33 AM  
 Phosphorus 3.0 2019 05:40 AM  
 
 
Assessment:  
 
Principal Problem: 
  Metastatic adenocarcinoma ([address] (2019) Active Problems: Dyspnea (2019) Anal stenosis (2019) Adenocarcinoma of colon ([address] (2019) Pleural effusion on right (2019) Anxiety with depression (4/27/2019) History of esophageal cancer (4/27/2019) History of lung cancer (4/27/2019) HERMINIA (acute kidney injury) ([facility] (4/27/2019) CKD (chronic kidney disease) stage 4, GFR 15-29 ml/min (HCC) (4/27/2019) Severe protein-calorie malnutrition ([facility] (4/27/2019) Esophageal dysphagia (4/27/2019) Weakness generalized (4/27/2019) Plan/Recommendations/Medical Decision Making:  
Ostomy looks much better pink with some output Diet as tolerates - currently NPO for mental status Awaiting hospice placement possibly later today

## 2019-04-29 NOTE — PROGRESS NOTES
Name: Herminia Gouverneur Dr: [facility]  
: 1970 Admit Date: 2019 Phone:   Room: 535/01 PCP: Marcio Rayo MD  MRN: [id number] Date: 2019  Code: Full Code Chart and notes reviewed. Data reviewed. I review the patient's current medications in the medical record at each encounter. I have evaluated and examined the patient. History of Present Illness: 
Mr. Shannan Patel is a 45 yo gentleman with a history esophageal cancer, lung cancer, LIZETH (does not wear CPAP), COPD, and new suspected anal/rectal cancer. Found to have a large right sided pleural effusion. Underwent colonoscopy and EGD today. Had his PEG replaced and found to have significant anal/rectal stenosis, suspicious for malignancy. He had a chest x-ray for shortness of breath and found to have a right sided effusion. He tells me has been short of breath for quite some time and attributed it to his COPD. No cough, chest pain, f/c. 
 
Labs reviewed: no new labs today other than a glucose of 130 I have personally reviewed chest CT from [facility] in 2019. Pt with mod R effusion at that time and mass like lesion in RLL. PET scan in Feb with increased uptake in RLL w mod effusion. New R paracolic soft tissue mass w increased uptake Overnight Events Afebrile HR in the 60s BP soft but stable O2 sats 95% on RA Per family more tachypnic this AM 
Pt denies shortness or pain. Remains somewhat agitated per family. Past Medical History:  
Diagnosis Date  Cancer [facility])   
 esophagus cancer, right lung cancer  Chronic obstructive pulmonary disease ([facility]  Diabetes ([facility]  Hypertension  Sleep apnea   
 no cpap Past Surgical History:  
Procedure Laterality Date  COLONOSCOPY N/A 2019 COLONOSCOPY performed by Collin Alex MD at [address] HX ENDOSCOPY    
 w/ PEG tube  HX HEENT    
 14years old had eye surgery  IR INSERT CATH PLEURAL INDWELL  2019 Family History Problem Relation Age of Onset  Heart Disease Mother  Hypertension Mother  Heart Disease Brother  Heart Disease Maternal Grandmother  Heart Disease Paternal Grandmother Social History Tobacco Use  Smoking status: Former Smoker Last attempt to quit: 2018 Years since quittin.2  Smokeless tobacco: Never Used Substance Use Topics  Alcohol use: No  
 
 
Allergies Allergen Reactions  Codeine Other (comments)  
  hallucinations  Pcn [Penicillins] Hives States \"it's a childhood allergy\" Current Facility-Administered Medications Medication Dose Route Frequency  haloperidol lactate (HALDOL) injection 1 mg  1 mg IntraVENous Q6H PRN  
 levalbuterol (XOPENEX) nebulizer soln 1.25 mg/3 mL  1.25 mg Nebulization Q6HWA RT  
 prochlorperazine (COMPAZINE) injection 5 mg  5 mg IntraVENous Q4H PRN  
 LORazepam 8000 mcg,diphen 200 mg,dexam 13.32 mg in 50 mL total vol NS PCA   IntraVENous CONTINUOUS  
 dextrose 5 % - 0.45% NaCl infusion  100 mL/hr IntraVENous CONTINUOUS  
 aluminum-magnesium hydroxide (MAALOX) oral suspension 30 mL  30 mL Oral Q6H PRN  
 morphine injection 4 mg  4 mg IntraVENous Q3H PRN  
 lidocaine (PF) (XYLOCAINE) 10 mg/mL (1 %) injection 0.1 mL  0.1 mL SubCUTAneous PRN  
 sodium chloride (NS) flush 5-40 mL  5-40 mL IntraVENous Q8H  
 sodium chloride (NS) flush 5-40 mL  5-40 mL IntraVENous PRN  
 acetaminophen (TYLENOL) tablet 650 mg  650 mg Oral Q4H PRN  
 ondansetron (ZOFRAN) injection 4 mg  4 mg IntraVENous Q4H PRN  
 oxyCODONE (ROXICODONE INTENSOL) 20 mg/mL concentrated solution 5 mg  5 mg Oral Q4H PRN  
 sertraline (ZOLOFT) tablet 50 mg  50 mg Oral DAILY REVIEW OF SYSTEMS  
12 point ROS negative except as stated in the HPI. Physical Exam:  
Visit Vitals BP 99/62 (BP 1 Location: Left arm, BP Patient Position: At rest) Pulse 69  
 Temp 97.3 °F (36.3 °C) Resp 16 Ht 5' 9\" (1.753 m) Wt 65.3 kg (144 lb) SpO2 95% BMI 21.27 kg/m² General:  Alert, cooperative, ill appearing,  appears stated age. Mildly tachypnic Head:  Normocephalic, without obvious abnormality, atraumatic. Eyes:  Conjunctivae/corneas clear. Nose: Nares normal. Septum midline. Mucosa normal.   
Throat: Lips, mucosa, and tongue normal.   
Neck: Supple, symmetrical, trachea midline, no adenopathy. Lungs:   R ant chest with air entry but diminished in lower right 1/3 field. R pleurx dressing: clean, dry and intact. Left sounds clear. Chest wall:  No tenderness or deformity. Heart:  Regular rate and rhythm, S1, S2 normal, no murmur, click, rub or gallop. Abdomen:   Soft, mildly tender. PEG in place. Ostomy in place with brown stool. Extremities: Extremities normal, atraumatic, no cyanosis or edema. Pulses: 2+ and symmetric all extremities. Skin: Skin color, texture, turgor normal. No rashes or lesions Lymph nodes: Cervical, supraclavicular nodes normal.  
Neurologic: Grossly nonfocal, Lab Results Component Value Date/Time Sodium 138 04/29/2019 04:28 AM  
 Potassium 4.1 04/29/2019 04:28 AM  
 Chloride 109 (H) 04/29/2019 04:28 AM  
 CO2 22 04/29/2019 04:28 AM  
 BUN 59 (H) 04/29/2019 04:28 AM  
 Creatinine 2.31 (H) 04/29/2019 04:28 AM  
 Glucose 150 (H) 04/29/2019 04:28 AM  
 Calcium 8.0 (L) 04/29/2019 04:28 AM  
 Magnesium 1.7 04/25/2019 04:33 AM  
 Phosphorus 3.0 04/23/2019 05:40 AM  
 
 
Lab Results Component Value Date/Time WBC 20.9 (H) 04/29/2019 04:28 AM  
 HGB 9.9 (L) 04/29/2019 04:28 AM  
 PLATELET 288 08/91/5067 04:28 AM  
 MCV 89.9 04/29/2019 04:28 AM  
 
 
Lab Results Component Value Date/Time AST (SGOT) 12 (L) 04/23/2019 05:40 AM  
 Alk.  phosphatase 56 04/23/2019 05:40 AM  
 Protein, total 4.8 (L) 04/23/2019 05:40 AM  
 Albumin 1.9 (L) 04/23/2019 05:40 AM  
 Globulin 2.9 04/23/2019 05:40 AM  
 
 
 No results found for: IRON, FE, TIBC, IBCT, PSAT, FERR No results found for: SR, CRP, TALA, ANAIGG, RA, RPR, RPRT, VDRLT, VDRLS, TSH, TSHEXT, TSHEXT No results found for: PH, PHI, PCO2, PCO2I, PO2, PO2I, HCO3, HCO3I, FIO2, FIO2I No results found for: CPK, RCK1, RCK2, RCK3, RCK4, CKNDX, CKND1, TROPT, TROIQ, BNPP, BNP Lab Results Component Value Date/Time Culture result: NO GROWTH 4 DAYS 04/12/2019 12:27 PM  
 
 
No results found for: TOXA1, RPR, HBCM, HBSAG, HAAB, HCAB1, HAAT, G6PD, CRYAC, HIVGT, HIVR, HIV1, HIV12, HIVPC, HIVRPI No results found for: VANCT, CPK Lab Results Component Value Date/Time Color RED 04/19/2019 09:34 AM  
 Appearance BLOODY (A) 04/19/2019 09:34 AM  
 pH (UA) 5.5 04/19/2019 09:34 AM  
 Protein 300 (A) 04/19/2019 09:34 AM  
 Glucose NEGATIVE  04/19/2019 09:34 AM  
 Ketone 15 (A) 04/19/2019 09:34 AM  
 Blood LARGE (A) 04/19/2019 09:34 AM  
 Urobilinogen 1.0 04/19/2019 09:34 AM  
 Nitrites POSITIVE (A) 04/19/2019 09:34 AM  
 Leukocyte Esterase MODERATE (A) 04/19/2019 09:34 AM  
 WBC 10-20 04/19/2019 09:34 AM  
 RBC >100 (H) 04/19/2019 09:34 AM  
 Bacteria 3+ (A) 04/19/2019 09:34 AM  
 
 
IMPRESSION 
· Malignant pleural effusion: adenocarcinoma. S/p Pleurx drain 4/16.  400-500 cc being drained. · Esophageal cancer · Lung cancer · Suspected anal/rectal cancer · Partial SBO · COPD · Sleep apnea (does not wear CPAP) PLAN 
· Goal sats >90%; maintaining well on RA 
· Cont draining PleurX to T/Th/Sat · CXR today to make sure pleural effusion re-accumulation not reason for more tachypnea. Tachypnea may be due to agitation. · Cont with xopenex nebs · Pain control per Palliative Care · Clear liquid diet · OOB as able · Patient and family considering hospice.   
 
 
Clarisse Harding MD

## 2019-04-29 NOTE — PROGRESS NOTES
Palliative Medicine Consult Patient Name: Maxi Castle YOB: 1970 Date of Initial Consult: 4/12/2019 Reason for Consult: Dr. Chadwick Opitz Requesting Provider: Overwhelming symptoms Primary Care Physician: Jose Dye MD 
  
 SUMMARY:  
Maxi Castle is a 44 y.o. with a past history of lung cancer, esophageal cancer hypertension, COPD, status post PEG, who was admitted on 4/12/2019 from home with a diagnosis of newly diagnosed likely anal or rectal CA. Current medical issues leading to Palliative Medicine involvement include: Overwhelming symptoms. History of present illness/past medical history patient is a 47-year-old gentleman with a history of esophageal cancer as well as lung cancer. He is followed Dr. Severa Must from [facility] [place]. He was supposed to see [facility] in approximately 2 weeks for follow-up on his esophageal cancer. He already has a PEG placed secondary to his esophageal cancer. He states he had been on some type of \"oral chemotherapy agent but this was causing significant side effects and so had recently been stopped. He is been having significant anal type pain and had seen Dr. Chadwick Opitz as an outpatient. He presents today for attempts at a colonoscopy as well as repeat EGD. Unfortunately EGD showed what appears to be malignant narrowing at 37 to 40 cm. In addition, attempted a colonoscopy were not able to be completed secondary to significant needle stricture, sclerosis. Even a pediatric colonoscope could not pass the anal stenosis. Ultimately an upper GI scope was used to bypass into the rectal area which profuse stool was seen throughout. The anal canal is friable firm and was concerning for anal carcinoma. He was having significant pain and discomfort but that has seemed to improve by the time I have seen the patient. He is waiting Colorectal surgeon evaluation.   He admits that his bowel movements have been less over the last couple months and describes very small stools. His breathing has been worse over the last several weeks. He had used tramadol at home to help with his pain but that gave no relief. Social history he is  to Denis. His sister, Francisco Peoples is also very involved and is in the room. PALLIATIVE DIAGNOSES:  
1. Cancer associated pain 2. Shortness of breathimproved after thoracentesis on the right with 2 L removed 3. 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 4. Esophageal cancer 5. Lung cancer 6. Advance care planning 7. DNR discussion 8. Goals of care PLAN:  
1. Dandy Licea, hospice nurse and I met with family. Patient has had a significant decline over the weekend with agitation, anxiety, and it essentially no p.o. intake. Family did meet with the hospice team again yesterday and are leaning towards transition to hospice care which will likely need to be inpatient. We reviewed the medical issues with patient, spouse, and sister. They are very tearful but also do not want to see him suffer further. They know that he can no longer make complex medical decisions and really want to focus on his comfort. We discussed transition inpatient hospice care and they are ready for that transition. Attila Hagan 2. Goals of care transition to hospice care, inpatient status given the multiple symptoms that we will need to manage. 3. Advance care planning he has completed an advanced medical directive. He has assigned his wife as primary medical power of  and his sister at secondary medical power of . I have asked his family to bring a copy to the hospital. Sister will bring 4. CODE STATUS addressed with his spouse and she is clear on transition to do not attempt resuscitation. We did have a durable DO NOT RESUSCITATE form signed by her today. 5. Symptom management I am concerned that some of the agitation and anxiety may been created by the Decadron which is in the BAD pump so we will stop the pump and transition to scheduled IV opioids and Ativan once then transition to hospice has been made. 6. Discussed with Turner Mckeon and bedside nurse 7. Initial consult note routed to primary continuity provider 8. Communicated plan of care with: Palliative IDT 
 
 
 GOALS OF CARE / TREATMENT PREFERENCES:  
[====Goals of Care====] GOALS OF CARE: 
Patient/Health Care Proxy Stated Goals: Prolong life TREATMENT PREFERENCES:  
Code Status: DNR Advance Care Planning: 
Advance Care Planning 4/26/2019 Patient's Healthcare Decision Maker is: Verbal statement (Legal Next of Kin remains as decision maker) Confirm Advance Directive Yes, not on file Medical Interventions: Full interventions Other Instructions:  
Artificially Administered Nutrition: Feeding tube long-term, if indicated The palliative care team has discussed with patient / health care proxy about goals of care / treatment preferences for patient. 
[====Goals of Care====] HISTORY:  
 
History obtained from: Chart, spouse, sister CHIEF COMPLAINT: Rectal pain HPI/SUBJECTIVE: The patient is:  
[x] Verbal and participatory [] Non-participatory due to:  
Patient is feeling much better. He has had a thoracentesis that removed 2 L of fluid. Feels like his breathing is much improved. 4/17 patient is just returned from the operating room and is still a little bit lethargic. 4/18-awake and alert. Still pain in the abdomen 4/19-pain is better. Lots of blood in the pleur-x drain-did contact pulmonary via bedside nurse 4/22-pain seems to be improved. Less discomfort over the Pleurx drain. 4/23 feels like he is tolerating small amounts of liquids. Pain seems less. 4/24 patient sleeping. Had a small bowel follow-through earlier today. Did not feel like we needed to wake him up. 
 
4/25-patient is awake and alert. Eating very little no significant pain at this time. 4/26 patient appears weaker. He still having persistent nausea and vomiting. He has been unable to really eat or drink anything except water. 4/29-patient restless. Appears anxious and is confused. Clinical Pain Assessment (nonverbal scale for severity on nonverbal patients):  
Clinical Pain Assessment Severity: 3 Location: Anal area Character: Throbbing and aching Duration: Months Effect: Difficult to have a bowel movement Factors: Constipation Frequency: Intermittent Adult Nonverbal Pain Scale Face: No particular expression or smile Activity (Movement): Lying quietly, normal position Guarding: Lying quietly, no positioning of hands over areas of body Physiology (Vital Signs): Stable vital signs Respiratory: Baseline RR/SpO2 compliant with ventilator Total Score: 0 
 
 
 FUNCTIONAL ASSESSMENT:  
 
Palliative Performance Scale (PPS): PPS: 70 PSYCHOSOCIAL/SPIRITUAL SCREENING:  
 
Advance Care Planning: 
Advance Care Planning 4/26/2019 Patient's Healthcare Decision Maker is: Verbal statement (Legal Next of Kin remains as decision maker) Confirm Advance Directive Yes, not on file Any spiritual / Muslim concerns: 
[] Yes /  [x] No 
 
Caregiver Burnout: 
[] Yes /  [x] No /  [] No Caregiver Present Anticipatory grief assessment:  
[x] Normal  / [] Maladaptive ESAS Anxiety: Anxiety: 0 
 
ESAS Depression: Depression: 0 REVIEW OF SYSTEMS:  
 
Positive and pertinent negative findings in ROS are noted above in HPI. The following systems were [x] reviewed / [] unable to be reviewed as noted in HPI Other findings are noted below. Systems: constitutional, ears/nose/mouth/throat, respiratory, gastrointestinal, genitourinary, musculoskeletal, integumentary, neurologic, psychiatric, endocrine. Positive findings noted below. Modified ESAS Completed by: provider Fatigue: 2 Drowsiness: 0 Depression: 0 Pain: 3 Anxiety: 0 Nausea: 0 Anorexia: 3 Dyspnea: 4 Constipation: Yes Stool Occurrence(s): 1 PHYSICAL EXAM:  
 
From RN flowsheet: 
Wt Readings from Last 3 Encounters:  
04/12/19 144 lb (65.3 kg) 06/09/14 192 lb (87.1 kg) 04/09/14 193 lb 6.4 oz (87.7 kg) Blood pressure (P) 93/63, pulse (P) 94, temperature (P) 97.4 °F (36.3 °C), resp. rate (P) 18, height 5' 9\" (1.753 m), weight 144 lb (65.3 kg), SpO2 (P) 98 %. Pain Scale 1: Numeric (0 - 10) Pain Intensity 1: 0 Pain Onset 1: chronic Pain Location 1: Abdomen Pain Orientation 1: Anterior Pain Description 1: Aching Pain Intervention(s) 1: Medication (see MAR) Last bowel movement, if known:  
 
Constitutional: Emaciated, restless, confused Eyes: pupils equal, anicteric ENMT: no nasal discharge, moist mucous membranes Cardiovascular: regular rhythm, distal pulses intact Respiratory: breathing not labored, decreased on the right Gastrointestinal: soft, PEG in place, ileostomy noted, Mild diffuseTTP Musculoskeletal: no deformity, no tenderness to palpation Skin: warm, dry Neurologic: Not following commands, moving all extremities Psychiatric: Flat affect, no hallucinations Other: 
 
 
 HISTORY:  
 
Principal Problem: 
  Metastatic adenocarcinoma ([address] (4/27/2019) Active Problems: Dyspnea (4/12/2019) Anal stenosis (4/27/2019) Adenocarcinoma of colon ([address] (4/27/2019) Pleural effusion on right (4/27/2019) Anxiety with depression (4/27/2019) History of esophageal cancer (4/27/2019) History of lung cancer (4/27/2019) HERMINIA (acute kidney injury) ([address] (4/27/2019) CKD (chronic kidney disease) stage 4, GFR 15-29 ml/min (HCC) (4/27/2019) Severe protein-calorie malnutrition ([address] (4/27/2019) Esophageal dysphagia (4/27/2019) Weakness generalized (4/27/2019) Past Medical History:  
Diagnosis Date  Cancer [facility])   
 esophagus cancer, right lung cancer  Chronic obstructive pulmonary disease [facility]  Diabetes ([facility]  Hypertension  Sleep apnea   
 no cpap Past Surgical History:  
Procedure Laterality Date  COLONOSCOPY N/A 2019 COLONOSCOPY performed by Debby Hutton MD at [facility] HX ENDOSCOPY    
 w/ PEG tube  HX HEENT    
 14years old had eye surgery  IR INSERT CATH PLEURAL INDWELL  2019 Family History Problem Relation Age of Onset  Heart Disease Mother  Hypertension Mother  Heart Disease Brother  Heart Disease Maternal Grandmother  Heart Disease Paternal Grandmother History reviewed, no pertinent family history. Social History Tobacco Use  Smoking status: Former Smoker Last attempt to quit: 2018 Years since quittin.2  Smokeless tobacco: Never Used Substance Use Topics  Alcohol use: No  
 
Allergies Allergen Reactions  Codeine Other (comments)  
  hallucinations  Pcn [Penicillins] Hives States \"it's a childhood allergy\" No current facility-administered medications for this encounter. No current outpatient medications on file. Facility-Administered Medications Ordered in Other Encounters Medication Dose Route Frequency  haloperidol lactate (HALDOL) injection 2 mg  2 mg IntraVENous Q6H PRN  
 ondansetron (ZOFRAN) injection 4 mg  4 mg IntraVENous Q4H PRN  prochlorperazine (COMPAZINE) injection 10 mg  10 mg IntraVENous Q4H PRN  
 LORazepam (ATIVAN) injection 1 mg  1 mg IntraVENous Q15MIN PRN  
 ketorolac (TORADOL) injection 30 mg  30 mg IntraVENous Q8H PRN  
 glycopyrrolate (ROBINUL) injection 0.2 mg  0.2 mg IntraVENous Q4H PRN  
 bisacodyl (DULCOLAX) suppository 10 mg  10 mg Rectal DAILY PRN  
 HYDROmorphone (PF) (DILAUDID) injection 1 mg  1 mg IntraVENous Q4H  
 HYDROmorphone (PF) (DILAUDID) injection 1 mg  1 mg IntraVENous Q15MIN PRN  
  LORazepam (ATIVAN) injection 1 mg  1 mg IntraVENous Q4H  
 
 
 
 LAB AND IMAGING FINDINGS:  
 
Lab Results Component Value Date/Time WBC 20.9 (H) 04/29/2019 04:28 AM  
 HGB 9.9 (L) 04/29/2019 04:28 AM  
 PLATELET 073 80/53/7654 04:28 AM  
 
Lab Results Component Value Date/Time Sodium 138 04/29/2019 04:28 AM  
 Potassium 4.1 04/29/2019 04:28 AM  
 Chloride 109 (H) 04/29/2019 04:28 AM  
 CO2 22 04/29/2019 04:28 AM  
 BUN 59 (H) 04/29/2019 04:28 AM  
 Creatinine 2.31 (H) 04/29/2019 04:28 AM  
 Calcium 8.0 (L) 04/29/2019 04:28 AM  
 Magnesium 1.7 04/25/2019 04:33 AM  
 Phosphorus 3.0 04/23/2019 05:40 AM  
  
Lab Results Component Value Date/Time AST (SGOT) 12 (L) 04/23/2019 05:40 AM  
 Alk. phosphatase 56 04/23/2019 05:40 AM  
 Protein, total 4.8 (L) 04/23/2019 05:40 AM  
 Albumin 1.9 (L) 04/23/2019 05:40 AM  
 Globulin 2.9 04/23/2019 05:40 AM  
 
No results found for: INR, PTMR, PTP, PT1, PT2, APTT No results found for: IRON, FE, TIBC, IBCT, PSAT, FERR No results found for: PH, PCO2, PO2 No components found for: [place] No results found for: CPK, CKMB Total time: 35 
Counseling / coordination time, spent as noted above: 30 
> 50% counseling / coordination?: yes Prolonged service was provided for  []30 min   []75 min in face to face time in the presence of the patient, spent as noted above. Time Start:  
Time End:  
Note: this can only be billed with 22792 (initial) or 40438 (follow up). If multiple start / stop times, list each separately.

## 2019-04-29 NOTE — PROGRESS NOTES
Bedside shift change report given to [facility] (oncoming nurse) by Clayton Mar (offgoing nurse). Report included the following information SBAR and Kardex.

## 2019-04-30 NOTE — WOUND CARE
Wound and Ostomy Follow-up: 
 
Patient resting. No distress. Spouse at bedside. Ostomy appliance intact. Green liquid stool noted in pouch. No questions at the moment from patient or Spouse.

## 2019-04-30 NOTE — PROGRESS NOTES
Music Therapy Assessment [address] [id number]    
1970  44 y.o.  male Patient Telephone Number: [phone] (home) Jainism Affiliation: No Rastafari Language: [place] Patient Active Problem List  
 Diagnosis Date Noted  Colorectal cancer ([facility] 04/29/2019  Metastatic adenocarcinoma ([facility] 04/27/2019  Anal stenosis 04/27/2019  Adenocarcinoma of colon ([facility] 04/27/2019  Pleural effusion on right 04/27/2019  Anxiety with depression 04/27/2019  
 History of esophageal cancer 04/27/2019  
 History of lung cancer 04/27/2019  HERMINIA (acute kidney injury) ([facility] 04/27/2019  CKD (chronic kidney disease) stage 4, GFR 15-29 ml/min ([facility]) 04/27/2019  Severe protein-calorie malnutrition ([facility] 04/27/2019  Esophageal dysphagia 04/27/2019  Weakness generalized 04/27/2019  Dyspnea 04/12/2019  High grade dysplasia of Rodriguez's epithelium 04/09/2014  Tobacco abuse 01/30/2014 Date: 4/30/2019            Total Time (in minutes): 35          SFM  MED SURG 2 Mental Status:   [x] Alert-but drowsy [  ] Tabitha Claros [  ]  Confused  [  ] Minimally responsive Communication Status: [  ] Impaired Speech [  ] Nonverbal -N/A Physical Status:   [  ] Oxygen in use  [  ] Hard of Hearing [  ] Vision Impaired [  ] Ambulatory  [  ] Ambulatory with assistance [  ] Non-ambulatory -N/A Music Preferences, Background: Pt's spouse [facility] said pt likes [employer] songs, both contemporary and traditional. She said pt has a guitar but she has not seen him play it. Clinical Problem addressed: Alleviate pain, support relaxation and comfort. Goal(s) met in session: 
Physical/Pain management (Scale of 1-10): Pre-session rating: Please see Session Observations below. Post-session rating: Please see Session Observations below. [x] Increased relaxation   [  ] Regulated breathing patterns [x] Decreased muscle tension   [  ] Minimized physical distress Emotional/Psychological: 
[  ] Increased self-expression   [  ] Decreased aggressive behavior [  ] Decreased sadness   [  ] Discussed healthy coping skills [  ] Improved mood    [  ] Decreased withdrawn behavior Social: 
[  ] Decreased feelings of isolation/loneliness [  ] Positive social interaction  
[x] Provided support and/or comfort for family/friends Spiritual: 
[x] Spiritual support    [  ] Expressed peace [  ] Expressed adam    [  ] Discussed beliefs Techniques Utilized (Check all that apply):  
[  ] Procedural support MT [x] Music for relaxation [x] Patient preferred music 
[  ] Sarah analysis  [  ] Song choice  [  ] Music for validation [  ] Entrainment  [  ] Progressive muscle relax. [  ] Guided visualization [  ] Linnea Butt  [  ] Patient instrument playing [  ] Lisa Saenz writing [  ] Zion Latin along   [  ] Improvisation  [x] Sensory stimulation [  ] Active Listening  [x] Music for spiritual support [  ] Making of CDs as gifts Session Observations:  Referral from Usha Hearn Palliative NP. Patient (pt) was lying in bed with his eyes closed and his spouse Kenya Meléndez was at bedside. Pt's spouse shared that pt has been having pain and discomfort this morning and was receptive to music therapy to help with this. She expressed feeling more comfortable exiting the room for several minutes to take care of something knowing the MT team (this music therapist and music therapy intern) would be with pt. MT team sat at bedside and sang and played the [employer] with guitar. Pt increased alertness in response to the music, moving his head and neck with facial muscle tension and moaning. MT team invited the pt to focus his mind on the sound of the music and the lyrics. Pt increased relaxation as the song continued, as evidenced by (AEB) his facial tension and moaning ceasing.  MT team then sang and played the [employer] Give Me Cornelio and the hymn Be Thou My Vision with guitar. During the latter, pt opened his eyes briefly and had facial muscle tension and moaning again. Pt's spouse re-entered the room. MT team encouraged pt to focus on the music, singing and playing the [employer] with guitar. Pt increased relaxation AEB closing his eyes and his muscle tension and moaning ceasing. Pt's spouse expressed gratitude for the session, saying this is why the pt loves this hospital. Will follow as able to support pt/family coping and relaxation. LENO GilmanBC (Music Therapist-Board Certified) 
and 
Peerla nena \"Ailyn\" Stacia Music Therapy Intern Spiritual Care Department Referral-based service

## 2019-04-30 NOTE — PROGRESS NOTES
[employer] Good Help to Those in Need 
[phone] Patient Name: Grabiel Lala YOB: 1970 Date of Provider Hospice Visit: 04/30/19 Level of Care:   [x] General Inpatient (GIP)    [] Routine   [] Respite Current Location of Care: 
[] [facility] [x] [facility] [] [address] [] [address] [] Hospice House THE [facility] IF [facility], patient referred from: 
[] [facility] [] [facility] [] [address] [] [address] [] Home [] Other:  
 
Date of Original Hospice Admission: 4/29/2019 Hospice Medical Director at time of admission: Geovanny Borges Principle Hospice Diagnosis: Metastatic colorectal cancer Diagnoses RELATED to the terminal prognosis: Esophageal cancer, lung cancer, small bowel obstruction, severe protein malnutrition Other Diagnoses: Ivory Estimable Grabiel Lala is a 44y.o. year old who was admitted to [facility]. Mr. Kavitha Zavala is an unfortunate 61-year-old gentleman with prior diagnosis of esophageal and lung cancer. He presented to the hospital on 4/12/19 because of what \"anal \"pain. He was seen by Dr. Jeffery Pelletier from GI. He had both an upper endoscopy and attempt at colonoscopy. He had upper endoscopy confirmed a malignant appearing stricture at 37 to 40 cm. If he had already had a PEG placement because of this prior cancer. 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 Because of this near obstructing mass, he underwent initially laparoscopic laparotomy that had to be converted to open secondary to extensive carcinomatosis. He ultimately required an ileostomy because of an obstructing mass on the right colon area. He was found to have diffuse cancer throughout the surgical site. In addition, he underwent a Pleurx drain placement on the right side of his chest secondary to recurrent malignant pleural effusion.   Unfortunately patient continued to decline postoperatively to the point that he was unable to eat, drink, nor have resolution of postop ileus/bowel obstruction secondary to his cancer. He is becoming more restless and confused and ultimately family elected to transition to hospice care. The patient's principle diagnosis has resulted in bowel obstruction Refer to LCD Functionally, the patient's Karnofsky and/or Palliative Performance Scale has declined over a period of weeks and is estimated at 10-20. The patient is dependent on the following ADLs: All Objective information that support this patients limited prognosis includes: Carcinomatosis found on surgery The patient/family chose comfort measures with the support of Hospice. HOSPICE DIAGNOSES Active Symptoms: 
1. Generalized abdominal pain 2. Restlessness with agitation 3. Persistent nausea and vomiting 4. Extensive carcinomatosis PLAN 1. Patient will continue general inpatient level care. He does show symptoms of restlessness and generalized pain this morning. In addition some mild labored breathing. His wife is not at the bedside currently. 2. Restlessness with agitation we will adjust his Ativan to 1 mg IV every 2 hours scheduled and every 15 minutes as needed. 3. Generalized abdominal pain we increase Dilaudid to 2 mg IV every 4 hours scheduled and every 15 minutes as needed 4. Nausea and vomiting we have ordered as needed Zofran and Compazine he has required 1 dose of PRN Zofran. 5. In addition, comfort order set has been placed to include antiemetics and antipyretics and further medications to include Haldol for agitation 6. Plan reviewed with family by Denny Og, hospice nurse and bedside nurse. 7. Discussed plan with bedside nurse and Denny Og hospice nurse 8.  and SW to support family needs 9. Disposition: Likely will pass in the hospital 
 
Prognosis estimated based on 04/30/19 clinical assessment is:  
[x] Hours to Days   
[] Days to Weeks   
[] Other: Communicated plan of care with: Hospice Case Manager; Hospice IDT; Care Team 
 
 GOALS OF CARE Patient/Medical POA stated Goal of Care: Hospice care 
 
[x] I have reviewed and/or updated ACP information in the Advance Care Planning Navigator. This information is available in the [address] link in the patient's chart header. Primary Decision [facility] Agent):   Primary Decision Maker: Willie Fitzpatrick Spouse - [phone] Secondary Decision Maker: Oscar Zhu - Sister - [phone] Resuscitation Status: DNR If DNR is there a Durable DNR on file? : [] Yes [] No (If no, complete Durable DNR) HISTORY History obtained from: Chart, spouse, sister CHIEF COMPLAINT: Anal pain The patient is:  
[x] Verbal 
[] Nonverbal 
[] Unresponsive HPI/SUBJECTIVE: Patient is verbal but very restless and confused. He was not able to participate in our discussion today. His sister and spouse were at the bedside and talked with Griselda Newberry. 
 
4/30 patient is minimally responsive. He does show some mild labored breathing. REVIEW OF SYSTEMS The following systems were: [x] reviewed  [] unable to be reviewed Positive ROS include: 
Constitutional: fatigue, weakness, in pain, short of breath Gastrointestinal:poor appetite, nausea, vomiting, abdominal pain, Musculoskeletal:pain, Neurologic:confusion, hallucinations, weakness Psychiatric:anxiety, Adult Non-Verbal Pain Assessment Score: 4 oh/10 Face 
[] 0   No particular expression or smile 
[x] 1   Occasional grimace, tearing, frowning, wrinkled forehead 
[] 2   Frequent grimace, tearing, frowning, wrinkled forehead Activity (movement) [] 0   Lying quietly, normal position 
[x] 1   Seeking attention through movement or slow, cautious movement 
[] 2   Restless, excessive activity and/or withdrawal reflexes Guarding 
[] 0   Lying quietly, no positioning of hands over areas of body [x] 1   Splinting areas of the body, tense 
[] 2   Rigid, stiff Physiology (vital signs) [x] 0   Stable vital signs [] 1   Change in any of the following: SBP > 20mm Hg; HR > 20/minute 
[] 2   Change in any of the following: SBP > 30mm Hg; HR > 25/minute Respiratory 
[] 0   Baseline RR/SpO2, compliant with ventilator 
[x] 1   RR > 10 above baseline, or 5% drop SpO2, mild asynchrony with ventilator 
[] 2   RR > 20 above baseline, or 10% drop SpO2, asynchrony with ventilator FUNCTIONAL ASSESSMENT Palliative Performance Scale (PPS):20 
 
 
 PSYCHOSOCIAL/SPIRITUAL ASSESSMENT Active Problems: 
  Colorectal cancer ([facility] (2019) Past Medical History:  
Diagnosis Date  Cancer [facility])   
 esophagus cancer, right lung cancer  Chronic obstructive pulmonary disease ([facility]  [facility]  Hypertension  Sleep apnea   
 no cpap Past Surgical History:  
Procedure Laterality Date  COLONOSCOPY N/A 2019 COLONOSCOPY performed by Geronimo Chery MD at [facility] HX ENDOSCOPY    
 w/ PEG tube  HX HEENT    
 14years old had eye surgery  IR INSERT CATH PLEURAL INDWELL  2019 Social History Tobacco Use  Smoking status: Former Smoker Last attempt to quit: 2018 Years since quittin.2  Smokeless tobacco: Never Used Substance Use Topics  Alcohol use: No  
 
Family History Problem Relation Age of Onset  Heart Disease Mother  Hypertension Mother  Heart Disease Brother  Heart Disease Maternal Grandmother  Heart Disease Paternal Grandmother Allergies Allergen Reactions  Codeine Other (comments)  
  hallucinations  Pcn [Penicillins] Hives States \"it's a childhood allergy\" Current Facility-Administered Medications Medication Dose Route Frequency  LORazepam (ATIVAN) injection 1 mg  1 mg IntraVENous Q2H  
 HYDROmorphone (PF) (DILAUDID) injection 2 mg  2 mg IntraVENous Q4H  
  haloperidol lactate (HALDOL) injection 2 mg  2 mg IntraVENous Q6H PRN  
 ondansetron (ZOFRAN) injection 4 mg  4 mg IntraVENous Q4H PRN  prochlorperazine (COMPAZINE) injection 10 mg  10 mg IntraVENous Q4H PRN  
 LORazepam (ATIVAN) injection 1 mg  1 mg IntraVENous Q15MIN PRN  
 ketorolac (TORADOL) injection 30 mg  30 mg IntraVENous Q8H PRN  
 glycopyrrolate (ROBINUL) injection 0.2 mg  0.2 mg IntraVENous Q4H PRN  
 bisacodyl (DULCOLAX) suppository 10 mg  10 mg Rectal DAILY PRN  
 HYDROmorphone (PF) (DILAUDID) injection 1 mg  1 mg IntraVENous Q15MIN PRN  
 
 
 PHYSICAL EXAM  
 
Wt Readings from Last 3 Encounters:  
04/12/19 144 lb (65.3 kg) 06/09/14 192 lb (87.1 kg) 04/09/14 193 lb 6.4 oz (87.7 kg) Visit Vitals BP (!) 84/61 (BP 1 Location: Left arm, BP Patient Position: At rest) Pulse 84 Temp 97.7 °F (36.5 °C) Resp 24 SpO2 91% Supplemental O2  [] Yes  [x] NO Last bowel movement:  
 
Currently this patient has: 
[] Peripheral IV [] PICC  [x] PORT [] ICD [x] Batres Catheter [] NG Tube   [] PEG Tube   
[] Rectal Tube [] Drain 
[] Other:  
 
Constitutional: Emaciated, ill-appearing, minimally responsive. Eyes: Sunken ENMT: Dry 
Cardiovascular: Tachycardia Respiratory: Mild respiratory distress, Pleurx drain on the right, decreased on the right Gastrointestinal: Distended, minimal to no bowel sounds, ileostomy with small amount of drainage, PEG in place Musculoskeletal: Muscle wasting Skin: Thin Neurologic: Restless and agitated Pertinent Lab and or Imaging Tests: 
Lab Results Component Value Date/Time  Sodium 138 04/29/2019 04:28 AM  
 Potassium 4.1 04/29/2019 04:28 AM  
 Chloride 109 (H) 04/29/2019 04:28 AM  
 CO2 22 04/29/2019 04:28 AM  
 Anion gap 7 04/29/2019 04:28 AM  
 Glucose 150 (H) 04/29/2019 04:28 AM  
 BUN 59 (H) 04/29/2019 04:28 AM  
 Creatinine 2.31 (H) 04/29/2019 04:28 AM  
 BUN/Creatinine ratio 26 (H) 04/29/2019 04:28 AM  
 GFR est AA 36 (L) 04/29/2019 04:28 AM  
 GFR est non-AA 30 (L) 04/29/2019 04:28 AM  
 Calcium 8.0 (L) 04/29/2019 04:28 AM  
 
Lab Results Component Value Date/Time  Protein, total 4.8 (L) 04/23/2019 05:40 AM  
 Albumin 1.9 (L) 04/23/2019 05:40 AM  
 
   
 
Total time: 35 
Counseling / coordination time:  
> 50% counseling / coordination?:

## 2019-04-30 NOTE — PROGRESS NOTES
Bedside shift change report given to Ledy Mcghee (oncoming nurse) by Alondra Contreras (offgoing nurse). Report included the following information SBAR, Kardex, MAR, Recent Results and Quality Measures.

## 2019-04-30 NOTE — PROGRESS NOTES
Bedside shift change report given to [facility] (oncoming nurse) by Chuy Arias (offgoing nurse). Report included the following information SBAR, Kardex and MAR.

## 2019-04-30 NOTE — HOSPICE
[employer] Good Help to Those in Need 
[phone] GIP Daily Nursing Note Patient Name: Joann Simmons YOB: 1970 Age: 44 y.o. Date of Visit: 04/30/19 Facility of Care: [facility] Patient Room: 535/01 Hospice Attending: Jody Curtis MD 
Hospice Diagnosis: Colorectal cancer [facility]) Lola Barksdale Level of Care: GIP Current GIP Symptoms 1. Agitation/Restlessness 2. Pain 3. SOB 
 
 
 
ASSESSMENT & PLAN Must update Plan of Care including visit frequencies for IDT members 1. Continue GIP LOC 2. Increase scheduled dilaudid to 2mg Q4. 
3.  Increase scheduled ativan to 2mg Q2. 
4.  Frequent nursing assessment and medication management of IV meds 5. Ostomy and drain management. 6.  Emotional support for wife Montserrat Mobley and sister Tequila Veloz. Spiritual Interventions:  visit Psych/ Social/ Emotional Interventions: MSW visit Care Coordination Needs: none identified Care plan and New Orders discussed / approved with Dr. Jack Cruz MD. Description History and Chart Review If this is initial GIP note must document RN assessment/MD communication in previous setting. Specifically document nursing/medication needs in last 24 hours to support GIP care Narrative History of last 24 hours that demonstrates care cannot be provided in another setting: 
Patient was restless and agitated overnight, trying to get out of bed. He appears short of breath this morning. What has been done to control the patient's symptoms in the last 24 hours? Scheduled ativan and dilaudid Does the patient currently require IV medications? Yes Does the patient currently require scheduled medications? Yes Does the patient currently require a PCA? No 
 
List number of doses of PRN medications in last 24 hours: 
Medication 1: robinul Number of doses: 1 Medication 2: zofran Number of doses: 1 Medication 3:  
Number of doses: Supporting documentation for GIP need for pain control: 
[x] Frequent evaluation by a doctor, nurse practitioner, nurse  
[x] Frequent medication adjustment   
[x] IVs that cannot be administered at home  
[x] Aggressive pain management  
[] Complicated technical delivery of medications Supporting documentation for GIP need for symptom control: 
[x]  Sudden decline necessitating intensive nursing intervention 
[]  Uncontrolled / intractable nausea or vomiting  
[]  Pathological fractures 
[]  Advanced open wounds requiring frequent skilled care 
[] Unmanageable respiratory distress 
[] New or worsening delirium [x] Delirium with behavior issues: Is 24 hour caregiver present due to safety concerns with agitation? (yes/no) [] Imminent death  with skilled nursing needs documented above DISCHARGE PLANNING Daily discharge planning required for GIP 1. Discharge Plan: patient expected to  in hospital 
2. Patient/Family teaching: n/a 3. Response to patient/family teaching: n/a ASSESSMENT   
KARNOFSKY: 30 
 
Prognosis estimated based on 19 clinical assessment is:  
[] Few to Many Hours [] Hours to Days [x] Few to Many Days  
[] Days to Weeks  
[] Few to Many Weeks  
[] Weeks to Months  
[] Few to Many Months Quality Measure: Patient self-reports:  [] Yes    [x] No 
ESAS:  
Time of Assessment: 6090 Pain (1-10):0 Fatigue (1-10): 5 Shortness of breath (1-10):6 Nausea (1-10): 0 Appetite (1-10): Anxiety: (1-10): Depression: (1-10): Well-being: (1-10): Constipation: _ Yes  _ No 
LAST BM: 19 CLINICAL INFORMATION Patient Vitals for the past 12 hrs: 
 Temp Pulse Resp BP SpO2  
19 0844   24    
19 0822 97.7 °F (36.5 °C) 84 17 (!) 84/61 91 % 19 0507 97.5 °F (36.4 °C) (!) 110 17 101/65 95 % Currently this patient has: 
[] Supplemental O2  
[] IV   
[] PICC [x] PORT  
[] NG Tube   
[] PEG Tube  
[x] Ostomy [] Batres draining _______ urine 
[] Other:  
 
SIGNS/PHYSICAL FINDINGS Skin (including wound): 
[] Warm, dry, supple, intact and color normal for race [x] Warm  
[x] Dry  
[] Cool    
[] Clammy      
[] Diaphoretic Turgor 
 [] Normal 
 [x] Decreased Color: 
 [] Pink 
 [] Pale 
 [] Cyanotic 
 [] Erythema 
 [] Jaundice [] Normal for Race 
[]  Wounds: 
 
Neuro: 
[] Lethargy 
[] Restlessness / agitation 
[] Confusion / delirium 
[] Hallucinations [x] Responds to maximal stimulation 
[] Unresponsive 
[] Seizures Cardiac:[] Dyspnea on Exertion 
[] JVD [] Murmur 
[] Palpitations [x] Hypotension 
[] Hypertension 
[] Tachycardia [] Bradycardia 
[] Irregular HR 
[] Pulses Decreased 
[] Pulses Absent 
[] Edema:       (Location, Grade and Pitting) [] Mottling:      (Location) Respiratory:Breath sounds:  
 [x] Diminished 
 [] Wheeze 
 [] Rhonchi 
 [] Rales  
[] Even and unlabored 
[x] Labored:   Slightly labored        
[] Cough 
 [] Non Productive 
 [] Productive 
  [] Description:          
[] Deep suctioned  
[] O2 at ___ LPM 
[] High flow oxygen greater than 10 LPM 
[] Bi-Pap GI [x] Abdomen drain present, ostomy present 
[] Ascites 
[] Nausea 
[] Vomiting 
[] Incontinent of bowels [x] Bowel sounds (hypoactive) [] Diarrhea 
[] Constipation (see above including last bowel movement) [] Checked for impaction 
[x] Last BM ostomy Nutrition Diet:__clears________ Appetite:  
[] Good  
[] Fair  
[x] Poor  
[] Tube Feeding  [x] Voiding 
[] Incontinent  
[] Batres Musculoskeletal 
[] Balance/[place] Unsteady [x] Weak Strength:  
 [] Normal  
 [x] Limited  
 [] Decreasing Activities:  
 [] Up as tolerated 
 [x] Bedridden  
 [] Specify: 
 
SAFETY [] 24 hr. Caregiver [x] Side rails ? [x] Hospital bed  
[] Reviewed Falls & Safety ALLERGIES AND MEDICATIONS Allergies: Allergies Allergen Reactions  Codeine Other (comments)  
  hallucinations  Pcn [Penicillins] Hives States \"it's a childhood allergy\" Current Facility-Administered Medications Medication Dose Route Frequency  LORazepam (ATIVAN) injection 1 mg  1 mg IntraVENous Q2H  
 HYDROmorphone (PF) (DILAUDID) injection 2 mg  2 mg IntraVENous Q4H  
 haloperidol lactate (HALDOL) injection 2 mg  2 mg IntraVENous Q6H PRN  
 ondansetron (ZOFRAN) injection 4 mg  4 mg IntraVENous Q4H PRN  prochlorperazine (COMPAZINE) injection 10 mg  10 mg IntraVENous Q4H PRN  
 LORazepam (ATIVAN) injection 1 mg  1 mg IntraVENous Q15MIN PRN  
 ketorolac (TORADOL) injection 30 mg  30 mg IntraVENous Q8H PRN  
 glycopyrrolate (ROBINUL) injection 0.2 mg  0.2 mg IntraVENous Q4H PRN  
 bisacodyl (DULCOLAX) suppository 10 mg  10 mg Rectal DAILY PRN  
 HYDROmorphone (PF) (DILAUDID) injection 1 mg  1 mg IntraVENous Q15MIN PRN Visit Time In: 2762 Visit Time Out: 1200

## 2019-04-30 NOTE — PROGRESS NOTES
Spiritual Care Assessment/Progress Note [address] 
 
 
NAME: Joann Simmons      MRN: [id number] AGE: 44 y.o. SEX: male Cheondoism Affiliation: No Nondenominational Language: [place] 4/30/2019     Total Time (in minutes): 35  
 
Spiritual Assessment begun in [facility]  MED SURG 2 through conversation with: 
  
    []Patient        [x] Family    [] Friend(s) Reason for Consult: Initial/Spiritual assessment, patient floor Spiritual beliefs: (Please include comment if needed) [x] Identifies with a adam tradition:  Janet   
   [] Supported by a adam community:        
   [] Claims no spiritual orientation:       
   [] Seeking spiritual identity:            
   [] Adheres to an individual form of spirituality:       
   [] Not able to assess:                   
 
    
Identified resources for coping:  
   [] Prayer                           
   [x] Music                  [] Guided Imagery [x] Family/friends                 [] Pet visits [] Devotional reading                         [] Unknown 
   [] Other:                                          
 
 
Interventions offered during this visit: (See comments for more details) Patient Interventions: Other (comment)(Music Therapy) Family/Friend(s): Affirmation of emotions/emotional suffering, Affirmation of adam, Catharsis/review of pertinent events in supportive environment, Coping skills reviewed/reinforced, End of life issues discussed, Iconic (affirming the presence of God/Higher Power), Normalization of emotional/spiritual concerns(Wife, sister) Plan of Care: 
 
 [] Support spiritual and/or cultural needs  
 [] Support AMD and/or advance care planning process    
 [] Support grieving process 
 [] Coordinate Rites and/or Rituals  
 [] Coordination with community clergy [] No spiritual needs identified at this time 
 [] Detailed Plan of Care below (See Comments)  [] Make referral to Music Therapy [] Make referral to Pet Therapy    
[] Make referral to Addiction services 
[] Make referral to [facility] 
[] Make referral to Spiritual Care Partner 
[] No future visits requested       
[x] Follow up visits as needed Comments:  visit for initial spiritual assessment, patient discharged from unit and admitted to Hospice. Patient resting in bed, appears comfortable and without pain. Wife, Ce Hayes, and sister, TOMMIE VAZ, at bedside. Provided spiritual presence and listening as they spoke of their present thoughts, feelings, and concerns. Spoke about Boris Bonds and his struggles with cancer. Say that he is at peace telling them earlier that he is tired and is at peace with God and has left his life and future in God's hands. Both say that they, too, are at peace knowing Boris Bonds is comfortable and not in pain and also trusting in God for his life and theirs. Both say they are overwhelmed at times thinking about his passing, but are coping and caring for one another along with other family. They expressed gratitude for all of the care the staff have and are providing. Both appeared comforted as a result of this visit and expressed gratitude for this visit. Visited by Rev. Luh Stark, [facility]  paging service: [phone])

## 2019-05-01 NOTE — PROGRESS NOTES
[facility] Provider Death Note Called to examine patient who has . No response to verbal and tactile stimuli. No respiratory effort. Absent heart sounds and pulses. Pupils fixed and dilated. Patient pronounced dead at 200.

## 2019-05-01 NOTE — PROGRESS NOTES
Responded to notification of Mr. Dejesus's death. His wife and other close family were present and tearful as Hospice RN and I entered the room. Provided gentle spiritual presence as they grieved. Present as Palliative Care NP Charli miller. More family came in. Provided assurance of continued prayers. Chaplains will continue to follow as needed. Visited by: Lu Cash., MS., [address] ([id number])

## 2019-05-01 NOTE — PROGRESS NOTES
Bedside shift change report given to Ghassan Watts (oncoming nurse) by Maty Riley (offgoing nurse). Report included the following information SBAR, Kardex, Procedure Summary, MAR, Recent Results and Quality Measures.

## 2019-05-01 NOTE — PROGRESS NOTES
visit for routine follow up, family care. Patient passed and family at bedside. Provided spiritual presence, listening, and support as the family expressed their grief and spoke of their thoughts, feelings, and emotions. Listened as they told stories of their loved one and reflected on their life and love together. Offered words of hope and comfort. Family appeared comforted as a result of this visit and expressed gratitude for this visit. Visited by Rev. Ana Gonzalez, [address]  paging service: [phone])

## 2019-05-01 NOTE — HOSPICE
[facility] Good Help to Those in Need 
[phone] Discharge/Death Nursing Note Patient Name: Harlie Schlatter YOB: 1970 Age: 44 y.o. Date of Death: 19 Admitted Date: 2019 Time of Death: 1115 Facility of Care: [facility] Level of Care: [facility] Patient Room: [facility]/ Hospice Attending: Nelida Quinn MD 
Hospice Diagnosis: Colorectal cancer [facility]) Elke Zapien Death Pronouncement Pronouncement of death completed by: Mae Salcido NP Agency staff WAS present at the time of death At the time of death the patient was documented as No response to verbal and tactile stimuli. No respiratory effort. Absent heart sounds and pulses. Pupils fixed and dilated The pt  within [facility] The following were notified of the patient's death: family, physician, hospice, , Myra Katz, Dr. Yolanda Mccauley Medications were disposed of per facility protocol Discharge Summary Discharge Reason: Death Summary of Care Provided: 
 
[] Post mortem care provided by hospital staff 
[] Notification of  home by nursing supervisor 
[] Referrals/Community resources provided:  
[] Goals completed 
[] Durable Medical Equipment vendor notified Disciplines involved: [x] RN [x] SW [x]  [] JONES [] Vol [] PT [] OT [] ST [] BC 
 
[x] IDT communication/notification Attending Physician, Dr. Yolanda Mccauley, notified of death Bereaved Verify bereaved identified with name, address, telephone number and risk level Emergency Contact(s) Name Relation Home Work [address] [place] Sister   [phone] [facility] Spouse [phone] Spouse is Nancy Hermosillo, sister is Rubi, medium risk due to multiple losses in recent past 
 
Advance Care Planning 2019 Patient's Healthcare Decision Maker is: Verbal statement (Legal Next of Kin remains as decision maker) Confirm Advance Directive Yes, not on file

## 2019-05-03 NOTE — DISCHARGE SUMMARY
Hospice Discharge Summary [address] [place] [address] Good Help to Those in Need Date of Admission: 4/29/2019 Date of Discharge: 5/1/2019 Kishore Gatica is a 44y.o. year old who was admitted to [address] [place] [address] at [facility] with a Hospice diagnosis of Col[facility] cancer ([facility] Danica Curiel. The patient's care was focused on comfort and the patient passed away on 5/1/2019.

## 2020-08-31 NOTE — PROGRESS NOTES
Today's Updates/Plan 
  
1. CM spoke with Pts wife. Pt's wife inquired if Pt would be inpatient hospice appropriate. CM reached out to Palliative care physician. CM was informed that someone would be in to evaluate Pt today.  
  
 2. CM will continue to follow-up with discharge plan. 
  
  
Discharge plan 
  
1. home with comfort care vs. Inpatient hospice  
  
TONY Maurice, CM [address] [place] [address] 
  
 
 
 Virtual Telephone Check-In    Rob Parsons verbally consents to a Virtual/Telephone Check-In visit on 08/31/20. Patient has been referred to the [facility] website at [email or web address] to review the yearly Consent to Treat document.     Patient delmy Advised to take all precautions, including frequent handwashing and wearing a mask. If sx worsen or become severe, advised to go to ED. Pt understands and agrees with plan.       2) Acute maxillary sinusitis:  - since pt is known to have recurrent sinus inf

## 2025-04-29 NOTE — CONSULTS
He appears to be stable on low dose Lexapro, continue current dose of Lexapro.   Orders:    CBC and Auto Differential; Future    Comprehensive Metabolic Panel; Future     Urology Consult Patient: Jose Manuel Diaz MRN: [id number]  SSN: [id number] YOB: 1970  Age: 44 y.o. Sex: male Date of Consultation:  April 24, 2019 Requesting Physician: Stephanie Carson MD 
Reason for Consultation:  hematuria History of Present Illness:  Jose Manuel Diaz is a 44 y.o. male admitted 4/12/2019 to the hospital for Dyspnea [R06.00]. He has multiple co morbidties including esophageal , lung and questionable anal cancer . Pt has noted hematuria for several days during hospitalization  with noted UTI . He  denies hematuria at home or at any other time . He is not on any anticoagulants or had any instrumentation in the past . Pt did have a reinoso at one time not sure when the hematuria started . Pt has been seen by palliative there is some discussion of home hospice . Pt states he is not interested in pursuing a hematuria work up . Not sure if it would be beneficial in light of other issues . Nurse reports pt voiding , no clots noted in urinal . There is no urine for me to assess at this time . He is currently on Levaquin for UTI Past Medical History:  
Diagnosis Date  Cancer [facility])   
 esophagus cancer, right lung cancer  Chronic obstructive pulmonary disease ([facility]  Diabetes ([facility]  Hypertension  Sleep apnea   
 no cpap Past Surgical History:  
Procedure Laterality Date  COLONOSCOPY N/A 4/12/2019 COLONOSCOPY performed by James Moulton MD at [address] [facility] HX ENDOSCOPY    
 w/ PEG tube  HX HEENT    
 14years old had eye surgery  IR INSERT CATH PLEURAL INDWELL  4/16/2019 Allergies Allergen Reactions  Codeine Other (comments)  
  hallucinations  Pcn [Penicillins] Hives States \"it's a childhood allergy\" Prior to Admission medications Medication Sig Start Date End Date Taking? Authorizing Provider  
sertraline (ZOLOFT) 50 mg tablet Take 50 mg by mouth daily.    Yes Provider, Historical  
 
 
Social History Tobacco Use  Smoking status: Former Smoker Last attempt to quit: 2018 Years since quittin.1  Smokeless tobacco: Never Used Substance Use Topics  Alcohol use: No  
  
 
Family History Problem Relation Age of Onset  Heart Disease Mother  Hypertension Mother  Heart Disease Brother  Heart Disease Maternal Grandmother  Heart Disease Paternal Grandmother ROS:  Admission ROS by Navin Decker MD from 2019 was reviewed and changes (other than per HPI) include: none. Physical Exam: 
          Vitals[de-identified]    Temp (24hrs), Av.8 °F (36.6 °C), Min:97.3 °F (36.3 °C), Max:98.2 °F (36.8 °C) Blood pressure (!) 86/52, pulse (!) 101, temperature 97.6 °F (36.4 °C), resp. rate 16, height 5' 9\" (1.753 m), weight 65.3 kg (144 lb), SpO2 95 %. I&O's:    No intake/output data recorded. General:    alert and oriented, appears nontoxic, no acute distress Skin:   Warm and dry HEENT:  NCAT, anicteric sclerae, moist mucus membranes Chest[de-identified]  normal respiratory effort CV[de-identified]  Regular rhythm, no LE edema Abdomen/Flank[de-identified]  no CVAT, soft, non-tender abdomen without masses, organomegaly or hernia Peg tube in place and RLQ ileostomy Bladder[de-identified]  bladder not palpable Lymph nodes:  no cervical or supraclavicular LAD Musculoskeletal:  no deformities Genitalia:  Nl external examination Neuro/Psychiatric:   normal affect Lab Review: CBC Recent Labs  
  19 
0540 19 WBC 10.9 8.0 HGB 10.9* 10.2* HCT 34.3* 32.7*  
 274 CMP Recent Labs  
  19 
0540 19  146*  
K 4.2 4.3 * 115* CO2 23 23 * 145* BUN 48* 48* CREA 2.01* 1.91* CA 8.7 8.7 MG 1.8 1.8 PHOS 3.0  --   
ALB 1.9*  --   
TBILI 0.3  --   
SGOT 12*  --   
ALT 8*  --   
 
 
 Other No results for input(s): INR, PSA in the last 72 hours. No lab exists for component: PTT, INREXT 
 
UA:  
Lab Results Component Value Date/Time Color RED 04/19/2019 09:34 AM  
 Appearance BLOODY (A) 04/19/2019 09:34 AM  
 Specific gravity 1.017 04/19/2019 09:34 AM  
 pH (UA) 5.5 04/19/2019 09:34 AM  
 Protein 300 (A) 04/19/2019 09:34 AM  
 Glucose NEGATIVE  04/19/2019 09:34 AM  
 Ketone 15 (A) 04/19/2019 09:34 AM  
 Urobilinogen 1.0 04/19/2019 09:34 AM  
 Nitrites POSITIVE (A) 04/19/2019 09:34 AM  
 Leukocyte Esterase MODERATE (A) 04/19/2019 09:34 AM  
 Epithelial cells FEW 04/19/2019 09:34 AM  
 Bacteria 3+ (A) 04/19/2019 09:34 AM  
 WBC 10-20 04/19/2019 09:34 AM  
 RBC >100 (H) 04/19/2019 09:34 AM  
 
 
Cultures:  NA Imaging:  
 Study Result INDICATION: Impaired renal function 
  
TECHNIQUE: Routine ultrasound images of the kidneys and retroperitoneum were 
obtained. 
  
FINDINGS: The right kidney measures 10.6 cm in length. The left kidney measures 11.4 cm in length. Both kidneys demonstrate mildly increased cortical 
echogenicity. No renal calculus is seen. There is no hydronephrosis. There are 
multiple bilateral renal cysts, measuring up to 4.9 cm. The abdominal aorta and 
common iliac artery origins are obscured by overlying bowel gas. The urinary 
bladder demonstrates no filling defect. The inferior vena cava is patent. Right 
pleural effusion and small ascites are incidentally noted. 
  
IMPRESSION IMPRESSION: Mildly increased bilateral renal cortical echogenicity suggests 
medical renal disease. No hydronephrosis. Multiple bilateral renal cysts. Right 
pleural effusion and small ascites. 
   
Imaging [address] (Order: [id number]) - 4/12/2019 Assessment:  
 
Active Problems: Dyspnea (4/12/2019) Plan: 1. Hematuria - Pt decline further evaluation . Discussion had with pt if Hgb should drop, he would then consent to cystoscopy at that time . Please contact [facility] for  questions  . Will sign off for now Signed By: Yousif Cabrera NP  - April 24, 2019

## (undated) DEVICE — DRAPE,REIN 53X77,STERILE: Brand: MEDLINE

## (undated) DEVICE — 3M™ MEDIPORE™ H SOFT CLOTH TAPE SHORT ROLL TAPE 6INCHES X 2 YARDS 16 ROLLS/CASE 2866S: Brand: 3M™ MEDIPORE™

## (undated) DEVICE — KENDALL RADIOLUCENT FOAM MONITORING ELECTRODE -RECTANGULAR SHAPE: Brand: KENDALL

## (undated) DEVICE — TROCAR ENDOSCP L100MM DIA5MM BLDELSS STBL SL OBT RADLUC

## (undated) DEVICE — (D)PREP SKN CHLRAPRP APPL 26ML -- CONVERT TO ITEM 371833

## (undated) DEVICE — COLOSTOMY/ILEOSTOMY KIT, FLEXWEAR: Brand: NEW IMAGE

## (undated) DEVICE — BAG BELONG PT PERS CLEAR HANDL

## (undated) DEVICE — KIT COLON W/ 1.1OZ LUB AND 2 END

## (undated) DEVICE — INFECTION CONTROL KIT SYS

## (undated) DEVICE — TUBING INSUFLTN 10FT LUER -- CONVERT TO ITEM 368568

## (undated) DEVICE — DEVICE TRNSF SPIK STL 2008S] MICROTEK MEDICAL INC]

## (undated) DEVICE — GAUZE SPNG AVANT 4X4 4PLY NS --

## (undated) DEVICE — SOLIDIFIER MEDC 1200ML -- CONVERT TO 356117

## (undated) DEVICE — CUFF RMFG BP INF SZ 11 DISP -- LAWSON OEM ITEM 238915

## (undated) DEVICE — SUTURE PDS II SZ 1 L96IN ABSRB VLT TP-1 L65MM 1/2 CIR Z880G

## (undated) DEVICE — TOWEL SURG W17XL27IN STD BLU COT NONFENESTRATED PREWASHED

## (undated) DEVICE — Device

## (undated) DEVICE — SPONGE LAP 18X18IN STRL -- 5/PK

## (undated) DEVICE — 3000CC GUARDIAN II: Brand: GUARDIAN

## (undated) DEVICE — LARGE, DISPOSABLE ALEXIS O C-SECTION PROTECTOR - RETRACTOR: Brand: ALEXIS ® O C-SECTION PROTECTOR - RETRACTOR

## (undated) DEVICE — TROCAR ENDOSCP L100MM DIA5MM BLDELSS STBL SL THRD OPT VW

## (undated) DEVICE — SUTURE MCRYL SZ 4-0 L27IN ABSRB UD L19MM PS-2 1/2 CIR PRIM Y426H

## (undated) DEVICE — BITEBLOCK ENDOSCP 60FR MAXI WHT POLYETH STURDY W/ VELC WVN

## (undated) DEVICE — POOLE SUCTION INSTRUMENT WITH REMOVABLE SHEATH: Brand: POOLE

## (undated) DEVICE — TIP SUCT BLU PLAS BLB W/O CTRL VENT YANK

## (undated) DEVICE — TRAY CATH OD16FR SIL URIN M STATLOK STBL DEV SURSTP

## (undated) DEVICE — PREP SKN CHLRAPRP SNGL 1.75ML --

## (undated) DEVICE — BAG SPEC BIOHZRD 10 X 10 IN --

## (undated) DEVICE — FORCEPS BX L240CM JAW DIA2.8MM L CAP W/ NDL MIC MESH TOOTH

## (undated) DEVICE — SURGICAL PROCEDURE KIT GEN LAPAROSCOPY LF

## (undated) DEVICE — CANNULA CUSH AD W/ 14FT TBG

## (undated) DEVICE — TRAY PARACENT 8FR 4.75IN --

## (undated) DEVICE — ADULT SPO2 SENSOR: Brand: NELLCOR

## (undated) DEVICE — DRAPE FLD WRM W44XL66IN C6L FOR INTRATEMP SYS THERMABASIN

## (undated) DEVICE — REM POLYHESIVE ADULT PATIENT RETURN ELECTRODE: Brand: VALLEYLAB

## (undated) DEVICE — E-Z CLEAN, PTFE COATED, ELECTROSURGICAL LAPAROSCOPIC ELECTRODE, L-HOOK, 33 CM., SINGLE-USE, FOR USE WITH HAND CONTROL PENCIL: Brand: MEGADYNE

## (undated) DEVICE — LIGHT HANDLE: Brand: DEVON

## (undated) DEVICE — INTENDED FOR TISSUE SEPARATION, AND OTHER PROCEDURES THAT REQUIRE A SHARP SURGICAL BLADE TO PUNCTURE OR CUT.: Brand: BARD-PARKER ® CARBON RIB-BACK BLADES

## (undated) DEVICE — SUTURE VCRL SZ 3-0 L18IN ABSRB UD L26MM SH 1/2 CIR J864D

## (undated) DEVICE — CONTAINER SPEC 20 ML LID NEUT BUFF FORMALIN 10 % POLYPR STS

## (undated) DEVICE — NEEDLE HYPO 22GA L1.5IN BLK S STL HUB POLYPR SHLD REG BVL

## (undated) DEVICE — DEVON™ KNEE AND BODY STRAP 60" X 3" (1.5 M X 7.6 CM): Brand: DEVON

## (undated) DEVICE — 1200 GUARD II KIT W/5MM TUBE W/O VAC TUBE: Brand: GUARDIAN

## (undated) DEVICE — STERILE POLYISOPRENE POWDER-FREE SURGICAL GLOVES: Brand: PROTEXIS

## (undated) DEVICE — CATH IV AUTOGRD BC PNK 20GA 25 -- INSYTE

## (undated) DEVICE — SEALER ENSEAL CUR 3MMX35CM --

## (undated) DEVICE — STERILE LATEX POWDER-FREE SURGICAL GLOVESWITH NITRILE COATING: Brand: PROTEXIS

## (undated) DEVICE — 3M™ CUROS™ DISINFECTING CAP FOR NEEDLELESS CONNECTORS 270/CARTON 20 CARTONS/CASE CFF1-270: Brand: CUROS™

## (undated) DEVICE — CATH URETH INTMIT ROB 14FR FUN -- USE ITEM 179521

## (undated) DEVICE — APPLICATOR BNDG 1MM ADH PREMIERPRO EXOFIN